# Patient Record
Sex: FEMALE | Race: BLACK OR AFRICAN AMERICAN | NOT HISPANIC OR LATINO | Employment: OTHER | URBAN - METROPOLITAN AREA
[De-identification: names, ages, dates, MRNs, and addresses within clinical notes are randomized per-mention and may not be internally consistent; named-entity substitution may affect disease eponyms.]

---

## 2020-01-13 ENCOUNTER — TRANSFERRED RECORDS (OUTPATIENT)
Dept: HEALTH INFORMATION MANAGEMENT | Facility: CLINIC | Age: 30
End: 2020-01-13

## 2020-01-13 LAB — PAP-ABSTRACT: NORMAL

## 2021-03-05 NOTE — PROGRESS NOTES
Assessment & Plan     Irregular menses  Patient with irregular menses who is trying to get pregnant. Has a history of intrauterine fetal demise at 37 weeks, induced with Cytotec and cook catheter, vaginal delivery last 7/2020. Has a history of being hyperthyroid.   - HCG Qual, Urine ([id number])  - OB/GYN REFERRAL  - TSH with free T4 reflex  - CBC with platelets differential                 Return in about 6 months (around 9/8/2021), or if symptoms worsen or fail to improve.    YOJANA Driscoll CNP  [facility] MANNIE Boo is a 30 year old who presents for the following health issues  accompanied by her spouse:    HPI       Chief Complaint   Patient presents with     Abnormal Bleeding Problem     on/off period for 3 months. Trying to get pregnant.     Menstrual Concern  Onset/Duration: 3 months  Description:   Duration of bleeding episodes: 2 days, last was Feburary 28th and March 1st (a week ago)  Frequency of periods: (1st day of one to 1st day of next):  Very irregular, went 46 days without menses. She thought she might be pregnant because of the long duration between cycles. She is trying to get pregnant. States that she had negative home pregnancy test a week ago. States that her bleeding last week was dark red and malodorous.   Describe bleeding/flow:   Clots:  yes  Number of pads/day: 2        Cramping: none  Accompanying Signs & Symptoms:  Lightheadedness: yes  Temperature intolerance: no  Nosebleeds/Easy bruising: no  Vaginal Discharge: no  History:  Patient's last menstrual period was 02/28/2021 (exact date).  Previous normal periods: YES  Contraceptive use: NO  Sexually active: YES  Any bleeding after intercourse: no  Abnormal PAP Smears: no  Has a history of intrauterine fetal demise at 37 weeks, induced with Cytotec and cook catheter, vaginal delivery last 7/2020. Has a history of being hyperthyroid.   Precipitating or alleviating factors: None  Therapies tried  and outcome: None      Review of Systems   Constitutional, HEENT, cardiovascular, pulmonary, gi and gu systems are negative, except as otherwise noted.      Objective    /58   Pulse 90   Temp 97.7  F (36.5  C) (Oral)   Wt 57.2 kg (126 lb)   SpO2 100%   There is no height or weight on file to calculate BMI.  Physical Exam   GENERAL: healthy, alert and no distress  EYES: Eyes grossly normal to inspection, PERRL and conjunctivae and sclerae normal  RESP: lungs clear to auscultation - no rales, rhonchi or wheezes  CV: regular rate and rhythm, normal S1 S2, no S3 or S4, no murmur, click or rub, no peripheral edema and peripheral pulses strong  ABDOMEN: soft, nontender, no hepatosplenomegaly, no masses and bowel sounds normal  PSYCH: mentation appears normal, affect normal/bright    Results for orders placed or performed in visit on 03/08/21 (from the past 24 hour(s))   HCG Qual, Urine ([id number])   Result Value Ref Range    HCG Qual Urine Negative NEG^Negative

## 2021-03-08 ENCOUNTER — OFFICE VISIT (OUTPATIENT)
Dept: FAMILY MEDICINE | Facility: CLINIC | Age: 31
End: 2021-03-08
Payer: COMMERCIAL

## 2021-03-08 ENCOUNTER — TELEPHONE (OUTPATIENT)
Dept: FAMILY MEDICINE | Facility: CLINIC | Age: 31
End: 2021-03-08

## 2021-03-08 VITALS
WEIGHT: 126 LBS | TEMPERATURE: 97.7 F | HEART RATE: 90 BPM | SYSTOLIC BLOOD PRESSURE: 104 MMHG | DIASTOLIC BLOOD PRESSURE: 58 MMHG | OXYGEN SATURATION: 100 %

## 2021-03-08 DIAGNOSIS — N92.6 IRREGULAR MENSES: Primary | ICD-10-CM

## 2021-03-08 DIAGNOSIS — E05.90 HYPERTHYROIDISM: Primary | ICD-10-CM

## 2021-03-08 LAB
BASOPHILS # BLD AUTO: 0 10E9/L (ref 0–0.2)
BASOPHILS NFR BLD AUTO: 0.2 %
DIFFERENTIAL METHOD BLD: NORMAL
EOSINOPHIL # BLD AUTO: 0.1 10E9/L (ref 0–0.7)
EOSINOPHIL NFR BLD AUTO: 2.2 %
ERYTHROCYTE [DISTWIDTH] IN BLOOD BY AUTOMATED COUNT: 12.6 % (ref 10–15)
HCG UR QL: NEGATIVE
HCT VFR BLD AUTO: 41.1 % (ref 35–47)
HGB BLD-MCNC: 13.1 G/DL (ref 11.7–15.7)
LYMPHOCYTES # BLD AUTO: 1.9 10E9/L (ref 0.8–5.3)
LYMPHOCYTES NFR BLD AUTO: 41.7 %
MCH RBC QN AUTO: 28.5 PG (ref 26.5–33)
MCHC RBC AUTO-ENTMCNC: 31.9 G/DL (ref 31.5–36.5)
MCV RBC AUTO: 90 FL (ref 78–100)
MONOCYTES # BLD AUTO: 0.5 10E9/L (ref 0–1.3)
MONOCYTES NFR BLD AUTO: 10 %
NEUTROPHILS # BLD AUTO: 2.1 10E9/L (ref 1.6–8.3)
NEUTROPHILS NFR BLD AUTO: 45.9 %
PLATELET # BLD AUTO: 206 10E9/L (ref 150–450)
RBC # BLD AUTO: 4.59 10E12/L (ref 3.8–5.2)
T4 FREE SERPL-MCNC: 1.25 NG/DL (ref 0.76–1.46)
TSH SERPL DL<=0.005 MIU/L-ACNC: <0.01 MU/L (ref 0.4–4)
WBC # BLD AUTO: 4.5 10E9/L (ref 4–11)

## 2021-03-08 PROCEDURE — 84439 ASSAY OF FREE THYROXINE: CPT | Performed by: NURSE PRACTITIONER

## 2021-03-08 PROCEDURE — 81025 URINE PREGNANCY TEST: CPT | Performed by: NURSE PRACTITIONER

## 2021-03-08 PROCEDURE — 99203 OFFICE O/P NEW LOW 30 MIN: CPT | Performed by: NURSE PRACTITIONER

## 2021-03-08 PROCEDURE — 36415 COLL VENOUS BLD VENIPUNCTURE: CPT | Performed by: NURSE PRACTITIONER

## 2021-03-08 PROCEDURE — 84443 ASSAY THYROID STIM HORMONE: CPT | Performed by: NURSE PRACTITIONER

## 2021-03-08 PROCEDURE — 85025 COMPLETE CBC W/AUTO DIFF WBC: CPT | Performed by: NURSE PRACTITIONER

## 2021-03-08 NOTE — LETTER
March 8, 2021      Ema Rojas  [address] [id number]        Dear ,    We are writing to inform you of your test results.    See TE 3/8/2021       Resulted Orders   HCG Qual, Urine ([id number])   Result Value Ref Range    HCG Qual Urine Negative NEG^Negative      Comment:      This test is for screening purposes.  Results should be interpreted along with   the clinical picture.  Confirmation testing is available if warranted by   ordering [id number], HCG Quantitative Pregnancy.     TSH with free T4 reflex   Result Value Ref Range    TSH <0.01 (L) 0.40 - 4.00 mU/L   CBC with platelets differential   Result Value Ref Range    WBC 4.5 4.0 - 11.0 10e9/L    RBC Count 4.59 3.8 - 5.2 10e12/L    Hemoglobin 13.1 11.7 - 15.7 g/dL    Hematocrit 41.1 35.0 - 47.0 %    MCV 90 78 - 100 fl    MCH 28.5 26.5 - 33.0 pg    MCHC 31.9 31.5 - 36.5 g/dL    RDW 12.6 10.0 - 15.0 %    Platelet Count 206 150 - 450 10e9/L    Diff Method Automated Method     % Neutrophils 45.9 %    % Lymphocytes 41.7 %    % Monocytes 10.0 %    % Eosinophils 2.2 %    % Basophils 0.2 %    Absolute Neutrophil 2.1 1.6 - 8.3 10e9/L    Absolute Lymphocytes 1.9 0.8 - 5.3 10e9/L    Absolute Monocytes 0.5 0.0 - 1.3 10e9/L    Absolute Eosinophils 0.1 0.0 - 0.7 10e9/L    Absolute Basophils 0.0 0.0 - 0.2 10e9/L   T4 free   Result Value Ref Range    T4 Free 1.25 0.76 - 1.46 ng/dL       If you have any questions or concerns, please call the clinic at the number listed above.       Sincerely,      YOJANA Cabrera CNP/bueno

## 2021-03-08 NOTE — TELEPHONE ENCOUNTER
Patient has history of 37 week IUFD last year and is currently trying to get pregnant. Referral had been done to GYN to discuss irregular periods. Work up today for irregular menses shows patient is hyperthyroid. Thyroid US ordered and referral to endocrinology done. Called patient and discussed results and recommendation. She verbalizes understanding and agreement with plan.    Margy Singleton, APRN, CNP

## 2021-03-08 NOTE — PATIENT INSTRUCTIONS
Patient Education     Dysfunctional Uterine Bleeding    Dysfunctional uterine bleeding is also called abnormal uterine bleeding. It's a condition in which bleeding is abnormal and occurs at unexpected times of the month. This happens because of changes in the hormones that help control a woman s menstrual cycle each month.   The bleeding may be heavier or lighter than normal. If you have heavy bleeding often, this can lead to a problem called anemia. With anemia, your red blood cell count is too low. Red blood cells help carry oxygen throughout your body. Severe anemia may cause you to look pale and feel very weak or tired. You might also become short of breath easily.   To treat dysfunctional uterine bleeding, you may take medicines. If these don t help, or if you have other symptoms or have reached menopause, you may need more testing and other treatments. Discuss all of your options with your provider.   Home care  Medicines  If you re prescribed medicines, take them as directed. Some of the more common medicines you may be prescribed include:     Hormone therapy. These include most methods of hormonal birth control such as pills, shots, or a hormone-releasing IUD.    Nonsteroidal anti-inflammatory drugs (NSAIDs), such as ibuprofen    Tranexamic acid. This helps your blood clot.    Iron supplements, if you have anemia     General care    Get plenty of rest if you tire easily. Don't do strenuous exercise.    To help ease pain or cramping that may occur with bleeding, try using a heating pad on the lower belly or back. A warm bath may also help.    Follow-up care  Follow up with your healthcare provider, or as directed.  When to seek medical advice  Call your healthcare provider right away if:    Bleeding becomes heavy (soaking 1 pad or tampon every hour for 3 hours)    Increased abdominal pain    Irregular bleeding gets worse or does not get better even with treatment    Fever of 100.4 F (38 C) or higher, or as  directed by your provider    Signs of anemia, such as pale skin, extreme fatigue or weakness, or shortness of breath    Dizziness or fainting   Teresa last reviewed this educational content on 7/1/2020 2000-2020 [employer]. All rights reserved. This information is not intended as a substitute for professional medical care. Always follow your healthcare professional's instructions.

## 2021-03-11 ENCOUNTER — ANCILLARY PROCEDURE (OUTPATIENT)
Dept: ULTRASOUND IMAGING | Facility: CLINIC | Age: 31
End: 2021-03-11
Attending: NURSE PRACTITIONER
Payer: COMMERCIAL

## 2021-03-11 DIAGNOSIS — E05.90 HYPERTHYROIDISM: ICD-10-CM

## 2021-03-11 NOTE — LETTER
March 12, 2021      Ema Rojas  [address] [facility] [address]        Dear ,    We are writing to inform you of your test results.    Your results are normal.       Resulted Orders   US Thyroid    Narrative    ULTRASOUND THYROID  3/11/2021 9:41 AM    CLINICAL HISTORY: Hyperthyroidism.    TECHNIQUE: Thyroid ultrasound.     COMPARISON: None available.    FINDINGS:  RIGHT lobe: Measures 4.7 x 1.3 x 1.9 cm. Homogeneous echotexture.  Isthmus: Measures 3 mm in thickness.  LEFT lobe: Measures 4.4 x 1.7 x 1.5 cm. Homogeneous echotexture.    NECK: No cervical lymphadenopathy.    NODULES: No discrete thyroid nodules.      Impression    IMPRESSION:  Normal thyroid ultrasound with no discrete thyroid  nodules.    Nodules are characterized per  ACR Thyroid Imaging, Reporting and Data System (TI-RADS): White Paper  of the ACR TI-RADS Committee  Willie Davidson et al. Journal of the American College of  Radiology 2017. Volume 14 (2017), Issue 5, 587-201.     MONICA LOPES MD       If you have any questions or concerns, please call the clinic at the number listed above.       Sincerely,      Margy Singleton, YOJANA CNP/bueno

## 2021-03-16 ENCOUNTER — TELEPHONE (OUTPATIENT)
Dept: FAMILY MEDICINE | Facility: CLINIC | Age: 31
End: 2021-03-16

## 2021-03-16 NOTE — TELEPHONE ENCOUNTER
Reason for Call:  Request for results:    Name of test or procedure: Labs    Date of test of procedure: 03-    Location of the test or procedure: [facility]    OK to leave the result message on voice mail or with a family member? YES    Phone number Patient can be reached at:  Cell number on file:    Telephone Information:   Mobile [phone]       Additional comments: na    Call taken on 3/16/2021 at 12:52 PM by Tiara Alvarez

## 2021-03-17 NOTE — TELEPHONE ENCOUNTER
Patient requesting results of Thyroid US. Spoke with patient and notified of results as written. She inquired about whether or not she still has hyperthyroidism because her ultrasound came back normal. She was advised that her lab test still did confirm that she has hyperthyroidism despite there being no abnormalities seen on her ultrasound.     She also inquired about what she should do about her hyperthyroidism. Writer asked her if she had scheduled with the endocrinologist yet-- she stated that she was not aware of the Endocrinology Referral that was placed 03/08/2021. Writer provided patient with telephone number to get scheduled with endocrinologist. She verbalized good understanding.     KP Lau RN  [facility] [place]

## 2021-03-19 ENCOUNTER — OFFICE VISIT (OUTPATIENT)
Dept: ENDOCRINOLOGY | Facility: CLINIC | Age: 31
End: 2021-03-19
Payer: COMMERCIAL

## 2021-03-19 VITALS
RESPIRATION RATE: 12 BRPM | HEART RATE: 75 BPM | WEIGHT: 126 LBS | SYSTOLIC BLOOD PRESSURE: 110 MMHG | DIASTOLIC BLOOD PRESSURE: 69 MMHG

## 2021-03-19 DIAGNOSIS — R94.6 ABNORMAL FINDING ON THYROID FUNCTION TEST: Primary | ICD-10-CM

## 2021-03-19 PROCEDURE — 99204 OFFICE O/P NEW MOD 45 MIN: CPT | Performed by: INTERNAL MEDICINE

## 2021-03-19 NOTE — NURSING NOTE
Chief Complaint   Patient presents with     Consult     Thyroid Problem       Initial /69 (BP Location: Right arm, Patient Position: Sitting, Cuff Size: Adult Regular)   Pulse 75   Resp 12   Wt 57.2 kg (126 lb)   LMP 02/28/2021 (Exact Date)  There is no height or weight on file to calculate BMI.  BP completed using cuff size: regular  Medications and allergies reviewed.      Belgica SHEPPARD MA

## 2021-03-19 NOTE — PROGRESS NOTES
CC: Abnormal thyroid function tests     HPI:   Patient presents for evaluation of abnormal thyroid function tests.     This was checked as part of a work up for irregular menses.   Irregular menses began in 12/2020.   No prior menstrual issues.     She was told that she had an abnormal thyroid labs in 7/2020 when she delivered a stillborn baby.   Remarks she was told that labs were normal after.     No palpitations or SOB.   She has gained 8 pounds since 7/2020. No change in appetite.     No recent illness.   No supplement use.     She has also developed heat intolerance and trouble falling asleep.     ROS: 10 point ROS neg other than the symptoms noted above in the HPI.    PMH:   There is no problem list on file for this patient.    Meds:  No current outpatient medications on file.     No current facility-administered medications for this visit.       FHX:   No thyroid disease.     SHX:  Works in medical assembly  Non-smoker.    Exam:   Vital signs:      BP: 110/69 Pulse: 75   Resp: 12         Weight: 57.2 kg (126 lb)  There is no height or weight on file to calculate BMI.  Gen: In NAD.   HEENT: no proptosis or lid lag, EOMI, thyroid enlarged w/o clear nodule.   Card: S1 S2, tachycardia, no m/r/g. no LE edema.   Pulm: CTA b/l.   GI: NT ND +BS.   MSK: no gross deformities.   Derm: no rashes or lesions.   Neuro: no tremor, +2 DTR's.     A/P:   Abnormal thyroid function tests - DDx includes thyroiditis, Graves', and toxic nodule. Possible treatments of I 131, methimazole/PTU, and surgery discussed. She is less than one year from delivering a baby which was sadly stillborn. Low TSH, 0.01 in 7/2020 but other thyroid labs including TRAB were normal. As she is looking to get pregnant again in the near future, I would use PTU. Explained she would need to wait 6 months post I 131 until trying to conceive again.   -Schedule thyroid uptake and scan.   -We will plan on using PTU if needed. For more information go to  [email or web address]  Call [place] radiology scheduling for your procedure:    For scheduling at [facility] ([facility], [facility]), call [phone] or [phone]            Ammon Rosado M.D

## 2021-03-19 NOTE — LETTER
3/19/2021         RE: Ema Rojas  [address] [address]        Dear Colleague,    [facility] you [facility] referring your patient, [facility]ma Rojas[facility] Please see a copy of my visit note below.    CC: Abnormal thyroid function tests     HPI:   Patient presents for evaluation of abnormal thyroid function tests.     This was checked as part of a work up for irregular menses.   Irregular menses began in 12/2020.   No prior menstrual issues.     She was told that she had an abnormal thyroid labs in 7/2020 when she delivered a stillborn baby.   Remarks she was told that labs were normal after.     No palpitations or SOB.   She has gained 8 pounds since 7/2020. No change in appetite.     No recent illness.   No supplement use.     She has also developed heat intolerance and trouble falling asleep.     ROS: 10 point ROS neg other than the symptoms noted above in the HPI.    PMH:   There is no problem list on file for this patient.    Meds:  No current outpatient medications on file.     No current facility-administered medications for this visit.       FHX:   No thyroid disease.     SHX:  Works in medical assembly  Non-smoker.    Exam:   Vital signs:      BP: 110/69 Pulse: 75   Resp: 12         Weight: 57.2 kg (126 lb)  There is no height or weight on file to calculate BMI.  Gen: In NAD.   HEENT: no proptosis or lid lag, EOMI, thyroid enlarged w/o clear nodule.   Card: S1 S2, tachycardia, no m/r/g. no LE edema.   Pulm: CTA b/l.   GI: NT ND +BS.   MSK: no gross deformities.   Derm: no rashes or lesions.   Neuro: no tremor, +2 DTR's.     A/P:   Abnormal thyroid function tests - DDx includes thyroiditis, Graves', and toxic nodule. Possible treatments of I 131, methimazole/PTU, and surgery discussed. She is less than one year from delivering a baby which was sadly stillborn. Low TSH, 0.01 in 7/2020 but other thyroid labs including TRAB were normal. As she is looking to get  pregnant again in the near future, I would use PTU. Explained she would need to wait 6 months post I 131 until trying to conceive again.   -Schedule thyroid uptake and scan.   -We will plan on using PTU if needed. For more information go to [email or web address]  Call [place] radiology scheduling for your procedure:    For scheduling at [facility] ([facility], [facility]), call [phone] or [phone]            Ammon Rosado M.D          Again, thank you for allowing me to participate in the care of your patient.        Sincerely,        Ammon Rosado MD

## 2021-03-19 NOTE — PATIENT INSTRUCTIONS
-Schedule thyroid uptake and scan.   -We will plan on using PTU if needed. For more information go to [email or web address]    Call [place] radiology scheduling for your procedure:    For scheduling at [facility] ([facility], [facility] and [facility]), call [phone] or [phone]      For scheduling in the [place] ([place], [facility], and [place]) call [phone] or [phone]

## 2021-04-08 ENCOUNTER — PRENATAL OFFICE VISIT (OUTPATIENT)
Dept: OBGYN | Facility: CLINIC | Age: 31
End: 2021-04-08
Payer: COMMERCIAL

## 2021-04-08 VITALS — BODY MASS INDEX: 26.63 KG/M2 | WEIGHT: 123.46 LBS | HEIGHT: 57 IN

## 2021-04-08 DIAGNOSIS — Z34.80 PRENATAL CARE, SUBSEQUENT PREGNANCY, UNSPECIFIED TRIMESTER: Primary | ICD-10-CM

## 2021-04-08 PROCEDURE — 99207 PR NO CHARGE NURSE ONLY: CPT

## 2021-04-08 RX ORDER — PNV NO.95/FERROUS FUM/FOLIC AC 28MG-0.8MG
TABLET ORAL
COMMUNITY
End: 2021-07-09

## 2021-04-08 SDOH — HEALTH STABILITY: MENTAL HEALTH: HOW OFTEN DO YOU HAVE A DRINK CONTAINING ALCOHOL?: NEVER

## 2021-04-08 ASSESSMENT — MIFFLIN-ST. JEOR: SCORE: 1163.88

## 2021-05-01 ENCOUNTER — NURSE TRIAGE (OUTPATIENT)
Dept: NURSING | Facility: CLINIC | Age: 31
End: 2021-05-01

## 2021-05-01 ENCOUNTER — HEALTH MAINTENANCE LETTER (OUTPATIENT)
Age: 31
End: 2021-05-01

## 2021-05-01 NOTE — TELEPHONE ENCOUNTER
Spouse and pt on the line, pt 8w6d pregnant, reports vomiting 5 times in the past 24 hours.  Pt able to keep liquids down but vomits when she eats solids.  She is urinating.  Pt taking her prenatal at [facility].      OB appt 05/12/21    Disposition:  Home care with education.  Advised pushing fluids and starting Vitamin B 6 per protocol.  Call back with any new/worsening symptom.    He verbalized understanding and had no further questions.     COVID 19 Nurse Triage Plan/Patient Instructions    Please be aware that novel coronavirus (COVID-19) may be circulating in the community. If you develop symptoms such as fever, cough, or SOB or if you have concerns about the presence of another infection including coronavirus (COVID-19), please contact your health care provider or visit [email or web address].     Disposition/Instructions    Home care recommended. Follow home care protocol based instructions.    Thank you for taking steps to prevent the spread of this virus.  o Limit your contact with others.  o Wear a simple mask to cover your cough.  o Wash your hands well and often.    Resources    [facility]: About COVID-19: [email or web address]    CDC: What to Do If You're Sick: www.cdc.gov/coronavirus/2019-ncov/about/steps-when-sick.html    CDC: Ending Home Isolation: www.cdc.gov/coronavirus/2019-ncov/hcp/disposition-in-home-patients.html     CDC: Caring for Someone: www.cdc.gov/coronavirus/2019-ncov/if-you-are-sick/care-for-someone.html     [facility]: Interim Guidance for Hospital Discharge to Home: www.health.[facility].[place].us/diseases/coronavirus/hcp/hospdischarge.pdf    [facility] clinical trials (COVID-19 research studies): clinicalaffairs.[email or web address].edu/um-clinical-trials     Below are the COVID-19 hotlines at the [place] Department [facility] ([facility]). Interpreters are available.   o For health questions: Call 493-056-9795 or 1-946.839.8167 (7 a.m. to 7 p.m.)  o For questions about schools and  "childcare: Call 648-813-3140 or 1-242.587.9320 (7 a.m. to 7 p.m.)                       Additional Information    Negative: Sounds like a life-threatening emergency to the triager    Negative: [1] Vomiting AND [2] contains red blood or black (\"coffee ground\") material  (Exception: few red streaks in vomit that only happened once)    Negative: [1] Insulin-dependent diabetes (Type I) AND [2] glucose > 400 mg/dl (22 mmol/l)    Negative: Recent head injury (within last 3 days)    Negative: Recent abdominal injury (within last 3 days)    Negative: Severe pain in one eye    Negative: [1] SEVERE vomiting (e.g., 8 or more times / day) AND [2] present > 8 hours    Negative: [1] Drinking very little AND [2] dehydration suspected (e.g., no urine > 12 hours, very dry mouth, very lightheaded)    Negative: Patient sounds very sick or weak to the triager    Negative: [1] Unable to keep ANY liquids down (without vomiting) AND [2] present > 24 hours    Negative: Weight loss > 5 pounds (2.5 kg) in last 2 weeks    Negative: Vomiting an essential/prescribed medication  (Exception: prenatal vitamins)    Negative: Recently started on a new medication    Negative: [1] MODERATE vomiting (e.g., 2-7 times / day) AND [2] present > 3 days    Negative: Pain or burning with passing urine (urination)    Negative: Alcohol or drug abuse, known or suspected    Negative: High-risk adult (e.g., diabetes, AIDS/HIV)    Negative: [1] MILD vomiting (e.g., 1-2 times / day) AND [2] present > 1 week AND [3] no improvement after using Morning Sickness Care Advice    MILD-MODERATE vomiting  (e.g., 1-7 times / day)or nausea    Protocols used: PREGNANCY - MORNING SICKNESS (NAUSEA AND VOMITING OF PREGNANCY)-A-AH      "

## 2021-05-12 ENCOUNTER — PRENATAL OFFICE VISIT (OUTPATIENT)
Dept: OBGYN | Facility: CLINIC | Age: 31
End: 2021-05-12
Payer: MEDICAID

## 2021-05-12 ENCOUNTER — MEDICAL CORRESPONDENCE (OUTPATIENT)
Dept: HEALTH INFORMATION MANAGEMENT | Facility: CLINIC | Age: 31
End: 2021-05-12

## 2021-05-12 VITALS
DIASTOLIC BLOOD PRESSURE: 86 MMHG | SYSTOLIC BLOOD PRESSURE: 135 MMHG | OXYGEN SATURATION: 98 % | WEIGHT: 118.4 LBS | HEART RATE: 112 BPM | BODY MASS INDEX: 25.62 KG/M2

## 2021-05-12 DIAGNOSIS — Z34.80 PRENATAL CARE, SUBSEQUENT PREGNANCY, UNSPECIFIED TRIMESTER: ICD-10-CM

## 2021-05-12 DIAGNOSIS — O09.291 PRIOR PREGNANCY WITH FETAL DEMISE, ANTEPARTUM, FIRST TRIMESTER: Primary | ICD-10-CM

## 2021-05-12 DIAGNOSIS — O21.9 NAUSEA AND VOMITING DURING PREGNANCY: ICD-10-CM

## 2021-05-12 LAB
ABO + RH BLD: NORMAL
ABO + RH BLD: NORMAL
BASOPHILS # BLD AUTO: 0 10E9/L (ref 0–0.2)
BASOPHILS NFR BLD AUTO: 0.2 %
BLD GP AB SCN SERPL QL: NORMAL
BLOOD BANK CMNT PATIENT-IMP: NORMAL
DIFFERENTIAL METHOD BLD: NORMAL
EOSINOPHIL # BLD AUTO: 0 10E9/L (ref 0–0.7)
EOSINOPHIL NFR BLD AUTO: 0.5 %
ERYTHROCYTE [DISTWIDTH] IN BLOOD BY AUTOMATED COUNT: 12.7 % (ref 10–15)
HCT VFR BLD AUTO: 39.1 % (ref 35–47)
HGB BLD-MCNC: 13.3 G/DL (ref 11.7–15.7)
LYMPHOCYTES # BLD AUTO: 1.4 10E9/L (ref 0.8–5.3)
LYMPHOCYTES NFR BLD AUTO: 24.6 %
MCH RBC QN AUTO: 28.8 PG (ref 26.5–33)
MCHC RBC AUTO-ENTMCNC: 34 G/DL (ref 31.5–36.5)
MCV RBC AUTO: 85 FL (ref 78–100)
MONOCYTES # BLD AUTO: 0.5 10E9/L (ref 0–1.3)
MONOCYTES NFR BLD AUTO: 9.1 %
NEUTROPHILS # BLD AUTO: 3.8 10E9/L (ref 1.6–8.3)
NEUTROPHILS NFR BLD AUTO: 65.6 %
PLATELET # BLD AUTO: 225 10E9/L (ref 150–450)
RBC # BLD AUTO: 4.62 10E12/L (ref 3.8–5.2)
SPECIMEN EXP DATE BLD: NORMAL
T PALLIDUM AB SER QL: NONREACTIVE
WBC # BLD AUTO: 5.7 10E9/L (ref 4–11)

## 2021-05-12 PROCEDURE — 99203 OFFICE O/P NEW LOW 30 MIN: CPT | Performed by: OBSTETRICS & GYNECOLOGY

## 2021-05-12 PROCEDURE — 86900 BLOOD TYPING SEROLOGIC ABO: CPT | Performed by: OBSTETRICS & GYNECOLOGY

## 2021-05-12 PROCEDURE — 87086 URINE CULTURE/COLONY COUNT: CPT | Performed by: OBSTETRICS & GYNECOLOGY

## 2021-05-12 PROCEDURE — 85025 COMPLETE CBC W/AUTO DIFF WBC: CPT | Performed by: OBSTETRICS & GYNECOLOGY

## 2021-05-12 PROCEDURE — 87389 HIV-1 AG W/HIV-1&-2 AB AG IA: CPT | Performed by: OBSTETRICS & GYNECOLOGY

## 2021-05-12 PROCEDURE — 36415 COLL VENOUS BLD VENIPUNCTURE: CPT | Performed by: OBSTETRICS & GYNECOLOGY

## 2021-05-12 PROCEDURE — 86762 RUBELLA ANTIBODY: CPT | Performed by: OBSTETRICS & GYNECOLOGY

## 2021-05-12 PROCEDURE — 86901 BLOOD TYPING SEROLOGIC RH(D): CPT | Performed by: OBSTETRICS & GYNECOLOGY

## 2021-05-12 PROCEDURE — 87340 HEPATITIS B SURFACE AG IA: CPT | Performed by: OBSTETRICS & GYNECOLOGY

## 2021-05-12 PROCEDURE — 99000 SPECIMEN HANDLING OFFICE-LAB: CPT | Performed by: OBSTETRICS & GYNECOLOGY

## 2021-05-12 PROCEDURE — 86780 TREPONEMA PALLIDUM: CPT | Performed by: OBSTETRICS & GYNECOLOGY

## 2021-05-12 PROCEDURE — 86850 RBC ANTIBODY SCREEN: CPT | Performed by: OBSTETRICS & GYNECOLOGY

## 2021-05-12 RX ORDER — ONDANSETRON 8 MG/1
8 TABLET, FILM COATED ORAL EVERY 8 HOURS PRN
Qty: 30 TABLET | Refills: 3 | Status: SHIPPED | OUTPATIENT
Start: 2021-05-12 | End: 2022-01-25

## 2021-05-12 NOTE — LETTER
May 12, 2021      Ema Rojas  [address] [address]        To Whom it may concern    Ema has been seen by me.  She is pregnant with her LMP of 02/28/2021 and her estimated due date is 12/05/2021.       Sincerely,          Adi Ku MD

## 2021-05-12 NOTE — PROGRESS NOTES
INITIAL OB ASSESSMENT............................................Date: 2021                            ---------------------    Name: Ema Rojas.......................................................................Plan Number: [id number]    Age: 30 year old   : 1990  Phone: [phone] (home)   Address: [address] [place] [id number]    Marital Status:    Race/Ethnicity: , Georgian   Occupation:     Partner's Name:  Juana Chester, Partner's Occupation:  [employer]     OB Physician: Adi Ku MD       LMP:  Patient's LMP from OB Dating Form:  Patient's last menstrual period was 2021 (exact date).  Regular menses? yes  Menses every month.  Length of menses: 3 days    Obstetrical History  ===================  OB History    Para Term  AB Living   2 1 1 0 0 0   SAB TAB Ectopic Multiple Live Births   0 0 0 0 0      # Outcome Date GA Lbr Polo/2nd Weight Sex Delivery Anes PTL Lv   2 Current            1 Term 20 38w0d   F    FD       Past Medical History:  Past Medical History:   Diagnosis Date     Hyperthyroidism     she has seen endocrinology 2021     Prior pregnancy with fetal demise 2020       Past Surgical History:  Past Surgical History:   Procedure Laterality Date     NO HISTORY OF SURGERY         Current Outpatient Medications   Medication Sig Dispense Refill     ondansetron (ZOFRAN) 8 MG tablet Take 1 tablet (8 mg) by mouth every 8 hours as needed for nausea 30 tablet 3     Prenatal Vit-Fe Fumarate-FA (PRENATAL VITAMIN) 27-0.8 MG TABS          No Known Allergies    Social History     Socioeconomic History     Marital status:      Spouse name: Not on file     Number of children: Not on file     Years of education: Not on file     Highest education level: Not on file   Occupational History     Not on file   Social Needs     Financial resource strain: Not on file     Food insecurity      Worry: Not on file     Inability: Not on file     Transportation needs     Medical: Not on file     Non-medical: Not on file   Tobacco Use     Smoking status: Never Smoker     Smokeless tobacco: Never Used   Substance and Sexual Activity     Alcohol use: Never     Frequency: Never     Drug use: Never     Sexual activity: Yes     Partners: Male     Birth control/protection: None   Lifestyle     Physical activity     Days per week: Not on file     Minutes per session: Not on file     Stress: Not on file   Relationships     Social connections     Talks on phone: Not on file     Gets together: Not on file     Attends Rastafari service: Not on file     Active member of club or organization: Not on file     Attends meetings of clubs or organizations: Not on file     Relationship status: Not on file     Intimate partner violence     Fear of current or ex partner: Not on file     Emotionally abused: Not on file     Physically abused: Not on file     Forced sexual activity: Not on file   Other Topics Concern     Not on file   Social History Narrative     Not on file       No substance abuse, environmental exposures, mental health risks and No financial concerns,pets. Partner support.       Family History   Problem Relation Age of Onset     No Known Problems Mother      No Known Problems Father      No Known Problems Maternal Grandmother      No Known Problems Maternal Grandfather      No Known Problems Paternal Grandmother      No Known Problems Paternal Grandfather      No Known Problems Brother      No Known Problems Sister      No Known Problems Brother      No Known Problems Sister      No Known Problems Sister      No Known Problems Sister        Past Medical History of Father of Baby:   No significant medical history     No known genetic disease in patient's 1st or 2nd degree relatives  No known genetic diseases in the FOB's 1st or 2nd degree relatives      REVIEW OF SYMPTOMS:   History Since Last Menstrual  Period:    nausea, vomiting, fatigue and breast tenderness       PHYSICAL EXAM:  /86 (BP Location: Right arm, Cuff Size: Adult Regular)   Pulse 112   Wt 53.7 kg (118 lb 6.4 oz)   LMP 2021 (Exact Date)   SpO2 98%   Breastfeeding No   BMI 25.62 kg/m    General:  WNWD female, NAD  Oriented:  X 3  Alert  PSYCH:  mentation appears normal., affect and mood normal  HEENT:  NC/AT, EOMI  NECK:  Neck supple. No adenopathy. Thyroid symmetric, normal size,, Carotids without bruits.  HEART:  RRR  LUNGS:  Clear to auscultation.  Good respiratory effort  BREASTS: Declined   ABDOMEN: Benign, Soft, flat, non-tender, No masses, organomegaly and Bowel sounds normoactive FHTs heard  VULVA: no masses or lesions seen  BUS:  Bartholin's, Urethra, [place]'s normal  VAGINA:  No masses or lesions seen.   CERVIX:  Parous, closed, mobile, no discharge  UTERUS:  Normal shape, position and consistency and Nontender; 10-12 week size  ADNEXA:  No masses palpated, non-tender  EXTREMITIES:No cyanosis, clubbing, warm and well perfused and No edema   GAIT: normal including tandem walk, heel and toe walk.        Assessment:   IUP at 10.3 weeks  Prior pregnancy with fetal demise at term  Hyperthyroidism, with normal T4 (she has seen Endocrine)  Nausea in pregnancy       Plan:  Options for  testing for chromosomal and birth defects were discussed with the patient.  Diagnostic tests include CVS and Amniocentesis.  We discussed that these tests are definitive but invasive and do carry a risk of fetal loss.    Screening tests include nuchal translucency/blood marker testing in the first trimester and quad screening in the second trimester.  We discussed that these are screening tests and not diagnostic tests and that false positives and negatives are a distinct possibility.  The patient will discuss with her  and decide.  M consultation regarding her prior fetal demise at term is recommended.   We discussed physician  coverage, tertiary support, diet, exercise, weight gain, schedule of visits, routine and indicated ultrasounds, and childbirth education.   Zofran prescription is sent to pharmacy for the nausea.    Labs done today  RTC 4 weeks

## 2021-05-13 LAB
BACTERIA SPEC CULT: NO GROWTH
HBV SURFACE AG SERPL QL IA: NONREACTIVE
HIV 1+2 AB+HIV1 P24 AG SERPL QL IA: NONREACTIVE
Lab: NORMAL
RUBV IGG SERPL IA-ACNC: 20 IU/ML
SPECIMEN SOURCE: NORMAL

## 2021-05-17 ENCOUNTER — TELEPHONE (OUTPATIENT)
Dept: OBGYN | Facility: CLINIC | Age: 31
End: 2021-05-17

## 2021-05-17 ENCOUNTER — MEDICAL CORRESPONDENCE (OUTPATIENT)
Dept: HEALTH INFORMATION MANAGEMENT | Facility: CLINIC | Age: 31
End: 2021-05-17

## 2021-05-17 NOTE — TELEPHONE ENCOUNTER
NANCI called wondering if you would like to do genetic testing at 12 weeks and if you are wanting a consult for Fetal demise added.    Please advise and we can call M back with Recommendations as the note from 05/12/2021 doesn't look complete.

## 2021-05-19 NOTE — TELEPHONE ENCOUNTER
She was referred to [facility] for the prior fetal demise and for genetic counseling (and testing if patient desires).   Adi Ku MD

## 2021-05-19 NOTE — TELEPHONE ENCOUNTER
I called and left message with [facility] to return call regarding Dr. Ku's note in [place].  SAKSHI Perez 5/19/2021

## 2021-05-20 ENCOUNTER — TRANSCRIBE ORDERS (OUTPATIENT)
Dept: MATERNAL FETAL MEDICINE | Facility: CLINIC | Age: 31
End: 2021-05-20

## 2021-05-20 ENCOUNTER — TRANSFERRED RECORDS (OUTPATIENT)
Dept: HEALTH INFORMATION MANAGEMENT | Facility: CLINIC | Age: 31
End: 2021-05-20

## 2021-05-20 ENCOUNTER — MEDICAL CORRESPONDENCE (OUTPATIENT)
Dept: HEALTH INFORMATION MANAGEMENT | Facility: CLINIC | Age: 31
End: 2021-05-20

## 2021-05-20 DIAGNOSIS — O26.90 PREGNANCY RELATED CONDITION, ANTEPARTUM: Primary | ICD-10-CM

## 2021-05-20 NOTE — TELEPHONE ENCOUNTER
Called [facility] and informed of Dr. Ku's Recommendations.    Palak Moya, [facility]  May 20, 2021

## 2021-05-21 ENCOUNTER — TELEPHONE (OUTPATIENT)
Dept: MATERNAL FETAL MEDICINE | Facility: CLINIC | Age: 31
End: 2021-05-21

## 2021-05-21 DIAGNOSIS — O09.291 PRIOR PREGNANCY WITH FETAL DEMISE AND CURRENT PREGNANCY IN FIRST TRIMESTER: Primary | ICD-10-CM

## 2021-05-21 NOTE — TELEPHONE ENCOUNTER
MFM received referral from Dr. Ku. Called patient to schedule FTS and MFM consultation. Upon reaching patient to schedule, patient stated that she does not wish to schedule these appointments with MFM. Referring clinic notified.       Tammy Alvarez

## 2021-06-08 ENCOUNTER — PRENATAL OFFICE VISIT (OUTPATIENT)
Dept: OBGYN | Facility: CLINIC | Age: 31
End: 2021-06-08
Payer: MEDICAID

## 2021-06-08 VITALS
OXYGEN SATURATION: 100 % | SYSTOLIC BLOOD PRESSURE: 132 MMHG | BODY MASS INDEX: 25.32 KG/M2 | DIASTOLIC BLOOD PRESSURE: 77 MMHG | WEIGHT: 117 LBS | HEART RATE: 110 BPM

## 2021-06-08 DIAGNOSIS — O09.292 PRIOR PREGNANCY WITH FETAL DEMISE AND CURRENT PREGNANCY IN SECOND TRIMESTER: Primary | ICD-10-CM

## 2021-06-08 PROCEDURE — 99212 OFFICE O/P EST SF 10 MIN: CPT | Performed by: OBSTETRICS & GYNECOLOGY

## 2021-06-08 NOTE — PROGRESS NOTES
14w2d   Eating well.  Nausea has improved.   She has not made appointment with [facility] as she does not currently have insurance but she will make the appointment once she is approved for insurance.    She has seen Endocrine regarding the thyroid.  Her Free T4 is in normal range, with the low TSH level.  She should follow up with endocrine, but she will likely hold on that for the same reason as above.   RTC 4 weeks  Adi Ku MD

## 2021-07-09 ENCOUNTER — TRANSCRIBE ORDERS (OUTPATIENT)
Dept: MATERNAL FETAL MEDICINE | Facility: CLINIC | Age: 31
End: 2021-07-09

## 2021-07-09 ENCOUNTER — PRENATAL OFFICE VISIT (OUTPATIENT)
Dept: OBGYN | Facility: CLINIC | Age: 31
End: 2021-07-09
Payer: COMMERCIAL

## 2021-07-09 VITALS
BODY MASS INDEX: 26.05 KG/M2 | WEIGHT: 120.4 LBS | OXYGEN SATURATION: 100 % | SYSTOLIC BLOOD PRESSURE: 120 MMHG | DIASTOLIC BLOOD PRESSURE: 76 MMHG | HEART RATE: 88 BPM

## 2021-07-09 DIAGNOSIS — O09.292 PRIOR PREGNANCY WITH FETAL DEMISE AND CURRENT PREGNANCY IN SECOND TRIMESTER: Primary | ICD-10-CM

## 2021-07-09 DIAGNOSIS — O26.90 PREGNANCY RELATED CONDITION, ANTEPARTUM: Primary | ICD-10-CM

## 2021-07-09 PROCEDURE — 99207 PR PRENATAL VISIT: CPT | Performed by: OBSTETRICS & GYNECOLOGY

## 2021-07-09 RX ORDER — PNV NO.95/FERROUS FUM/FOLIC AC 28MG-0.8MG
1 TABLET ORAL DAILY
Qty: 90 TABLET | Refills: 2 | Status: SHIPPED | OUTPATIENT
Start: 2021-07-09 | End: 2022-01-25

## 2021-07-09 NOTE — TELEPHONE ENCOUNTER
Requested Prescriptions   Pending Prescriptions Disp Refills     Prenatal Vit-Fe Fumarate-FA (PRENATAL VITAMIN) 27-0.8 MG TABS         There is no refill protocol information for this order        Pt last seen today, currently 18w5d    Last prescribed, nothing on file.    Routing refill request to provider for review/approval because:  Drug not on the [facility] refill protocol     Shantal Crockett RN on 7/9/2021 at 10:29 AM

## 2021-07-09 NOTE — PROGRESS NOTES
18w5d.   Doing well without issues/concerns.    She has a little FM.    She is interested in the level 2 ultrasound with MFM.  She has her insurance coverage so she is now willing to go to see them.   RTC 2 weeks  Adi Ku MD

## 2021-07-09 NOTE — TELEPHONE ENCOUNTER
[facility] Call Center    Phone Message    May a detailed message be left on voicemail: yes     Reason for Call: Medication Refill Request    Has the patient contacted the pharmacy for the refill? Yes   Name of medication being requested: Prenatal Vit-Fe Fumarate-FA (PRENATAL VITAMIN) 27-0.8 MG TABS  Provider who prescribed the medication: [facility]   [facility]acy: [facility] [place]  Date medication is needed: ASAP    Action Taken: Message routed to:  [facility] [id number]    Travel Screening: Not Applicable

## 2021-07-22 ENCOUNTER — PRE VISIT (OUTPATIENT)
Dept: MATERNAL FETAL MEDICINE | Facility: CLINIC | Age: 31
End: 2021-07-22

## 2021-07-22 PROBLEM — D50.8 IRON DEFICIENCY ANEMIA SECONDARY TO INADEQUATE DIETARY IRON INTAKE: Status: ACTIVE | Noted: 2020-06-15

## 2021-07-22 PROBLEM — O36.4XX0 IUFD AT 20 WEEKS OR MORE OF GESTATION: Status: ACTIVE | Noted: 2020-07-17

## 2021-07-22 PROBLEM — O09.899 SUSCEPTIBLE TO VARICELLA (NON-IMMUNE), CURRENTLY PREGNANT: Status: ACTIVE | Noted: 2020-01-13

## 2021-07-22 PROBLEM — Z28.39 SUSCEPTIBLE TO VARICELLA (NON-IMMUNE), CURRENTLY PREGNANT: Status: ACTIVE | Noted: 2020-01-13

## 2021-07-23 ENCOUNTER — OFFICE VISIT (OUTPATIENT)
Dept: MATERNAL FETAL MEDICINE | Facility: CLINIC | Age: 31
End: 2021-07-23
Attending: OBSTETRICS & GYNECOLOGY
Payer: COMMERCIAL

## 2021-07-23 ENCOUNTER — HOSPITAL ENCOUNTER (OUTPATIENT)
Dept: ULTRASOUND IMAGING | Facility: CLINIC | Age: 31
End: 2021-07-23
Attending: OBSTETRICS & GYNECOLOGY
Payer: COMMERCIAL

## 2021-07-23 DIAGNOSIS — O26.90 PREGNANCY RELATED CONDITION, ANTEPARTUM: ICD-10-CM

## 2021-07-23 DIAGNOSIS — O09.292 HISTORY OF STILLBIRTH IN CURRENTLY PREGNANT PATIENT, SECOND TRIMESTER: Primary | ICD-10-CM

## 2021-07-23 PROCEDURE — 76811 OB US DETAILED SNGL FETUS: CPT

## 2021-07-23 PROCEDURE — G0463 HOSPITAL OUTPT CLINIC VISIT: HCPCS

## 2021-07-23 PROCEDURE — 99205 OFFICE O/P NEW HI 60 MIN: CPT | Mod: 25 | Performed by: OBSTETRICS & GYNECOLOGY

## 2021-07-23 PROCEDURE — 76811 OB US DETAILED SNGL FETUS: CPT | Mod: 26 | Performed by: OBSTETRICS & GYNECOLOGY

## 2021-07-23 NOTE — NURSING NOTE
Ema seen in clinic today for L2/MFM Consult at 20w5d gestation for pregnancy complicated by hx fetal demise at 38 wks, hyperthyroidism (see report/notes).  Dr. Moya met with pt and discussed POC (see note). Plan for recommendation to be sent to primary provider. No future visits scheduled at this time. Pt discharged stable and ambulatory.

## 2021-07-23 NOTE — PROGRESS NOTES
Adi Ku M.D.     Thank you for requesting a consultation on your patient, Ms. Ema Rojas, for a history of a 36-week 6 day IUFD diagnosed on 2020.  Labs performed at time of diagnosis included negative parvovirus and syphilis screening. Normal thyroid function and serum glucose was 102 mg/dl with HbA1c was 5.0%. Placental pathology reported presence of an umbilical cord hematoma which was attributed to be the cause of the fetal death. Potential associations include mechanical trauma or rupture of a varix with reported mortality of 40-50%. 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 Her UDs was negative. She is also being treated with levothyroxine for hypothyroidism. Her free T4 was within normal range (1.25) but her TSH is suppressed to < 0.01 in 2021. She is followed by endocrinology but has not seen them recently.    Ms. Rojas is a 31-year-old  who has immigrated from [place]. She was initially seen at [facility] and then transferred prenatal care to [facility]. Her prenatal care had been uncomplicated with regular visits with her providers. She had no reported complications with normal BP and 14-pound weight gain. Depression screening during the pregnancy was negative. Her VELIA based on first trimester ultrasound was 2020. She was evaluated by her provider for decreased FM on 2020 and it was determined by ultrasound that an IUFD had occurred unknown cause.  The EFW prior to birth was 3445 gram (88th percentile), which was at the 88th percentile for gestational age, and polyhydramnios was reported by MVP 8.4 cm. She had a VD after IOL with misoprostol on 2020, 3 days after being diagnosed with IUFD. The delivery was uncomplicated and there were no lacerations or postpartum hemorrhage. Thyroid function at the time of delivery was normal.  She declined infant autopsy and genetic microarray studies. She is currently at 20 weeks and 5 days and is here for recommendations for surveillance and delivery.      Prenatal labs report normal results with blood type A Rh positive. Screening for syphilis, hepatitis B, HIV and rubella are all negative. CBC normal with platelets of 225 k/MM3. She takes PNV and Vitamin D supplements. On her initial prenatal visit at 7 weeks, her BP was documented at 110/60 mmHg. With urine negative glucose, nitrites and ketones, but with trace protein.      IMPRESSION:     History of IUFD at 38weeks associated with large fetus (EFW 88th%) with AC consistent with 40 weeks and 3 days at time of diagnosis and polyhydramnios. This is despite having had a HbA1c within the normal range at time of delivery it is not a reliable indicator of glucose intolerance in the third trimester of pregnancy, especially in the context of the findings reported at ultrasound. The diagnosis of umbilical cord hematoma as a cause for the demise was also raised but the literature does not support the diagnosis as consistently being associated with stillbirth even when present.   We reviewed general causes for stillbirth which include maternal, obstetrical and fetal disease. Maternal conditions most commonly reported to be associated with stillbirth include hypertensive disorders, diabetes in pregnancy, and although there was a rise in BP around the time of delivery, there is no evidence of persistent disease and both have been excluded for this patient. We discussed possible fetal conditions such as structural or chromosomal abnormalities and obstetrical conditions such as placental insufficiency, abruption, vasa previa etc. The only finding that has been clearly associated with stillbirth for Ms. Rojas is concern for glucose intolerance associated with large AC and suspected polyhydramnios consistent with some degree of glucose intolerance.     We reviewed  findings of umbilical cord hematoma and the limited information available does not consistently associate the diagnosis with stillbirth.  Furthermore, it is not clear if the is a pre or postmortem event. This finding does not increase baseline risk for recurrent stillbirth in a future pregnancy.     I have discussed association of FGR with risk of stillbirth in a future pregnancy, and the importance of surveillance for FGR with plan for delivery based on fetal surveillance for FGR, BPP and Doppler assessments if FGR recurs.     Ms. Rojas is obviously very concerned about the risk for recurrent stillbirth. I have spent over 60 minutes reviewing findings. I have also emphasized that current standard of care is delivery by 39 weeks with early delivery dictated by findings on  surveillance. I have emphasized the importance of surveillance to detect conditions that could lead to stillbirth rather than focusing on a set timing of delivery. This is important since important developments could be missed if the focus is on timing of delivery and not on surveillance and detection of conditions that could lead to recurrence at an earlier gestational age.     RECOMMENDATIONS:     We reviewed all the recommendations previously discussed:   1) We discussed prenatal genetic screening and testing.  We would recommend discussing option for cell free DNA testing in maternal serum.  2) Comprehensive ultrasound at 18-20 weeks was already done today and was normal with normal growth.    3) Serial ultrasounds for fetal growth every 4 weeks beginning at the time of the comprehensive ultrasound until delivery. Please assess for EFW > 90th percentile or AC accelerated growth (>90th percentile) and or for presence of polyhydramnios.   4)  surveillance with weekly BPP beginning at 32 weeks' gestation until delivery.  5) Deliver at 39 weeks or as indicated by  surveillance large AC, polyhydramnios, non-reassuring  prenatal testing, hypertensive disease or preeclampsia.   6) Data do not consistently support use of LDA for prevention of recurrent stillbirth unless associated with FGR.  7) Recommend early and usual GDM screening secondary to findings from previous pregnancy loss.  8) Recommend checking freeT4 and TSH every 3 months. The initial TSH was low most likely secondary to concurrent effect of early pregnancy HCG levels. This should be resolved by this time.     Thank you for referring this kind couple for consultation with [facility] today.  If you have any questions regarding her consultation, please feel free to contact us here at the [facility] clinic.     Parker Moya MD   Maternal Fetal Medicine Specialist     The patient had all her questions answered. She voiced understanding of the plan of care and her satisfaction with our care today. Thank you for the opportunity to participate in the care of Ms. Rojas. Please do not hesitate to contact us if you may have any questions or concerns.    I spent 15 minutes prior to the visit preparing to see the patient (reviewing medical records and tests).   I spent 30 minutes face-face-to-face with the patient during the visit with the majority (>50%) spent on counseling and coordination of care with the patient and/or family members.   I spent 15 minutes after the visit with the patient documenting the visit in the electronic health record and/or communicating with other health care professionals and/or care coordination.   Total time spent on today s date of service: 60 minutes.

## 2021-09-01 ENCOUNTER — PRENATAL OFFICE VISIT (OUTPATIENT)
Dept: OBGYN | Facility: CLINIC | Age: 31
End: 2021-09-01
Payer: COMMERCIAL

## 2021-09-01 VITALS
BODY MASS INDEX: 28.56 KG/M2 | DIASTOLIC BLOOD PRESSURE: 73 MMHG | OXYGEN SATURATION: 100 % | SYSTOLIC BLOOD PRESSURE: 117 MMHG | HEART RATE: 116 BPM | WEIGHT: 132 LBS

## 2021-09-01 DIAGNOSIS — O36.4XX0 IUFD AT 20 WEEKS OR MORE OF GESTATION: ICD-10-CM

## 2021-09-01 DIAGNOSIS — O09.292 PRIOR PREGNANCY WITH FETAL DEMISE AND CURRENT PREGNANCY IN SECOND TRIMESTER: Primary | ICD-10-CM

## 2021-09-01 PROCEDURE — 99207 PR PRENATAL VISIT: CPT | Performed by: OBSTETRICS & GYNECOLOGY

## 2021-09-01 NOTE — ASSESSMENT & PLAN NOTE
[facility] RECOMMENDATIONS:   We reviewed all the recommendations previously discussed:   1) We discussed prenatal genetic screening and testing.  We would recommend discussing option for cell free DNA testing in maternal serum.  2) Comprehensive ultrasound at 18-20 weeks was already done today and was normal with normal growth.    3) Serial ultrasounds for fetal growth every 4 weeks beginning at the time of the comprehensive ultrasound until delivery. Please assess for EFW > 90th percentile or AC accelerated growth (>90th percentile) and or for presence of polyhydramnios.   4)  surveillance with weekly BPP beginning at 32 weeks' gestation until delivery.  5) Deliver at 39 weeks or as indicated by  surveillance large AC, polyhydramnios, non-reassuring prenatal testing, hypertensive disease or preeclampsia.   6) Data do not consistently support use of LDA for prevention of recurrent stillbirth unless associated with FGR.  7) Recommend early and usual GDM screening secondary to findings from previous pregnancy loss.  8) Recommend checking freeT4 and TSH every 3 months. The initial TSH was low most likely secondary to concurrent effect of early pregnancy HCG levels.

## 2021-09-01 NOTE — PROGRESS NOTES
26w3d    She has not been seen for almost Doing well without issues/concerns.  Routine anticipatory guidance.    She has not been seen for almost 8 weeks.    Labs ordered  Ultrasound ordered.   She has seen MFM  MFM RECOMMENDATIONS:      We reviewed all the recommendations previously discussed:   1) We discussed prenatal genetic screening and testing.  We would recommend discussing option for cell free DNA testing in maternal serum.  2) Comprehensive ultrasound at 18-20 weeks was already done today and was normal with normal growth.    3) Serial ultrasounds for fetal growth every 4 weeks beginning at the time of the comprehensive ultrasound until delivery. Please assess for EFW > 90th percentile or AC accelerated growth (>90th percentile) and or for presence of polyhydramnios.   4)  surveillance with weekly BPP beginning at 32 weeks' gestation until delivery.  5) Deliver at 39 weeks or as indicated by  surveillance large AC, polyhydramnios, non-reassuring prenatal testing, hypertensive disease or preeclampsia.   6) Data do not consistently support use of LDA for prevention of recurrent stillbirth unless associated with FGR.  7) Recommend early and usual GDM screening secondary to findings from previous pregnancy loss.  8) Recommend checking freeT4 and TSH every 3 months. The initial TSH was low most likely secondary to concurrent effect of early pregnancy HCG levels.

## 2021-09-14 ENCOUNTER — ANCILLARY PROCEDURE (OUTPATIENT)
Dept: ULTRASOUND IMAGING | Facility: CLINIC | Age: 31
End: 2021-09-14
Attending: OBSTETRICS & GYNECOLOGY
Payer: COMMERCIAL

## 2021-09-14 ENCOUNTER — PRENATAL OFFICE VISIT (OUTPATIENT)
Dept: OBGYN | Facility: CLINIC | Age: 31
End: 2021-09-14
Payer: COMMERCIAL

## 2021-09-14 VITALS
BODY MASS INDEX: 29.08 KG/M2 | OXYGEN SATURATION: 100 % | SYSTOLIC BLOOD PRESSURE: 113 MMHG | HEART RATE: 104 BPM | DIASTOLIC BLOOD PRESSURE: 74 MMHG | WEIGHT: 134.4 LBS

## 2021-09-14 DIAGNOSIS — O09.292 PRIOR PREGNANCY WITH FETAL DEMISE AND CURRENT PREGNANCY IN SECOND TRIMESTER: ICD-10-CM

## 2021-09-14 DIAGNOSIS — R73.09 ABNORMAL GLUCOSE TOLERANCE TEST: ICD-10-CM

## 2021-09-14 DIAGNOSIS — O09.292 PRIOR PREGNANCY WITH FETAL DEMISE AND CURRENT PREGNANCY IN SECOND TRIMESTER: Primary | ICD-10-CM

## 2021-09-14 DIAGNOSIS — O09.291 PRIOR PREGNANCY WITH FETAL DEMISE AND CURRENT PREGNANCY IN FIRST TRIMESTER: Primary | ICD-10-CM

## 2021-09-14 PROCEDURE — 99207 PR PRENATAL VISIT: CPT | Performed by: OBSTETRICS & GYNECOLOGY

## 2021-09-14 PROCEDURE — 76816 OB US FOLLOW-UP PER FETUS: CPT | Performed by: RADIOLOGY

## 2021-09-14 NOTE — PROGRESS NOTES
28w2d   Doing well.  No problems.  Ultrasound today shows good growth and borderline polyhydramnios.  Refer back to [facility]  Labs next Monday  Adi Ku MD

## 2021-09-15 ENCOUNTER — HOSPITAL ENCOUNTER (OUTPATIENT)
Dept: ULTRASOUND IMAGING | Facility: CLINIC | Age: 31
End: 2021-09-15
Attending: OBSTETRICS & GYNECOLOGY
Payer: COMMERCIAL

## 2021-09-15 ENCOUNTER — OFFICE VISIT (OUTPATIENT)
Dept: MATERNAL FETAL MEDICINE | Facility: CLINIC | Age: 31
End: 2021-09-15
Attending: OBSTETRICS & GYNECOLOGY
Payer: COMMERCIAL

## 2021-09-15 DIAGNOSIS — O09.291 PRIOR PREGNANCY WITH FETAL DEMISE AND CURRENT PREGNANCY IN FIRST TRIMESTER: ICD-10-CM

## 2021-09-15 DIAGNOSIS — O26.90 PREGNANCY RELATED CONDITION, ANTEPARTUM: Primary | ICD-10-CM

## 2021-09-15 PROCEDURE — 76816 OB US FOLLOW-UP PER FETUS: CPT

## 2021-09-15 PROCEDURE — 76816 OB US FOLLOW-UP PER FETUS: CPT | Mod: 26 | Performed by: OBSTETRICS & GYNECOLOGY

## 2021-09-15 NOTE — PROGRESS NOTES
The patient was seen for an ultrasound in the M[facility]etal Medicine Center at the [facility] today.  For a detailed report of the ultrasound examination, please see the ultrasound report which can be found under the imaging tab.    Antonieta Randall MD  , OB/GYN  Maternal-Fetal Medicine  [phone] (Pager)

## 2021-09-20 ENCOUNTER — LAB (OUTPATIENT)
Dept: LAB | Facility: CLINIC | Age: 31
End: 2021-09-20
Payer: COMMERCIAL

## 2021-09-20 DIAGNOSIS — O09.292 PRIOR PREGNANCY WITH FETAL DEMISE AND CURRENT PREGNANCY IN SECOND TRIMESTER: ICD-10-CM

## 2021-09-20 LAB
GLUCOSE 1H P 50 G GLC PO SERPL-MCNC: 177 MG/DL (ref 70–129)
HGB BLD-MCNC: 10.5 G/DL (ref 11.7–15.7)
T4 FREE SERPL-MCNC: 0.98 NG/DL (ref 0.76–1.46)
TSH SERPL DL<=0.005 MIU/L-ACNC: 0.21 MU/L (ref 0.4–4)

## 2021-09-20 PROCEDURE — 82952 GTT-ADDED SAMPLES: CPT

## 2021-09-20 PROCEDURE — 86780 TREPONEMA PALLIDUM: CPT

## 2021-09-20 PROCEDURE — 36415 COLL VENOUS BLD VENIPUNCTURE: CPT

## 2021-09-20 PROCEDURE — 86803 HEPATITIS C AB TEST: CPT

## 2021-09-20 PROCEDURE — 84443 ASSAY THYROID STIM HORMONE: CPT

## 2021-09-20 PROCEDURE — 85018 HEMOGLOBIN: CPT

## 2021-09-20 PROCEDURE — 84439 ASSAY OF FREE THYROXINE: CPT

## 2021-09-21 LAB
HCV AB SERPL QL IA: NONREACTIVE
T PALLIDUM AB SER QL: NONREACTIVE

## 2021-09-23 ENCOUNTER — DOCUMENTATION ONLY (OUTPATIENT)
Dept: LAB | Facility: CLINIC | Age: 31
End: 2021-09-23

## 2021-09-23 ENCOUNTER — LAB (OUTPATIENT)
Dept: LAB | Facility: CLINIC | Age: 31
End: 2021-09-23
Payer: COMMERCIAL

## 2021-09-23 DIAGNOSIS — R73.09 ABNORMAL GLUCOSE TOLERANCE TEST: ICD-10-CM

## 2021-09-23 DIAGNOSIS — O09.292 PRIOR PREGNANCY WITH FETAL DEMISE AND CURRENT PREGNANCY IN SECOND TRIMESTER: ICD-10-CM

## 2021-09-23 NOTE — PROGRESS NOTES
Patient came into the [facility] lab for her 3 hour glucose tolerance test on 9.23.2021. Patient vomited within minutes after drinking the glucose drink. Patient and  are concerned that this will keep happening and are requesting if there is another method for her. Please contact patient and advise.      I have cancelled the test and futured it.     Catina Erickson on 9/23/2021 at 8:17 AM

## 2021-09-24 NOTE — PROGRESS NOTES
Unable to reach patient via phone. Left message to call clinic back at [phone].    Chani Santos RN

## 2021-09-24 NOTE — PROGRESS NOTES
We will do a fasting lab and then a one hour postprandial.   Have her come to clinic an hour before her appointment and have the fasting lab.  She may then go back home to eat and one hour later have her lab appointment and the ob visit.   Thanks  Adi Ku MD

## 2021-09-28 ENCOUNTER — PRENATAL OFFICE VISIT (OUTPATIENT)
Dept: OBGYN | Facility: CLINIC | Age: 31
End: 2021-09-28
Payer: COMMERCIAL

## 2021-09-28 VITALS
BODY MASS INDEX: 29.39 KG/M2 | WEIGHT: 135.8 LBS | HEART RATE: 110 BPM | OXYGEN SATURATION: 99 % | DIASTOLIC BLOOD PRESSURE: 78 MMHG | SYSTOLIC BLOOD PRESSURE: 121 MMHG

## 2021-09-28 DIAGNOSIS — R73.09 ABNORMAL GLUCOSE TOLERANCE TEST: ICD-10-CM

## 2021-09-28 DIAGNOSIS — O99.013 ANEMIA DURING PREGNANCY IN THIRD TRIMESTER: ICD-10-CM

## 2021-09-28 DIAGNOSIS — O09.292 PRIOR PREGNANCY WITH FETAL DEMISE AND CURRENT PREGNANCY IN SECOND TRIMESTER: Primary | ICD-10-CM

## 2021-09-28 LAB
FASTING STATUS PATIENT QL REPORTED: YES
GLUCOSE 1H P 50 G GLC PO SERPL-MCNC: 107 MG/DL (ref 70–129)
GLUCOSE BLD-MCNC: 94 MG/DL (ref 70–99)

## 2021-09-28 PROCEDURE — 82947 ASSAY GLUCOSE BLOOD QUANT: CPT | Mod: 59 | Performed by: OBSTETRICS & GYNECOLOGY

## 2021-09-28 PROCEDURE — 99207 PR PRENATAL VISIT: CPT | Performed by: OBSTETRICS & GYNECOLOGY

## 2021-09-28 PROCEDURE — 36415 COLL VENOUS BLD VENIPUNCTURE: CPT | Performed by: OBSTETRICS & GYNECOLOGY

## 2021-09-28 PROCEDURE — 82952 GTT-ADDED SAMPLES: CPT | Performed by: OBSTETRICS & GYNECOLOGY

## 2021-09-28 RX ORDER — FERROUS GLUCONATE 324(38)MG
324 TABLET ORAL
Qty: 90 TABLET | Refills: 2 | Status: SHIPPED | OUTPATIENT
Start: 2021-09-28 | End: 2022-01-25

## 2021-09-28 NOTE — PROGRESS NOTES
Patient came in for OB visit today and lab test was ordered and being done today.  Closing message  SAKSHI Perez 9/28/2021       .

## 2021-09-28 NOTE — PROGRESS NOTES
Pt has not read Boomr message.  Unable to reach patient via phone. Unable to leave message, phone is not accepting voicemails.    HENRI ShultzN RN

## 2021-10-06 ENCOUNTER — HOSPITAL ENCOUNTER (OUTPATIENT)
Dept: ULTRASOUND IMAGING | Facility: CLINIC | Age: 31
End: 2021-10-06
Attending: OBSTETRICS & GYNECOLOGY
Payer: COMMERCIAL

## 2021-10-06 ENCOUNTER — OFFICE VISIT (OUTPATIENT)
Dept: MATERNAL FETAL MEDICINE | Facility: CLINIC | Age: 31
End: 2021-10-06
Attending: OBSTETRICS & GYNECOLOGY
Payer: COMMERCIAL

## 2021-10-06 DIAGNOSIS — O09.293 PRIOR PREGNANCY WITH FETAL DEMISE AND CURRENT PREGNANCY IN THIRD TRIMESTER: Primary | ICD-10-CM

## 2021-10-06 DIAGNOSIS — O26.90 PREGNANCY RELATED CONDITION, ANTEPARTUM: ICD-10-CM

## 2021-10-06 PROCEDURE — 76816 OB US FOLLOW-UP PER FETUS: CPT

## 2021-10-06 PROCEDURE — 76819 FETAL BIOPHYS PROFIL W/O NST: CPT | Mod: 26 | Performed by: OBSTETRICS & GYNECOLOGY

## 2021-10-06 PROCEDURE — 76819 FETAL BIOPHYS PROFIL W/O NST: CPT

## 2021-10-06 PROCEDURE — 76816 OB US FOLLOW-UP PER FETUS: CPT | Mod: 26 | Performed by: OBSTETRICS & GYNECOLOGY

## 2021-10-06 NOTE — PROGRESS NOTES
Ema Rojas was seen for an ultrasound today at the Maternal-Fetal Medicine center.      For the details of the ultrasound please see the report which can be found under the imaging tab.      Alyse Thompson MD  , OB/GYN  Maternal-Fetal Medicine  [email or web address]  [phone] (Main M Office)  [phone] or [phone] (for 24 hour MFM questions)  [phone] (Pager)

## 2021-10-11 ENCOUNTER — HEALTH MAINTENANCE LETTER (OUTPATIENT)
Age: 31
End: 2021-10-11

## 2021-10-11 NOTE — PROGRESS NOTES
32w1d   Tired.  No HA, visual changes, N/V   She is doing glucose checks at home and she reports them to be normal ranges.  She has the fasting and the 1 hour postprandial today with us.  She will bring her monitor with her to compare the glucose readings today with the home monitor.   She is following with MFM.   RTC 2 weeks.

## 2021-10-12 ENCOUNTER — HOSPITAL ENCOUNTER (OUTPATIENT)
Dept: ULTRASOUND IMAGING | Facility: CLINIC | Age: 31
End: 2021-10-12
Attending: OBSTETRICS & GYNECOLOGY
Payer: COMMERCIAL

## 2021-10-12 ENCOUNTER — OFFICE VISIT (OUTPATIENT)
Dept: MATERNAL FETAL MEDICINE | Facility: CLINIC | Age: 31
End: 2021-10-12
Attending: OBSTETRICS & GYNECOLOGY
Payer: COMMERCIAL

## 2021-10-12 DIAGNOSIS — O09.293 PRIOR PREGNANCY WITH FETAL DEMISE AND CURRENT PREGNANCY IN THIRD TRIMESTER: ICD-10-CM

## 2021-10-12 DIAGNOSIS — O40.3XX0 POLYHYDRAMNIOS IN THIRD TRIMESTER COMPLICATION, SINGLE OR UNSPECIFIED FETUS: Primary | ICD-10-CM

## 2021-10-12 PROCEDURE — 76819 FETAL BIOPHYS PROFIL W/O NST: CPT | Mod: 26 | Performed by: OBSTETRICS & GYNECOLOGY

## 2021-10-12 PROCEDURE — 76819 FETAL BIOPHYS PROFIL W/O NST: CPT

## 2021-10-12 NOTE — PROGRESS NOTES
Please see full imaging report from [employer] program under imaging tab.    Moderate poly in context of prior IUFD  Checking glucose values and will review with Dr. Ku tomorrow.     Leigha Bronson MD  Maternal Fetal Medicine

## 2021-10-13 ENCOUNTER — PRENATAL OFFICE VISIT (OUTPATIENT)
Dept: OBGYN | Facility: CLINIC | Age: 31
End: 2021-10-13
Payer: COMMERCIAL

## 2021-10-13 VITALS
WEIGHT: 138.4 LBS | SYSTOLIC BLOOD PRESSURE: 106 MMHG | HEART RATE: 109 BPM | DIASTOLIC BLOOD PRESSURE: 62 MMHG | BODY MASS INDEX: 29.95 KG/M2 | OXYGEN SATURATION: 100 %

## 2021-10-13 DIAGNOSIS — O36.4XX0 IUFD AT 20 WEEKS OR MORE OF GESTATION: ICD-10-CM

## 2021-10-13 DIAGNOSIS — O09.291 PRIOR PREGNANCY WITH FETAL DEMISE, ANTEPARTUM, FIRST TRIMESTER: ICD-10-CM

## 2021-10-13 DIAGNOSIS — O99.013 ANEMIA DURING PREGNANCY IN THIRD TRIMESTER: Primary | ICD-10-CM

## 2021-10-13 PROCEDURE — 99207 PR PRENATAL VISIT: CPT | Performed by: OBSTETRICS & GYNECOLOGY

## 2021-10-13 NOTE — PROGRESS NOTES
32w3d.    Tired.  No HA, visual changes, N/V   Glucoses reviewed.  Diet reviewed.  We discussed diet changes and she should have FBS and 1 hour PP checks, (4 times a day total).   RTC 1 week for glucose check and review.   Adi Ku MD

## 2021-10-13 NOTE — PATIENT INSTRUCTIONS
Please check your glucoses 4 times a day.   You will need to check a fasting level and then check 1 hour after each meal.   Please record your type of food, and how much.

## 2021-10-15 ENCOUNTER — OFFICE VISIT (OUTPATIENT)
Dept: MATERNAL FETAL MEDICINE | Facility: CLINIC | Age: 31
End: 2021-10-15
Attending: OBSTETRICS & GYNECOLOGY
Payer: COMMERCIAL

## 2021-10-15 ENCOUNTER — HOSPITAL ENCOUNTER (OUTPATIENT)
Dept: ULTRASOUND IMAGING | Facility: CLINIC | Age: 31
End: 2021-10-15
Attending: OBSTETRICS & GYNECOLOGY
Payer: COMMERCIAL

## 2021-10-15 DIAGNOSIS — O09.293 PRIOR PREGNANCY WITH FETAL DEMISE AND CURRENT PREGNANCY IN THIRD TRIMESTER: ICD-10-CM

## 2021-10-15 DIAGNOSIS — O40.3XX0 POLYHYDRAMNIOS IN THIRD TRIMESTER COMPLICATION, SINGLE OR UNSPECIFIED FETUS: Primary | ICD-10-CM

## 2021-10-15 PROCEDURE — 76819 FETAL BIOPHYS PROFIL W/O NST: CPT

## 2021-10-15 PROCEDURE — 76819 FETAL BIOPHYS PROFIL W/O NST: CPT | Mod: 26 | Performed by: OBSTETRICS & GYNECOLOGY

## 2021-10-15 NOTE — PROGRESS NOTES
"Please see \"Imaging\" tab under \"Chart Review\" for details of today's visit.    Stacy Calero    "

## 2021-10-19 ENCOUNTER — PRENATAL OFFICE VISIT (OUTPATIENT)
Dept: OBGYN | Facility: CLINIC | Age: 31
End: 2021-10-19
Payer: COMMERCIAL

## 2021-10-19 VITALS
SYSTOLIC BLOOD PRESSURE: 97 MMHG | DIASTOLIC BLOOD PRESSURE: 65 MMHG | WEIGHT: 138.4 LBS | HEART RATE: 104 BPM | OXYGEN SATURATION: 100 % | BODY MASS INDEX: 29.95 KG/M2

## 2021-10-19 DIAGNOSIS — O99.013 ANEMIA DURING PREGNANCY IN THIRD TRIMESTER: Primary | ICD-10-CM

## 2021-10-19 DIAGNOSIS — O09.293 PRIOR PREGNANCY WITH FETAL DEMISE AND CURRENT PREGNANCY IN THIRD TRIMESTER: ICD-10-CM

## 2021-10-19 PROCEDURE — 99207 PR PRENATAL VISIT: CPT | Performed by: OBSTETRICS & GYNECOLOGY

## 2021-10-19 NOTE — PROGRESS NOTES
33w2d.    Tired.  No HA, visual changes, N/V   Good FM, No ROM, No vaginal bleeding  She has been checking her FBS and 1 hour PP.  The fasting are less than 95 and the 1 hour values are less than 130.  We discussed the changes she has made to have her glucoses in better control.  She will continue with the changes.  She is also aware that with advancing gestation, her glucoses might increase and she will likely to make additional dietary changes to keep them controlled.  Questions seem to be answered.   RTC 2 weeks  Adi Ku MD

## 2021-10-26 ENCOUNTER — HOSPITAL ENCOUNTER (OUTPATIENT)
Dept: ULTRASOUND IMAGING | Facility: CLINIC | Age: 31
End: 2021-10-26
Attending: OBSTETRICS & GYNECOLOGY
Payer: COMMERCIAL

## 2021-10-26 ENCOUNTER — OFFICE VISIT (OUTPATIENT)
Dept: MATERNAL FETAL MEDICINE | Facility: CLINIC | Age: 31
End: 2021-10-26
Attending: OBSTETRICS & GYNECOLOGY
Payer: COMMERCIAL

## 2021-10-26 DIAGNOSIS — Z87.59: Primary | ICD-10-CM

## 2021-10-26 DIAGNOSIS — O09.293 PRIOR PREGNANCY WITH FETAL DEMISE AND CURRENT PREGNANCY IN THIRD TRIMESTER: ICD-10-CM

## 2021-10-26 PROCEDURE — 76819 FETAL BIOPHYS PROFIL W/O NST: CPT

## 2021-10-26 PROCEDURE — 76819 FETAL BIOPHYS PROFIL W/O NST: CPT | Mod: 26 | Performed by: OBSTETRICS & GYNECOLOGY

## 2021-10-26 NOTE — PROGRESS NOTES
Please see the imaging tab for details of the ultrasound performed today.    Margy Quinn MD  Specialist in Maternal-Fetal Medicine

## 2021-10-29 ENCOUNTER — HOSPITAL ENCOUNTER (OUTPATIENT)
Dept: ULTRASOUND IMAGING | Facility: CLINIC | Age: 31
End: 2021-10-29
Attending: OBSTETRICS & GYNECOLOGY
Payer: COMMERCIAL

## 2021-10-29 ENCOUNTER — OFFICE VISIT (OUTPATIENT)
Dept: MATERNAL FETAL MEDICINE | Facility: CLINIC | Age: 31
End: 2021-10-29
Attending: OBSTETRICS & GYNECOLOGY
Payer: COMMERCIAL

## 2021-10-29 DIAGNOSIS — O09.293 PRIOR PREGNANCY WITH FETAL DEMISE AND CURRENT PREGNANCY IN THIRD TRIMESTER: ICD-10-CM

## 2021-10-29 DIAGNOSIS — O09.293 PRIOR PREGNANCY WITH FETAL DEMISE AND CURRENT PREGNANCY IN THIRD TRIMESTER: Primary | ICD-10-CM

## 2021-10-29 PROCEDURE — 76816 OB US FOLLOW-UP PER FETUS: CPT | Mod: 26 | Performed by: OBSTETRICS & GYNECOLOGY

## 2021-10-29 PROCEDURE — 76816 OB US FOLLOW-UP PER FETUS: CPT

## 2021-10-29 PROCEDURE — 76819 FETAL BIOPHYS PROFIL W/O NST: CPT

## 2021-10-29 PROCEDURE — 76819 FETAL BIOPHYS PROFIL W/O NST: CPT | Mod: 26 | Performed by: OBSTETRICS & GYNECOLOGY

## 2021-10-29 NOTE — PROGRESS NOTES
"Please see \"Imaging\" tab under \"Chart Review\" for details of today's US.    Nazanin Wiggins, DO    "

## 2021-11-02 ENCOUNTER — OFFICE VISIT (OUTPATIENT)
Dept: MATERNAL FETAL MEDICINE | Facility: CLINIC | Age: 31
End: 2021-11-02
Attending: OBSTETRICS & GYNECOLOGY
Payer: COMMERCIAL

## 2021-11-02 ENCOUNTER — HOSPITAL ENCOUNTER (OUTPATIENT)
Dept: ULTRASOUND IMAGING | Facility: CLINIC | Age: 31
End: 2021-11-02
Attending: OBSTETRICS & GYNECOLOGY
Payer: COMMERCIAL

## 2021-11-02 DIAGNOSIS — O09.293 PRIOR PREGNANCY WITH FETAL DEMISE AND CURRENT PREGNANCY IN THIRD TRIMESTER: Primary | ICD-10-CM

## 2021-11-02 DIAGNOSIS — O40.3XX0 POLYHYDRAMNIOS IN THIRD TRIMESTER COMPLICATION, SINGLE OR UNSPECIFIED FETUS: ICD-10-CM

## 2021-11-02 DIAGNOSIS — O09.293 PRIOR PREGNANCY WITH FETAL DEMISE AND CURRENT PREGNANCY IN THIRD TRIMESTER: ICD-10-CM

## 2021-11-02 PROCEDURE — 76819 FETAL BIOPHYS PROFIL W/O NST: CPT

## 2021-11-02 PROCEDURE — 76819 FETAL BIOPHYS PROFIL W/O NST: CPT | Mod: 26 | Performed by: OBSTETRICS & GYNECOLOGY

## 2021-11-02 NOTE — PROGRESS NOTES
"Please see \"Imaging\" tab under \"Chart Review\" for details of today's US at the [facility].    Albert Johnson MD  Maternal-Fetal Medicine      "

## 2021-11-05 ENCOUNTER — PRENATAL OFFICE VISIT (OUTPATIENT)
Dept: OBGYN | Facility: CLINIC | Age: 31
End: 2021-11-05
Payer: COMMERCIAL

## 2021-11-05 VITALS
HEART RATE: 105 BPM | WEIGHT: 140.8 LBS | BODY MASS INDEX: 30.47 KG/M2 | SYSTOLIC BLOOD PRESSURE: 115 MMHG | DIASTOLIC BLOOD PRESSURE: 76 MMHG | OXYGEN SATURATION: 100 %

## 2021-11-05 DIAGNOSIS — O09.293 PRIOR PREGNANCY WITH FETAL DEMISE AND CURRENT PREGNANCY IN THIRD TRIMESTER: Primary | ICD-10-CM

## 2021-11-05 DIAGNOSIS — O99.013 ANEMIA DURING PREGNANCY IN THIRD TRIMESTER: ICD-10-CM

## 2021-11-05 DIAGNOSIS — O40.3XX0 POLYHYDRAMNIOS IN THIRD TRIMESTER COMPLICATION, SINGLE OR UNSPECIFIED FETUS: ICD-10-CM

## 2021-11-05 DIAGNOSIS — R73.09 ABNORMAL GLUCOSE TOLERANCE TEST: ICD-10-CM

## 2021-11-05 PROCEDURE — 99207 PR PRENATAL VISIT: CPT | Performed by: OBSTETRICS & GYNECOLOGY

## 2021-11-08 ENCOUNTER — OFFICE VISIT (OUTPATIENT)
Dept: MATERNAL FETAL MEDICINE | Facility: CLINIC | Age: 31
End: 2021-11-08
Attending: OBSTETRICS & GYNECOLOGY
Payer: COMMERCIAL

## 2021-11-08 ENCOUNTER — HOSPITAL ENCOUNTER (OUTPATIENT)
Dept: ULTRASOUND IMAGING | Facility: CLINIC | Age: 31
End: 2021-11-08
Attending: OBSTETRICS & GYNECOLOGY
Payer: COMMERCIAL

## 2021-11-08 DIAGNOSIS — O09.293 PRIOR PREGNANCY WITH FETAL DEMISE AND CURRENT PREGNANCY IN THIRD TRIMESTER: Primary | ICD-10-CM

## 2021-11-08 DIAGNOSIS — O24.419 GESTATIONAL DIABETES MELLITUS (GDM) IN THIRD TRIMESTER, GESTATIONAL DIABETES METHOD OF CONTROL UNSPECIFIED: ICD-10-CM

## 2021-11-08 DIAGNOSIS — O09.293 PRIOR PREGNANCY WITH FETAL DEMISE AND CURRENT PREGNANCY IN THIRD TRIMESTER: ICD-10-CM

## 2021-11-08 PROCEDURE — 76819 FETAL BIOPHYS PROFIL W/O NST: CPT

## 2021-11-08 PROCEDURE — 76819 FETAL BIOPHYS PROFIL W/O NST: CPT | Mod: 26 | Performed by: OBSTETRICS & GYNECOLOGY

## 2021-11-08 NOTE — PROGRESS NOTES
"35w5d    No HA, visual changes, N/V etc.    Labor plan and warning s/s discussed.   We discussed the recommendations from MFM regarding delivery at 37 weeks.  She is adamant that she desires to wait until 38 or 39 weeks, perhaps a little later.  We discussed her risk factors (prior IUFD, polyhydramnios, possible gestational diabetes) and how these risks might affect this pregnancy.  She repeated states that it is in \"God's Hands\" and what ever He desires will be the out come.  We discussed the induction and the risk factors for about 25 minutes.  She will discuss with MFM next Tuesday.     RTC 1 week  Total time preparing to see patient with reviewing prior encounter and labs, face to face time,  and coordinating care on the same calendar date:  30 minutes    Adi Ku MD    "

## 2021-11-10 ENCOUNTER — PRENATAL OFFICE VISIT (OUTPATIENT)
Dept: OBGYN | Facility: CLINIC | Age: 31
End: 2021-11-10
Payer: COMMERCIAL

## 2021-11-10 VITALS
HEART RATE: 133 BPM | OXYGEN SATURATION: 100 % | WEIGHT: 139.2 LBS | BODY MASS INDEX: 30.12 KG/M2 | SYSTOLIC BLOOD PRESSURE: 120 MMHG | DIASTOLIC BLOOD PRESSURE: 72 MMHG

## 2021-11-10 DIAGNOSIS — O99.013 ANEMIA DURING PREGNANCY IN THIRD TRIMESTER: ICD-10-CM

## 2021-11-10 DIAGNOSIS — O09.293 PRIOR PREGNANCY WITH FETAL DEMISE AND CURRENT PREGNANCY IN THIRD TRIMESTER: Primary | ICD-10-CM

## 2021-11-10 DIAGNOSIS — O40.3XX0 POLYHYDRAMNIOS IN THIRD TRIMESTER COMPLICATION, SINGLE OR UNSPECIFIED FETUS: ICD-10-CM

## 2021-11-10 DIAGNOSIS — Z36.85 SCREENING, ANTENATAL, FOR STREPTOCOCCUS B: ICD-10-CM

## 2021-11-10 PROCEDURE — 99207 PR PRENATAL VISIT: CPT | Performed by: OBSTETRICS & GYNECOLOGY

## 2021-11-10 PROCEDURE — 87653 STREP B DNA AMP PROBE: CPT | Performed by: OBSTETRICS & GYNECOLOGY

## 2021-11-10 NOTE — PROGRESS NOTES
36w3d    No HA, visual changes, N/V etc.    Labor plan and warning s/s discussed.   She had [facility] visit yesterday and they agreed to have the induction for 11/19/2021 at 37.5 weeks gestation.    Beta strep testing today  RTC 1 week  Adi Ku MD.

## 2021-11-11 LAB
GP B STREP DNA SPEC QL NAA+PROBE: NEGATIVE
PATIENT PENICILLIN, AMOXICILLIN, CEPHALOSPORINS ALLERGY: NO

## 2021-11-14 ENCOUNTER — CARE COORDINATION (OUTPATIENT)
Dept: OBGYN | Facility: CLINIC | Age: 31
End: 2021-11-14
Payer: COMMERCIAL

## 2021-11-14 NOTE — PROGRESS NOTES
I called labor and delivery and she is scheduled to come to L&D on Thursday, November 18, 2021, for cervical ripening, at 37.4 weeks gestation.  She has seen MFM for pregnancy with prior IUFD, polyhydramnios, and presumed gestational diabetes, diet controlled.  MFM and patient decided on the medical induction at 37.5 weeks gestation.

## 2021-11-16 ENCOUNTER — OFFICE VISIT (OUTPATIENT)
Dept: MATERNAL FETAL MEDICINE | Facility: CLINIC | Age: 31
End: 2021-11-16
Attending: OBSTETRICS & GYNECOLOGY
Payer: COMMERCIAL

## 2021-11-16 ENCOUNTER — HOSPITAL ENCOUNTER (OUTPATIENT)
Dept: ULTRASOUND IMAGING | Facility: CLINIC | Age: 31
End: 2021-11-16
Attending: OBSTETRICS & GYNECOLOGY
Payer: COMMERCIAL

## 2021-11-16 DIAGNOSIS — O09.293 PRIOR PREGNANCY WITH FETAL DEMISE AND CURRENT PREGNANCY IN THIRD TRIMESTER: ICD-10-CM

## 2021-11-16 DIAGNOSIS — O24.419 GESTATIONAL DIABETES MELLITUS (GDM) IN THIRD TRIMESTER, GESTATIONAL DIABETES METHOD OF CONTROL UNSPECIFIED: Primary | ICD-10-CM

## 2021-11-16 PROCEDURE — 76819 FETAL BIOPHYS PROFIL W/O NST: CPT | Mod: 26 | Performed by: OBSTETRICS & GYNECOLOGY

## 2021-11-16 PROCEDURE — 76819 FETAL BIOPHYS PROFIL W/O NST: CPT

## 2021-11-17 ENCOUNTER — PRENATAL OFFICE VISIT (OUTPATIENT)
Dept: OBGYN | Facility: CLINIC | Age: 31
End: 2021-11-17
Payer: COMMERCIAL

## 2021-11-17 VITALS
OXYGEN SATURATION: 99 % | WEIGHT: 138.2 LBS | HEART RATE: 112 BPM | BODY MASS INDEX: 29.91 KG/M2 | SYSTOLIC BLOOD PRESSURE: 121 MMHG | DIASTOLIC BLOOD PRESSURE: 75 MMHG

## 2021-11-17 DIAGNOSIS — O40.3XX0 POLYHYDRAMNIOS IN THIRD TRIMESTER COMPLICATION, SINGLE OR UNSPECIFIED FETUS: ICD-10-CM

## 2021-11-17 DIAGNOSIS — O09.293 PRIOR PREGNANCY WITH FETAL DEMISE AND CURRENT PREGNANCY IN THIRD TRIMESTER: Primary | ICD-10-CM

## 2021-11-17 DIAGNOSIS — O99.013 ANEMIA DURING PREGNANCY IN THIRD TRIMESTER: ICD-10-CM

## 2021-11-17 PROCEDURE — 59426 ANTEPARTUM CARE ONLY: CPT | Performed by: OBSTETRICS & GYNECOLOGY

## 2021-11-17 PROCEDURE — 99207 PR PRENATAL VISIT: CPT | Performed by: OBSTETRICS & GYNECOLOGY

## 2021-11-17 NOTE — PROGRESS NOTES
37w3d    No HA, visual changes, N/V  Labor plan and warning s/s discussed.   Induction and cervical ripening to start tomorrow pm.  Instructions and phone number given.   Adi Ku MD

## 2021-11-28 ENCOUNTER — MEDICAL CORRESPONDENCE (OUTPATIENT)
Dept: HEALTH INFORMATION MANAGEMENT | Facility: CLINIC | Age: 31
End: 2021-11-28
Payer: COMMERCIAL

## 2022-01-25 ENCOUNTER — PRENATAL OFFICE VISIT (OUTPATIENT)
Dept: OBGYN | Facility: CLINIC | Age: 32
End: 2022-01-25
Payer: COMMERCIAL

## 2022-01-25 VITALS
HEART RATE: 71 BPM | SYSTOLIC BLOOD PRESSURE: 131 MMHG | WEIGHT: 130.2 LBS | DIASTOLIC BLOOD PRESSURE: 81 MMHG | OXYGEN SATURATION: 100 % | BODY MASS INDEX: 28.18 KG/M2

## 2022-01-25 PROCEDURE — 99207 PR POST PARTUM EXAM: CPT | Performed by: OBSTETRICS & GYNECOLOGY

## 2022-01-25 ASSESSMENT — PATIENT HEALTH QUESTIONNAIRE - PHQ9: SUM OF ALL RESPONSES TO PHQ QUESTIONS 1-9: 0

## 2022-01-25 NOTE — PROGRESS NOTES
POSTPARTUM VISIT:    Delivery Information:    Date:  11/20/2021  Route:  Vaginal delivery   Sex:  Male     Weight:  3515 g      Apgars:  8/9  Reviewed pregnancy and birth.  Doing well.  No significant symptoms.  Infant doing fine.  Breast feeding:  no  Bottle:  yes  Formula:  yes    Exam:  /81 (BP Location: Right arm, Cuff Size: Adult Regular)   Pulse 71   Wt 59.1 kg (130 lb 3.2 oz)   LMP 01/09/2021 (Exact Date)   SpO2 100%   Breastfeeding No   BMI 28.18 kg/m    PHQ-9 = 0  General:  WNWD female, NAD  Alert  Oriented x 3  Gait:  Normal  Skin:  Normal skin turgor  HEENT:  NC/AT, EOMI  Abdomen:  Non-tender, non-distended.  Pelvic exam:  Not performed today, she is bleeding     Reviewed contraception plans.  We reviewed the options available, the side effects, risks, benefits and instructions on proper use.  They are going to use Natural Family Planning   Pap smear performed  No, due 1 year.   Continue general medical care.

## 2022-01-29 ENCOUNTER — TELEPHONE (OUTPATIENT)
Dept: BEHAVIORAL HEALTH | Facility: CLINIC | Age: 32
End: 2022-01-29

## 2022-01-29 ENCOUNTER — HOSPITAL ENCOUNTER (INPATIENT)
Facility: CLINIC | Age: 32
LOS: 4 days | Discharge: HOME | End: 2022-02-03
Attending: FAMILY MEDICINE | Admitting: PSYCHIATRY & NEUROLOGY
Payer: COMMERCIAL

## 2022-01-29 DIAGNOSIS — R41.82 ALTERED MENTAL STATUS, UNSPECIFIED ALTERED MENTAL STATUS TYPE: ICD-10-CM

## 2022-01-29 DIAGNOSIS — Z11.52 ENCOUNTER FOR SCREENING LABORATORY TESTING FOR SEVERE ACUTE RESPIRATORY SYNDROME CORONAVIRUS 2 (SARS-COV-2): ICD-10-CM

## 2022-01-29 DIAGNOSIS — K59.00 CONSTIPATION, UNSPECIFIED CONSTIPATION TYPE: ICD-10-CM

## 2022-01-29 DIAGNOSIS — F29 PSYCHOSIS, UNSPECIFIED PSYCHOSIS TYPE (H): ICD-10-CM

## 2022-01-29 DIAGNOSIS — F51.04 PSYCHOPHYSIOLOGICAL INSOMNIA: ICD-10-CM

## 2022-01-29 DIAGNOSIS — G25.9 EXTRAPYRAMIDAL AND MOVEMENT DISORDER: Primary | ICD-10-CM

## 2022-01-29 LAB
ALBUMIN SERPL-MCNC: 3.6 G/DL (ref 3.4–5)
ALP SERPL-CCNC: 68 U/L (ref 40–150)
ALT SERPL W P-5'-P-CCNC: 30 U/L (ref 0–50)
AMPHETAMINES UR QL SCN: ABNORMAL
ANION GAP SERPL CALCULATED.3IONS-SCNC: 4 MMOL/L (ref 3–14)
AST SERPL W P-5'-P-CCNC: 17 U/L (ref 0–45)
BARBITURATES UR QL: ABNORMAL
BASOPHILS # BLD AUTO: 0.1 10E3/UL (ref 0–0.2)
BASOPHILS NFR BLD AUTO: 1 %
BENZODIAZ UR QL: ABNORMAL
BILIRUB SERPL-MCNC: 0.5 MG/DL (ref 0.2–1.3)
BUN SERPL-MCNC: 9 MG/DL (ref 7–30)
CALCIUM SERPL-MCNC: 8.7 MG/DL (ref 8.5–10.1)
CANNABINOIDS UR QL SCN: ABNORMAL
CHLORIDE BLD-SCNC: 108 MMOL/L (ref 94–109)
CO2 SERPL-SCNC: 27 MMOL/L (ref 20–32)
COCAINE UR QL: ABNORMAL
CREAT SERPL-MCNC: 0.47 MG/DL (ref 0.52–1.04)
EOSINOPHIL # BLD AUTO: 0.1 10E3/UL (ref 0–0.7)
EOSINOPHIL NFR BLD AUTO: 1 %
ERYTHROCYTE [DISTWIDTH] IN BLOOD BY AUTOMATED COUNT: 21.5 % (ref 10–15)
GFR SERPL CREATININE-BSD FRML MDRD: >90 ML/MIN/1.73M2
GLUCOSE BLD-MCNC: 95 MG/DL (ref 70–99)
HCG SERPL QL: NEGATIVE
HCT VFR BLD AUTO: 36.8 % (ref 35–47)
HGB BLD-MCNC: 11.4 G/DL (ref 11.7–15.7)
IMM GRANULOCYTES # BLD: 0 10E3/UL
IMM GRANULOCYTES NFR BLD: 0 %
LYMPHOCYTES # BLD AUTO: 1.7 10E3/UL (ref 0.8–5.3)
LYMPHOCYTES NFR BLD AUTO: 32 %
MCH RBC QN AUTO: 25.2 PG (ref 26.5–33)
MCHC RBC AUTO-ENTMCNC: 31 G/DL (ref 31.5–36.5)
MCV RBC AUTO: 81 FL (ref 78–100)
MONOCYTES # BLD AUTO: 0.5 10E3/UL (ref 0–1.3)
MONOCYTES NFR BLD AUTO: 10 %
NEUTROPHILS # BLD AUTO: 3 10E3/UL (ref 1.6–8.3)
NEUTROPHILS NFR BLD AUTO: 56 %
NRBC # BLD AUTO: 0 10E3/UL
NRBC BLD AUTO-RTO: 0 /100
OPIATES UR QL SCN: ABNORMAL
PLATELET # BLD AUTO: 195 10E3/UL (ref 150–450)
POTASSIUM BLD-SCNC: 3.3 MMOL/L (ref 3.4–5.3)
PROT SERPL-MCNC: 7.2 G/DL (ref 6.8–8.8)
RBC # BLD AUTO: 4.52 10E6/UL (ref 3.8–5.2)
SARS-COV-2 RNA RESP QL NAA+PROBE: NEGATIVE
SODIUM SERPL-SCNC: 139 MMOL/L (ref 133–144)
TSH SERPL DL<=0.005 MIU/L-ACNC: 1.39 MU/L (ref 0.4–4)
WBC # BLD AUTO: 5.4 10E3/UL (ref 4–11)

## 2022-01-29 PROCEDURE — 99291 CRITICAL CARE FIRST HOUR: CPT | Performed by: FAMILY MEDICINE

## 2022-01-29 PROCEDURE — 96372 THER/PROPH/DIAG INJ SC/IM: CPT | Performed by: FAMILY MEDICINE

## 2022-01-29 PROCEDURE — 85025 COMPLETE CBC W/AUTO DIFF WBC: CPT | Performed by: FAMILY MEDICINE

## 2022-01-29 PROCEDURE — 99285 EMERGENCY DEPT VISIT HI MDM: CPT | Performed by: FAMILY MEDICINE

## 2022-01-29 PROCEDURE — 84443 ASSAY THYROID STIM HORMONE: CPT | Performed by: FAMILY MEDICINE

## 2022-01-29 PROCEDURE — 87635 SARS-COV-2 COVID-19 AMP PRB: CPT | Performed by: FAMILY MEDICINE

## 2022-01-29 PROCEDURE — C9803 HOPD COVID-19 SPEC COLLECT: HCPCS | Performed by: FAMILY MEDICINE

## 2022-01-29 PROCEDURE — 80053 COMPREHEN METABOLIC PANEL: CPT | Performed by: FAMILY MEDICINE

## 2022-01-29 PROCEDURE — 84703 CHORIONIC GONADOTROPIN ASSAY: CPT | Performed by: FAMILY MEDICINE

## 2022-01-29 PROCEDURE — 80307 DRUG TEST PRSMV CHEM ANLYZR: CPT | Performed by: FAMILY MEDICINE

## 2022-01-29 PROCEDURE — 250N000011 HC RX IP 250 OP 636: Performed by: FAMILY MEDICINE

## 2022-01-29 PROCEDURE — 36415 COLL VENOUS BLD VENIPUNCTURE: CPT | Performed by: FAMILY MEDICINE

## 2022-01-29 RX ORDER — OLANZAPINE 10 MG/2ML
10 INJECTION, POWDER, FOR SOLUTION INTRAMUSCULAR ONCE
Status: COMPLETED | OUTPATIENT
Start: 2022-01-29 | End: 2022-01-29

## 2022-01-29 RX ADMIN — OLANZAPINE 10 MG: 10 INJECTION, POWDER, FOR SOLUTION INTRAMUSCULAR at 20:23
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
## 2022-01-29 NOTE — ED NOTES
Bed: ED14  Expected date: 1/29/22  Expected time: 1:17 PM  Means of arrival: Ambulance  Comments:  [facility]: 30yo F, Manic, aggressive, spitting, in full restraints.
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
## 2022-01-29 NOTE — ED NOTES
Within an hour after restraint an in person face to face assessment was completed at 1432, including an evaluation of the patient's immediate reaction to the intervention, behavioral assessment and review/assessment of history, drugs and medications, recent labs, etc., and behavioral condition.  The patient experienced: No adverse physical outcome from seclusion/restraint initiation.  The intervention of restraint or seclusion needs to continue.     Efe Alanis MD  01/29/22 2661

## 2022-01-29 NOTE — ED NOTES
"Pt was making homicidal comments as verbalized by the . Pt said, \"I will shoot you.\" Other statements were, \"coronavirus is not real.\" After making these comments, pt went to bed.   "

## 2022-01-30 ENCOUNTER — TELEPHONE (OUTPATIENT)
Dept: BEHAVIORAL HEALTH | Facility: CLINIC | Age: 32
End: 2022-01-30
Payer: COMMERCIAL

## 2022-01-30 PROBLEM — F29 PSYCHOSIS (H): Status: ACTIVE | Noted: 2020-11-24

## 2022-01-30 PROCEDURE — 124N000002 HC R&B MH UMMC

## 2022-01-30 PROCEDURE — 250N000013 HC RX MED GY IP 250 OP 250 PS 637: Performed by: PSYCHIATRY & NEUROLOGY

## 2022-01-30 RX ORDER — HYDROXYZINE HYDROCHLORIDE 25 MG/1
25 TABLET, FILM COATED ORAL EVERY 4 HOURS PRN
Status: DISCONTINUED | OUTPATIENT
Start: 2022-01-30 | End: 2022-02-03 | Stop reason: HOSPADM

## 2022-01-30 RX ORDER — MAGNESIUM HYDROXIDE/ALUMINUM HYDROXICE/SIMETHICONE 120; 1200; 1200 MG/30ML; MG/30ML; MG/30ML
30 SUSPENSION ORAL EVERY 4 HOURS PRN
Status: DISCONTINUED | OUTPATIENT
Start: 2022-01-30 | End: 2022-02-03 | Stop reason: HOSPADM

## 2022-01-30 RX ORDER — ACETAMINOPHEN 325 MG/1
650 TABLET ORAL EVERY 4 HOURS PRN
Status: DISCONTINUED | OUTPATIENT
Start: 2022-01-30 | End: 2022-02-03 | Stop reason: HOSPADM

## 2022-01-30 RX ORDER — TRAZODONE HYDROCHLORIDE 50 MG/1
50 TABLET, FILM COATED ORAL
Status: DISCONTINUED | OUTPATIENT
Start: 2022-01-30 | End: 2022-02-03 | Stop reason: HOSPADM

## 2022-01-30 RX ORDER — OLANZAPINE 10 MG/2ML
10 INJECTION, POWDER, FOR SOLUTION INTRAMUSCULAR 3 TIMES DAILY PRN
Status: DISCONTINUED | OUTPATIENT
Start: 2022-01-30 | End: 2022-02-03 | Stop reason: HOSPADM

## 2022-01-30 RX ORDER — OLANZAPINE 10 MG/1
10 TABLET ORAL 3 TIMES DAILY PRN
Status: DISCONTINUED | OUTPATIENT
Start: 2022-01-30 | End: 2022-02-03 | Stop reason: HOSPADM

## 2022-01-30 RX ADMIN — TRAZODONE HYDROCHLORIDE 50 MG: 50 TABLET ORAL at 23:15

## 2022-01-30 RX ADMIN — OLANZAPINE 10 MG: 10 TABLET, FILM COATED ORAL at 23:15

## 2022-01-30 ASSESSMENT — ACTIVITIES OF DAILY LIVING (ADL)
HYGIENE/GROOMING: INDEPENDENT
LAUNDRY: UNABLE TO COMPLETE
ORAL_HYGIENE: INDEPENDENT
DRESS: INDEPENDENT

## 2022-01-30 NOTE — ED PROVIDER NOTES
[facility] ED Mental Health Handoff Note:       Brief HPI:  This is a 31 year old female signed out to me by Dr. Alanis.  See initial ED Provider note for full details of the presentation. Interval history is pertinent for recurrent agitation.    Home meds reviewed and ordered/administered: Yes    Medically stable for inpatient mental health admission: Yes.    Evaluated by mental health: Yes. The recommendation is for inpatient mental health treatment. Bed search in process    Safety concerns: At the time I received sign out, the patient required medications for agitation and is now more calm.    Hold Status:  Active Orders   Legal    Emergency Hospitalization Hold (72 Hr Hold)     Frequency: Effective Now     Start Date/Time: 01/29/22 1606      Number of Occurrences: Until Specified           Exam:   Patient Vitals for the past 24 hrs:   BP Temp Pulse Resp SpO2   01/29/22 1340 98/64 (!) 96.6  F (35.9  C) 81 14 100 %           ED Course:    Medications   OLANZapine (zyPREXA) injection 10 mg (10 mg Intramuscular Given 1/29/22 2023)            There were significant events during my shift.  Patient required IM Zyprexa but was then cooperative.    Patient was signed out to the oncoming provider, Dr. Enamorado      Impression:    ICD-10-CM    1. Altered mental status, unspecified altered mental status type  R41.82    2. Psychosis, unspecified psychosis type (H)  F29        Plan:    1. Awaiting inpatient mental health admission/transfer.      RESULTS:   Results for orders placed or performed during the hospital encounter of 01/29/22 (from the past 24 hour(s))   CBC with platelets differential     Status: Abnormal    Collection Time: 01/29/22  1:57 PM    Narrative    The following orders were created for panel order CBC with platelets differential.  Procedure                               Abnormality         Status                     ---------                               -----------         ------                      CBC with platelets and d...[[id number]]  Abnormal            Final result                 Please view results for these tests on the individual orders.   Comprehensive metabolic panel     Status: Abnormal    Collection Time: 01/29/22  1:57 PM   Result Value Ref Range    Sodium 139 133 - 144 mmol/L    Potassium 3.3 (L) 3.4 - 5.3 mmol/L    Chloride 108 94 - 109 mmol/L    Carbon Dioxide (CO2) 27 20 - 32 mmol/L    Anion Gap 4 3 - 14 mmol/L    Urea Nitrogen 9 7 - 30 mg/dL    Creatinine 0.47 (L) 0.52 - 1.04 mg/dL    Calcium 8.7 8.5 - 10.1 mg/dL    Glucose 95 70 - 99 mg/dL    Alkaline Phosphatase 68 40 - 150 U/L    AST 17 0 - 45 U/L    ALT 30 0 - 50 U/L    Protein Total 7.2 6.8 - 8.8 g/dL    Albumin 3.6 3.4 - 5.0 g/dL    Bilirubin Total 0.5 0.2 - 1.3 mg/dL    GFR Estimate >90 >60 mL/min/1.73m2   TSH with free T4 reflex     Status: Normal    Collection Time: 01/29/22  1:57 PM   Result Value Ref Range    TSH 1.39 0.40 - 4.00 mU/L   HCG qualitative Blood     Status: Normal    Collection Time: 01/29/22  1:57 PM   Result Value Ref Range    hCG Serum Qualitative Negative Negative   CBC with platelets and differential     Status: Abnormal    Collection Time: 01/29/22  1:57 PM   Result Value Ref Range    WBC Count 5.4 4.0 - 11.0 10e3/uL    RBC Count 4.52 3.80 - 5.20 10e6/uL    Hemoglobin 11.4 (L) 11.7 - 15.7 g/dL    Hematocrit 36.8 35.0 - 47.0 %    MCV 81 78 - 100 fL    MCH 25.2 (L) 26.5 - 33.0 pg    MCHC 31.0 (L) 31.5 - 36.5 g/dL    RDW 21.5 (H) 10.0 - 15.0 %    Platelet Count 195 150 - 450 10e3/uL    % Neutrophils 56 %    % Lymphocytes 32 %    % Monocytes 10 %    % Eosinophils 1 %    % Basophils 1 %    % Immature Granulocytes 0 %    NRBCs per 100 WBC 0 <1 /100    Absolute Neutrophils 3.0 1.6 - 8.3 10e3/uL    Absolute Lymphocytes 1.7 0.8 - 5.3 10e3/uL    Absolute Monocytes 0.5 0.0 - 1.3 10e3/uL    Absolute Eosinophils 0.1 0.0 - 0.7 10e3/uL    Absolute Basophils 0.1 0.0 - 0.2 10e3/uL    Absolute Immature Granulocytes 0.0 <=0.4  10e3/uL    Absolute NRBCs 0.0 10e3/uL   Asymptomatic COVID-19 Virus (Coronavirus) by PCR Nose     Status: Normal    Collection Time: 01/29/22  2:10 PM    Specimen: Nose; Swab   Result Value Ref Range    SARS CoV2 PCR Negative Negative    Narrative    Testing was performed using the steve  SARS-CoV-2 & Influenza A/B Assay on the steve  [employer]  System.  This test should be ordered for the detection of SARS-COV-2 in individuals who meet SARS-CoV-2 clinical and/or epidemiological criteria. Test performance is unknown in asymptomatic patients.  This test is for in vitro diagnostic use under the FDA EUA for laboratories certified under CLIA to perform moderate and/or high complexity testing. This test has not been FDA cleared or approved.  A negative test does not rule out the presence of PCR inhibitors in the specimen or target RNA in concentration below the limit of detection for the assay. The possibility of a false negative should be considered if the patient's recent exposure or clinical presentation suggests COVID-19.  [facility] are certified under the Clinical Laboratory Improvement Amendments of 1988 (CLIA-88) as qualified to perform moderate and/or high complexity laboratory testing.   Urine Drugs of Abuse Screen     Status: Abnormal    Collection Time: 01/29/22  7:55 PM    Narrative    The following orders were created for panel order Urine Drugs of Abuse Screen.  Procedure                               Abnormality         Status                     ---------                               -----------         ------                     Drug abuse screen 1 urin...[[id number]]  Abnormal            Final result                 Please view results for these tests on the individual orders.   Drug abuse screen 1 urine (ED)     Status: Abnormal    Collection Time: 01/29/22  7:55 PM   Result Value Ref Range    Amphetamines Urine Screen Negative Screen Negative    Barbiturates Urine Screen Negative  Screen Negative    Benzodiazepines Urine Screen Positive (A) Screen Negative    Cannabinoids Urine Screen Negative Screen Negative    Cocaine Urine Screen Negative Screen Negative    Opiates Urine Screen Negative Screen Negative             MD Marleny Spring Eric Girard, MD  01/30/22 0109

## 2022-01-30 NOTE — ED NOTES
Pt's  visited pt. He expressed concerns about how pt is not vaccinated for covid19, and they have a 2 month old baby at home. Please encourage pt to wear mask when out of room, make sure there is a blue mattress sheet on the mattress, pt has socks on and so forth.

## 2022-01-30 NOTE — ED PROVIDER NOTES
[facility] ED Mental Health Handoff Note:       Brief HPI:  This is a 31 year old female signed out to me by Dr. evans.  See initial ED Provider note for full details of the presentation. Interval history is pertinent for no acute changes.   was asking us to continue asking her to wear a mask to prevent covid since she is not vaccinated. We will continue to encourage wearing a mask. He was also upset because her mattress didn't have a sheet on it last night.  There is a sheet on it today.  We will continue to help support here while in the ED and help with comfort and safety.     Home meds reviewed and ordered/administered: Yes    Medically stable for inpatient mental health admission: Yes.    Evaluated by mental health: Yes. The recommendation is for inpatient mental health treatment. Bed search in process    Safety concerns: At the time I received sign out, there were no safety concerns.    Hold Status:  Active Orders   Legal    Emergency Hospitalization Hold (72 Hr Hold)     Frequency: Effective Now     Start Date/Time: 01/29/22 1606      Number of Occurrences: Until Specified           Exam:   Patient Vitals for the past 24 hrs:   BP Temp Pulse Resp SpO2   01/30/22 0258 93/61 99.1  F (37.3  C) 84 16 100 %   01/29/22 1340 98/64 (!) 96.6  F (35.9  C) 81 14 100 %           ED Course:    Medications   OLANZapine (zyPREXA) injection 10 mg (10 mg Intramuscular Given 1/29/22 2023)            There were no significant events during my shift.          Impression:    ICD-10-CM    1. Altered mental status, unspecified altered mental status type  R41.82    2. Psychosis, unspecified psychosis type (H)  F29        Plan:    1. Inpatient bed placement found.       RESULTS:   Results for orders placed or performed during the hospital encounter of 01/29/22 (from the past 24 hour(s))   CBC with platelets differential     Status: Abnormal    Collection Time: 01/29/22  1:57 PM    Narrative    The following orders  were created for panel order CBC with platelets differential.  Procedure                               Abnormality         Status                     ---------                               -----------         ------                     CBC with platelets and d...[[id number]]  Abnormal            Final result                 Please view results for these tests on the individual orders.   Comprehensive metabolic panel     Status: Abnormal    Collection Time: 01/29/22  1:57 PM   Result Value Ref [facility]    Sodium 139 133 - 144 mmol/L    Potassium 3.3 (L) 3.4 - 5.3 mmol/L    Chloride 108 94 - 109 mmol/L    Carbon Dioxide (CO2) 27 20 - 32 mmol/L    Anion Gap 4 3 - 14 mmol/L    Urea Nitrogen 9 7 - 30 mg/dL    Creatinine 0.47 (L) 0.52 - 1.04 mg/dL    Calcium 8.7 8.5 - 10.1 mg/dL    Glucose 95 70 - 99 mg/dL    Alkaline Phosphatase 68 40 - 150 U/L    AST 17 0 - 45 U/L    ALT 30 0 - 50 U/L    Protein Total 7.2 6.8 - 8.8 g/dL    Albumin 3.6 3.4 - 5.0 g/dL    Bilirubin Total 0.5 0.2 - 1.3 mg/dL    GFR Estimate >90 >60 mL/min/1.73m2   TSH with free T4 reflex     Status: Normal    Collection Time: 01/29/22  1:57 PM   Result Value Ref Range    TSH 1.39 0.40 - 4.00 mU/L   HCG qualitative Blood     Status: Normal    Collection Time: 01/29/22  1:57 PM   Result Value Ref Range    hCG Serum Qualitative Negative Negative   CBC with platelets and differential     Status: Abnormal    Collection Time: 01/29/22  1:57 PM   Result Value Ref Range    WBC Count 5.4 4.0 - 11.0 10e3/uL    RBC Count 4.52 3.80 - 5.20 10e6/uL    Hemoglobin 11.4 (L) 11.7 - 15.7 g/dL    Hematocrit 36.8 35.0 - 47.0 %    MCV 81 78 - 100 fL    MCH 25.2 (L) 26.5 - 33.0 pg    MCHC 31.0 (L) 31.5 - 36.5 g/dL    RDW 21.5 (H) 10.0 - 15.0 %    Platelet Count 195 150 - 450 10e3/uL    % Neutrophils 56 %    % Lymphocytes 32 %    % Monocytes 10 %    % Eosinophils 1 %    % Basophils 1 %    % Immature Granulocytes 0 %    NRBCs per 100 WBC 0 <1 /100    Absolute Neutrophils 3.0 1.6 - 8.3  10e3/uL    Absolute Lymphocytes 1.7 0.8 - 5.3 10e3/uL    Absolute Monocytes 0.5 0.0 - 1.3 10e3/uL    Absolute Eosinophils 0.1 0.0 - 0.7 10e3/uL    Absolute Basophils 0.1 0.0 - 0.2 10e3/uL    Absolute Immature Granulocytes 0.0 <=0.4 10e3/uL    Absolute NRBCs 0.0 10e3/uL   Asymptomatic COVID-19 Virus (Coronavirus) by PCR Nose     Status: Normal    Collection Time: 01/29/22  2:10 PM    Specimen: Nose; Swab   Result Value Ref [place]    SARS CoV2 PCR Negative Negative    Narrative    Testing was performed using the steve  SARS-CoV-2 & Influenza A/B Assay on the steve  Haritha  System.  This test should be ordered for the detection of SARS-COV-2 in individuals who meet SARS-CoV-2 clinical and/or epidemiological criteria. Test performance is unknown in asymptomatic patients.  This test is for in vitro diagnostic use under the FDA EUA for laboratories certified under CLIA to perform moderate and/or high complexity testing. This test has not been FDA cleared or approved.  A negative test does not rule out the presence of PCR inhibitors in the specimen or target RNA in concentration below the limit of detection for the assay. The possibility of a false negative should be considered if the patient's recent exposure or clinical presentation suggests COVID-19.  [facility] are certified under the Clinical Laboratory Improvement Amendments of 1988 (CLIA-88) as qualified to perform moderate and/or high complexity laboratory testing.   Urine Drugs of Abuse Screen     Status: Abnormal    Collection Time: 01/29/22  7:55 PM    Narrative    The following orders were created for panel order Urine Drugs of Abuse Screen.  Procedure                               Abnormality         Status                     ---------                               -----------         ------                     Drug abuse screen 1 urin...[[id number]]  Abnormal            Final result                 Please view results for these tests on the  individual orders.   Drug abuse screen 1 urine (ED)     Status: Abnormal    Collection Time: 01/29/22  7:55 PM   Result Value Ref Range    Amphetamines Urine Screen Negative Screen Negative    Barbiturates Urine Screen Negative Screen Negative    Benzodiazepines Urine Screen Positive (A) Screen Negative    Cannabinoids Urine Screen Negative Screen Negative    Cocaine Urine Screen Negative Screen Negative    Opiates Urine Screen Negative Screen Negative             MD Rayo Mack Cara, MD  01/30/22 6240

## 2022-01-30 NOTE — ED NOTES
[facility] ED Mental Health Handoff Note:       Brief HPI:  This is a 31 year old female signed out to me by Dr. Farmer.  See initial ED Provider note for full details of the presentation. Interval history is pertinent for no acute isseus overnight.    Home meds reviewed and ordered/administered: Yes    Medically stable for inpatient mental health admission: Yes.    Evaluated by mental health: Yes. The recommendation is for inpatient mental health treatment. Bed search in process    Safety concerns: At the time I received sign out, there were no safety concerns.    Hold Status:  Active Orders   Legal    Emergency Hospitalization Hold (72 Hr Hold)     Frequency: Effective Now     Start Date/Time: 01/29/22 1606      Number of Occurrences: Until Specified            Exam:   Patient Vitals for the past 24 hrs:   BP Temp Pulse Resp SpO2   01/30/22 0258 93/61 99.1  F (37.3  C) 84 16 100 %   01/29/22 1340 98/64 (!) 96.6  F (35.9  C) 81 14 100 %           ED Course:    Medications   OLANZapine (zyPREXA) injection 10 mg (10 mg Intramuscular Given 1/29/22 2023)            There were no significant events during my shift.    Patient was signed out to the oncoming provider, Dr. Cade.      Impression:    ICD-10-CM    1. Altered mental status, unspecified altered mental status type  R41.82    2. Psychosis, unspecified psychosis type (H)  F29        Plan:    1. Awaiting inpatient mental health admission/transfer.      RESULTS:   Results for orders placed or performed during the hospital encounter of 01/29/22 (from the past 24 hour(s))   CBC with platelets differential     Status: Abnormal    Collection Time: 01/29/22  1:57 PM    Narrative    The following orders were created for panel order CBC with platelets differential.  Procedure                               Abnormality         Status                     ---------                               -----------         ------                     CBC with platelets and  monste.[[id number]]  Abnormal            Final result                 Please view results for these tests on the individual orders.   Comprehensive metabolic panel     Status: Abnormal    Collection Time: 01/29/22  1:57 PM   Result Value Ref Range    Sodium 139 133 - 144 mmol/L    Potassium 3.3 (L) 3.4 - 5.3 mmol/L    Chloride 108 94 - 109 mmol/L    Carbon Dioxide (CO2) 27 20 - 32 mmol/L    Anion Gap 4 3 - 14 mmol/L    Urea Nitrogen 9 7 - 30 mg/dL    Creatinine 0.47 (L) 0.52 - 1.04 mg/dL    Calcium 8.7 8.5 - 10.1 mg/dL    Glucose 95 70 - 99 mg/dL    Alkaline Phosphatase 68 40 - 150 U/L    AST 17 0 - 45 U/L    ALT 30 0 - 50 U/L    Protein Total 7.2 6.8 - 8.8 g/dL    Albumin 3.6 3.4 - 5.0 g/dL    Bilirubin Total 0.5 0.2 - 1.3 mg/dL    GFR Estimate >90 >60 mL/min/1.73m2   TSH with free T4 reflex     Status: Normal    Collection Time: 01/29/22  1:57 PM   Result Value Ref Range    TSH 1.39 0.40 - 4.00 mU/L   HCG qualitative Blood     Status: Normal    Collection Time: 01/29/22  1:57 PM   Result Value Ref Range    hCG Serum Qualitative Negative Negative   CBC with platelets and differential     Status: Abnormal    Collection Time: 01/29/22  1:57 PM   Result Value Ref Range    WBC Count 5.4 4.0 - 11.0 10e3/uL    RBC Count 4.52 3.80 - 5.20 10e6/uL    Hemoglobin 11.4 (L) 11.7 - 15.7 g/dL    Hematocrit 36.8 35.0 - 47.0 %    MCV 81 78 - 100 fL    MCH 25.2 (L) 26.5 - 33.0 pg    MCHC 31.0 (L) 31.5 - 36.5 g/dL    RDW 21.5 (H) 10.0 - 15.0 %    Platelet Count 195 150 - 450 10e3/uL    % Neutrophils 56 %    % Lymphocytes 32 %    % Monocytes 10 %    % Eosinophils 1 %    % Basophils 1 %    % Immature Granulocytes 0 %    NRBCs per 100 WBC 0 <1 /100    Absolute Neutrophils 3.0 1.6 - 8.3 10e3/uL    Absolute Lymphocytes 1.7 0.8 - 5.3 10e3/uL    Absolute Monocytes 0.5 0.0 - 1.3 10e3/uL    Absolute Eosinophils 0.1 0.0 - 0.7 10e3/uL    Absolute Basophils 0.1 0.0 - 0.2 10e3/uL    Absolute Immature Granulocytes 0.0 <=0.4 10e3/uL    Absolute NRBCs  0.0 10e3/uL   Asymptomatic COVID-19 Virus (Coronavirus) by PCR Nose     Status: Normal    Collection Time: 01/29/22  2:10 PM    Specimen: Nose; Swab   Result Value Ref Range    SARS CoV2 PCR Negative Negative    Narrative    Testing was performed using the steve  SARS-CoV-2 & Influenza A/B Assay on the steve  Harith[employer]  System.  This test should be ordered for the detection of SARS-COV-2 in individuals who meet SARS-CoV-2 clinical and/or epidemiological criteria. Test performance is unknown in asymptomatic patients.  This test is for in vitro diagnostic use under the FDA EUA for laboratories certified under CLIA to perform moderate and/or high complexity testing. This test has not been FDA cleared or approved.  A negative test does not rule out the presence of PCR inhibitors in the specimen or target RNA in concentration below the limit of detection for the assay. The possibility of a false negative should be considered if the patient's recent exposure or clinical presentation suggests COVID-19.  [facility] are certified under the Clinical Laboratory Improvement Amendments of 1988 (CLIA-88) as qualified to perform moderate and/or high complexity laboratory testing.   Urine Drugs of Abuse Screen     Status: Abnormal    Collection Time: 01/29/22  7:55 PM    Narrative    The following orders were created for panel order Urine Drugs of Abuse Screen.  Procedure                               Abnormality         Status                     ---------                               -----------         ------                     Drug abuse screen 1 urin...[[id number]]  Abnormal            Final result                 Please view results for these tests on the individual orders.   Drug abuse screen 1 urine (ED)     Status: Abnormal    Collection Time: 01/29/22  7:55 PM   Result Value Ref Range    Amphetamines Urine Screen Negative Screen Negative    Barbiturates Urine Screen Negative Screen Negative     Benzodiazepines Urine Screen Positive (A) Screen Negative    Cannabinoids Urine Screen Negative Screen Negative    Cocaine Urine Screen Negative Screen Negative    Opiates Urine Screen Negative Screen Negative             MD Kelsea Lemons, Dara Joseph MD  01/30/22 0660

## 2022-01-30 NOTE — PROGRESS NOTES
Patient admitted to the unit with the following belongings    1 pair of blue jogger pants   2 pair of blue nelys pants   1 floral sleeveless shirt   1 white camisole shirt   1 burgundy long sleeved shirt   1 black and white stripped long sleeved shirt   1 black and gray sweatshirt   1 pair of underwear   1 pair of brown boots   1 black cell phone with a white    2 rings  1 necklace    No medications were found in pt's belongings.     Pt's belongings came in a one plastic bag, and one red gift bag.     All pt's belongings were stored in a locker on the unit. No items were sent to security.     A               Admission:  I am responsible for any personal items that are not sent to the safe or pharmacy.  [facility] is not responsible for loss, theft or damage of any property in my possession.    Signature:  _________________________________ Date: _______  Time: _____                                              Staff Signature:  ____________________________ Date: ________  Time: _____      2nd Staff person, if patient is unable/unwilling to sign:    Signature: ________________________________ Date: ________  Time: _____     Discharge:  [facility] has returned all of my personal belongings:    Signature: _________________________________ Date: ________  Time: _____                                          Staff Signature:  ____________________________ Date: ________  Time: _____

## 2022-01-30 NOTE — TELEPHONE ENCOUNTER
R: per bed search at 11:30 am:  [facility]: @ cap per website  [facility]: @ cap per website  [facility]: @ cap per website  [facility]: @ cap per website  [facility]: @ cap per website  [facility]:@ cap per website  [facility]: @ cap per website  [facility]: pt meets their exclusionary criteria.  [facility] [facility]: @ cap per website   [facility]: @ cap per website  [facility] [place] [facility]: @ cap per website  [facility]: @ cap per website  [facility]: pt meets their exclusionary criteria  [facility]: @ cap per website  [facility]: @ cap per website  [facility] [place]: pt meets their exclusionary criteria  [facility]: pt meets their exclusionary critiera  [facility]: @ cap per website  [facility] per [facility]  [facility]: @ cap per website  [facility]: per Dayna blair at 0304 said they are full with no discharge's until Monday am  [facility]: closed for the next week

## 2022-01-30 NOTE — ED NOTES
Patient slept through my shift, was offered care supplies prior to taking over.  Pt is currently boarding in the ED.  Pt was offered hygiene supplies: Yes.   Pt was offered to ambulate on unit with staff: Yes.  Meal tray ordered for pt: Yes.

## 2022-01-30 NOTE — PROGRESS NOTES
Patient admitted to the unit with the following belongings    1 pair of blue jogger pants   2 pair of blue nelsy pants   1 floral sleeveless shirt   1 white camisole shirt   1 burgundy long sleeved shirt   1 black and white stripped long sleeved shirt   1 black and gray sweatshirt   1 pair of underwear   1 pair of brown boots   1 black cell phone with a white    2 rings  1 necklace    No medications were found in pt's belongings.     Pt's belongings came in a one plastic bag, and one red gift bag.     A               Admission:  I am responsible for any personal items that are not sent to the safe or pharmacy.  [facility] is not responsible for loss, theft or damage of any property in my possession.    Signature:  _________________________________ Date: _______  Time: _____                                              Staff Signature:  ____________________________ Date: ________  Time: _____      2nd Staff person, if patient is unable/unwilling to sign:    Signature: ________________________________ Date: ________  Time: _____     Discharge:  [facility] has returned all of my personal belongings:    Signature: _________________________________ Date: ________  Time: _____                                          Staff Signature:  ____________________________ Date: ________  Time: _____

## 2022-01-30 NOTE — TELEPHONE ENCOUNTER
R:  Bed search update @ 0337:    [facility]: No appropriate beds available  [facility] @ cap  [facility] @ cap  [facility]: @ cap per website  [facility]:@ cap per website  [facility]: @ cap per website  [facility]: @ cap per website  [facility]: @ cap per website  [facility]: @ cap per website  [facility]: @ cap per website  [facility]: Posting 1 bed. Mixed unit/Low acuity. Meets exclusionary criteria  [facility]: @ cap per website  [facility]: @ cap per website  [facility]: @ cap per website  [facility]: @ cap per website  [facility]: @ cap per website  [facility]: @ cap per website   [facility]: Posting 1 bed. Per Butch @ 0257, they only have 1 very low acuity bed available. Pt not appropriate for current bed available.   [facility]: Posting 1 bed. Voluntary patients only; no aggression. Meets exclusionary criteria  [facility]: Posting 2 beds. Must have the cognitive ability to do programming. No aggressive or violent behavior or recent HX in the last 2 yrs. MH must be primary. Meets exclusionary criteria   [facility]: @ cap per website  [facility] @ [facility] per [facility]. Low acuity, Neg Covid.  [facility]: @ cap per website  [facility]: Posting 4 beds. Per Dayna @ 0304, they are full and have no discharges until Monday morning.   [facility]: @ cap per website    Pt remains on work list until appropriate placement is available

## 2022-01-30 NOTE — TELEPHONE ENCOUNTER
1242pm - unit charge on 12 reviewing chart to determine if pt would be appropriate for current mileu. Intake awaiting call back   113pm - charge called and reports pt would be a good fit in the unit mileu   120pm - Jacqueline, on call provider, paged   213pm - Jacqueline paged   218pm - Jacqueline accepts     R: 12/Manan     Pt placed in queue   229pm - unit notified, 4pm for report   231pm - ED notified

## 2022-01-30 NOTE — PHARMACY-ADMISSION MEDICATION HISTORY
Admission Medication History Completed by Pharmacy    See [facility] Admission Navigator for allergy information, preferred outpatient pharmacy, prior to admission medications and immunization status.     Medication history sources:      Surescripts (fill history), CareEverywhere, and patient's  (via telephone)      Pertinent changes made to PTA medication list:  Added: none  Deleted: none  Changed: none    Additional medication history information:     Patient's  denies that the patient is currently taking/being prescribed prescription medications and over-the-counter (OTC) products such as vitamins, sleep aids, etc.       reported she was taking a prenatal vitamin up until ~1 month ago.     reported giving his wife a melatonin today to help her sleep, but that she does not usually take this medication at home.      Prior to Admission medications    Not on File        Date completed: 01/29/22    Medication history completed by:     Nicollette McMann, Karsten  [facility]  Emergency Department: Ascom *[phone]

## 2022-01-30 NOTE — TELEPHONE ENCOUNTER
R:  Inpatient Bed Call Log 1/29/2022  done at 9:30 PM  -No [facility] appropriate beds available    Adults:    [facility] is posting 0 beds.     [facility] is posting 0 beds.    [facility] is posting 0 beds.    [facility] is posting 0 beds.    [facility] is posting 0 beds.    [facility] is posting 0 beds.    [facility] is posting 0 beds.    [facility] is posting 0 beds    [facility] is posting 0 beds.    [facility] is posting 0 beds. Mixed unit 12+. Low acuity only.     [facility] is positing 0 beds. No aggression.     [facility] is posting 0 beds.     [facility] is posting 0 beds. Low acuity only.    [facility] is posting 0 beds.    [facility] is posting 1 bed. Low acuity.     [facility] is posting 2 beds. 72 hr hold required.     [facility] is posting 0  beds.     [facility] is posting 0 beds. Vol only, No Hx of aggression, violence or assault. No sexual offenders. No 72 hr holds.    [facility] is posting 2 beds. (Must have the cognitive ability to do programming. No aggressive or violent behavior or recent HX in the last 2 yrs. MH must be primary.)    [facility] is posting 0 beds. Low acuity only. Violence and aggression capped.     [facility] is posting 1 bed. Low acuity, Neg Covid.     [facility] is posting 1 bed. Covid neg. Vol only. Combined adolescent and adult unit. No aggressive or violent behavior. No registered sex offenders.     [facility] is posting 4 beds. No Covid test required/Call for details.

## 2022-01-31 LAB
FERRITIN SERPL-MCNC: 33 NG/ML (ref 12–150)
HOLD SPECIMEN: NORMAL
IRON SATN MFR SERPL: 14 % (ref 15–46)
IRON SERPL-MCNC: 64 UG/DL (ref 35–180)
POTASSIUM BLD-SCNC: 3.8 MMOL/L (ref 3.4–5.3)
TIBC SERPL-MCNC: 442 UG/DL (ref 240–430)

## 2022-01-31 PROCEDURE — 124N000002 HC R&B MH UMMC

## 2022-01-31 PROCEDURE — 99223 1ST HOSP IP/OBS HIGH 75: CPT | Mod: AI | Performed by: PSYCHIATRY & NEUROLOGY

## 2022-01-31 PROCEDURE — 82728 ASSAY OF FERRITIN: CPT | Performed by: STUDENT IN AN ORGANIZED HEALTH CARE EDUCATION/TRAINING PROGRAM

## 2022-01-31 PROCEDURE — 250N000013 HC RX MED GY IP 250 OP 250 PS 637: Performed by: STUDENT IN AN ORGANIZED HEALTH CARE EDUCATION/TRAINING PROGRAM

## 2022-01-31 PROCEDURE — 84132 ASSAY OF SERUM POTASSIUM: CPT | Performed by: STUDENT IN AN ORGANIZED HEALTH CARE EDUCATION/TRAINING PROGRAM

## 2022-01-31 PROCEDURE — 83550 IRON BINDING TEST: CPT | Performed by: STUDENT IN AN ORGANIZED HEALTH CARE EDUCATION/TRAINING PROGRAM

## 2022-01-31 PROCEDURE — 36415 COLL VENOUS BLD VENIPUNCTURE: CPT | Performed by: STUDENT IN AN ORGANIZED HEALTH CARE EDUCATION/TRAINING PROGRAM

## 2022-01-31 PROCEDURE — G0177 OPPS/PHP; TRAIN & EDUC SERV: HCPCS

## 2022-01-31 RX ORDER — OLANZAPINE 10 MG/1
10 TABLET ORAL AT BEDTIME
Status: DISCONTINUED | OUTPATIENT
Start: 2022-01-31 | End: 2022-02-02

## 2022-01-31 RX ADMIN — OLANZAPINE 10 MG: 10 TABLET, FILM COATED ORAL at 21:34

## 2022-01-31 ASSESSMENT — ACTIVITIES OF DAILY LIVING (ADL)
DRESS: INDEPENDENT
HYGIENE/GROOMING: INDEPENDENT
HYGIENE/GROOMING: INDEPENDENT
DRESS: INDEPENDENT
DRESS: INDEPENDENT;SCRUBS (BEHAVIORAL HEALTH)
HYGIENE/GROOMING: INDEPENDENT
LAUNDRY: UNABLE TO COMPLETE
LAUNDRY: UNABLE TO COMPLETE
ORAL_HYGIENE: INDEPENDENT

## 2022-01-31 NOTE — PLAN OF CARE
BEHAVIORAL TEAM DISCUSSION    Participants: Verito Hinkle MD; Roosevelt Shane MD;  Dyana MOBLEY; Elvia Fernandez RN.   Progress: improving  Anticipated length of stay: 1-2 weeks  Continued Stay Criteria/Rationale: Patient is newly admitted with psychosis.  Evaluation in process  Medical/Physical: no acute medical issues.  Precautions:   Behavioral Orders   Procedures     Assault precautions     Code 1 - Restrict to Unit     Elopement precautions     Routine Programming     As clinically indicated     Status 15     Every 15 minutes.     Plan: Psychiatric evaluation; medication evaluation; referrals for aftercare as appropriate.  Rationale for change in precautions or plan: initial plan.

## 2022-01-31 NOTE — PLAN OF CARE
Problem: Behavioral Health Plan of Care  Goal: Adheres to Safety Considerations for Self and Others  Outcome: No Change  Goal: Optimized Coping Skills in Response to Life Stressors  Outcome: No Change     Pt presented as alert and oriented to place and self throughout shift.  She was visible in the milieu intermittently during the day, social with select peers and participating in some daily OT groups.  Pt was dressed appropriately and did not have any schedule medications to take during this shift.  Pt did not request or require any PRN medications this shift.  She ate meals.  Pt did not practice any ADLs this shift.  She did not appear to be responding to internal stimuli upon nursing assessment.  Pt did not endorse acute physical health concerns or side effects to medications this shift.

## 2022-01-31 NOTE — PLAN OF CARE
Problem: Sleep Disturbance  Goal: Adequate Sleep/Rest  Outcome: No Change    Patient appeared to sleep 5.5 hours this night shift.  Trazodone and Zyprexa given at 2320. No snacks given or requested. Up in the lounge quietly coloring at times during the night.

## 2022-01-31 NOTE — PLAN OF CARE
Initial Psychosocial Assessment    I have reviewed the chart, met with the patient, and developed Care Plan.  Information for assessment was obtained from chart notes.     Presenting Problem:  Patient was admitted on a 72 hour hold with symptoms of psychosis, delusions, Scientologist preoccupation, lack of sleep for about 4 days.  She delivered her baby about 2 months ago.  Prior episode of psychosis in 2020.  Not currently on medications.     History of Mental Health and Chemical Dependency:  Patient was admitted to [facility] with psychosis in November of 2020.  In July same year she had suffered a miscarriage/still birth.  She was prescribed Abilify but per spouse she did not take the medication after discharge.    Family Description (Constellation, Family Psychiatric History):  Patient is  with a 2 month old child    Significant Life Events (Illness, Abuse, Trauma, Death):  Miscarriage 2020    Living Situation:  With spouse and 2 month old child    Educational Background:  Not assessed.    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  Current job is at [employer].  Spouse sending FMLA forms for the treatment team to complete.    Financial Status:  Stable with incomes.    Legal Issues:  None noted    Ethnic/Cultural Issues:  Patient is Salvadorean.    Spiritual Orientation:  Not assessed     Service History:  None     Social Functioning (organization, interests):  Not assessed    Current Treatment Providers are:  None     Social Service Assessment/Plan:  Patient will be seen by the psychiatric provider.  Medications will be offered. She will meet with the treatment team daily to continue to coordinate plan of care. CTC available to assist as needed to ensure appropriate aftercare is in place prior to discharge.
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
## 2022-02-01 PROCEDURE — 250N000013 HC RX MED GY IP 250 OP 250 PS 637: Performed by: PSYCHIATRY & NEUROLOGY

## 2022-02-01 PROCEDURE — G0177 OPPS/PHP; TRAIN & EDUC SERV: HCPCS

## 2022-02-01 PROCEDURE — 124N000002 HC R&B MH UMMC

## 2022-02-01 PROCEDURE — H2032 ACTIVITY THERAPY, PER 15 MIN: HCPCS

## 2022-02-01 PROCEDURE — 250N000013 HC RX MED GY IP 250 OP 250 PS 637: Performed by: STUDENT IN AN ORGANIZED HEALTH CARE EDUCATION/TRAINING PROGRAM

## 2022-02-01 PROCEDURE — 99232 SBSQ HOSP IP/OBS MODERATE 35: CPT | Performed by: PSYCHIATRY & NEUROLOGY

## 2022-02-01 RX ORDER — BENZTROPINE MESYLATE 1 MG/1
1 TABLET ORAL 2 TIMES DAILY PRN
Status: DISCONTINUED | OUTPATIENT
Start: 2022-02-01 | End: 2022-02-03 | Stop reason: HOSPADM

## 2022-02-01 RX ORDER — BENZTROPINE MESYLATE 1 MG/ML
2 INJECTION, SOLUTION INTRAMUSCULAR; INTRAVENOUS 2 TIMES DAILY PRN
Status: DISCONTINUED | OUTPATIENT
Start: 2022-02-01 | End: 2022-02-03 | Stop reason: HOSPADM

## 2022-02-01 RX ADMIN — ACETAMINOPHEN 650 MG: 325 TABLET, FILM COATED ORAL at 16:03

## 2022-02-01 RX ADMIN — OLANZAPINE 10 MG: 10 TABLET, FILM COATED ORAL at 22:04

## 2022-02-01 ASSESSMENT — ACTIVITIES OF DAILY LIVING (ADL)
LAUNDRY: UNABLE TO COMPLETE
ORAL_HYGIENE: INDEPENDENT
ORAL_HYGIENE: INDEPENDENT
HYGIENE/GROOMING: INDEPENDENT
LAUNDRY: UNABLE TO COMPLETE
HYGIENE/GROOMING: INDEPENDENT
DRESS: SCRUBS (BEHAVIORAL HEALTH);INDEPENDENT
DRESS: SCRUBS (BEHAVIORAL HEALTH);INDEPENDENT

## 2022-02-01 NOTE — PLAN OF CARE
"Problem: Behavioral Health Plan of Care  Goal: Adheres to Safety Considerations for Self and Others  Outcome: Improving    Problem: Psychotic Symptoms  Goal: Psychotic Symptoms  Description: Signs and symptoms of listed problems will be absent or manageable.  Outcome: Improving     Problem: Psychotic Symptoms  Goal: Social and Therapeutic (Psychotic Symptoms)  Description: Signs and symptoms of listed problems will be absent or manageable.  Outcome: Improving      Pt is calm and cooperative with cares throughout the shift. Denies SI, SIB, or any mental symptoms. Denies pain, anxiety, or any discomfort. Last BM yesterday per pt. Pt likes watching TV in her room (pt has TV remote with her). Pt stated she like to spend time with people and gets scared to be alone sometimes. Pt video called with her  from her phone for 15 mins around 1900. Pt stated \"I am feeling good, excited, and strong.\" Pt  told me from phone that he emailed FMLA document to fill and sign by a treatment team to [employer] () today. Pt and her  likes to get notified after faxing that FMLA document to her workplace ([employer]) HR after completion. Pt stated that she wanted to get out from here as her 2 months old baby is with her  only at home. No PRN med given. Pt participated in evening group. Pt showered this shift. Pt is med compliant.  "

## 2022-02-01 NOTE — PLAN OF CARE
"Group attendance today:   1    of    4   groups    Group type: topic  Number of participants: 3  Participation level: engaged  Additional notes: Topic group on gratitude and the benefits of how writing down things you are grateful for can improve your mood.  Pt was given a worksheet to identify big things, little things, unexpected things, people, and life lessons she's grateful for.  Pt was willing to do the activity, and seemed to want to do a good job, but was confused by the topics and asked for examples.  Several of her answers were just the examples that writer gave her.   Per pts own answers, she expressed \"having Vincent in my life, gathering together with friends, love, knowledge, wisdom, having God in my life\".    Pt smiled when asked about her son, and shared how old he is  Overall, very polite and pleasant.  Pt did not bring up any particular stressors or concerns in group.    "

## 2022-02-01 NOTE — PROGRESS NOTES
"    ----------------------------------------------------------------------------------------------------------  [facility], [place]   Psychiatric Progress Note  Hospital Day #2     Interim History:   The patient's care was discussed with the treatment team and chart notes were reviewed.    Sleep: 7h documented   Scheduled Medications: Took all   PRN's: None  Staff Report: Uneventful shift. Patient denies all mental health symptoms, states she is feeling better, and does not appear to be making delusional statements or responding to internal stimuli. No evidence of aggression or agitation. Patient is very polite and cooperative with staff. Accepting at bedtime Zyprexa. She is participating in some groups. She did not report any acute physical concerns. Affect is bright. She reported feeling \"so so much better\" and expressed excitement about seeing her . She reported feeling ready to be discharged from the hospital. She denies any SI/HI. She slept well overnight, 7 hours total during night shift.     Patient Interview: Patient was interviewed in her room. She was bright and pleasant on approach. She was praying initially when we entered. She also had a written prayer taped to her door. She explained that she is a Hinduism person and has a strong geri. She said that she slept well, mood is \"better, excited.\" She shared that she misses her baby and wants to be discharged soon, though is amenable with plan to remain in the hospital until Thursday to ensure ongoing improvement in MH symptoms. She said that she feels \"totally different now\" c/t the time of admission. She said that she will \"absolutely\" take Zyprexa upon discharge, adding that she \"loves it.\" She did again state that she would like to become pregnant again in the future, \"but not soon. Maybe in the next 3 years.\" When asked about side effects, she did mention having some difficulty swallowing temporarily following " "administration of Zyprexa. She said that it is now fully resolved. She denied any neck pain or stiffness. She agreed to notify staff immediately if this develops again. She denies any other acute medical concerns or side effects. She denies all symptoms of psychosis. She denies SI/HI.       Review of systems:     ROS was negative unless noted above.          Allergies:   No Known Allergies         Psychiatric Examination:   /76 (BP Location: Left arm)   Pulse 100   Temp 98.4  F (36.9  C) (Oral)   Resp 16   SpO2 100%   Weight is 0 lbs 0 oz  There is no height or weight on file to calculate BMI.    Appearance:  awake, alert, adequately groomed, dressed in hospital scrubs and appeared as age stated  Attitude:  cooperative and very pleasant  Eye Contact:  good  Mood:  \"better, excited!\"  Affect:  mood congruent, intensity is heightened and slightly elated  Speech:  clear, coherent, rapid at times though nonpressured  Psychomotor Behavior:  no evidence of tardive dyskinesia, dystonia, or tics  Thought Process:  logical, linear and goal oriented  Associations:  no loose associations  Thought Content:  no evidence of suicidal ideation or homicidal ideation and no evidence of psychotic thought  Insight:  fair  Judgment:  fair  Oriented to:  time, person, and place  Attention Span and Concentration:  fair  Recent and Remote Memory:  intact  Language: Fluent and conversant in English.  Fund of Knowledge: Appears intact.   Muscle Strength and Tone: normal  Gait and Station: Normal         Labs:   No results found for this or any previous visit (from the past 24 hour(s)).       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      The patient had a similar episode in the immediate postpartum period in November 2020 after her first baby " . She improved rapidly on 5mg Abilify and she discharged within 5 days on Abilify 5mg + trazodone 50mg.   reports she stopped taking her medications as soon as she got home. Interestingly, in spite of non-adherence,  and patient both deny and known psychotic symptoms or mood episodes in between hospitalizations, prior to their onset again 5 days ago. No particular psychosocial stressor was identified apart from giving birth 2 months ago. It is possible that she did develop mood symptoms within 4 weeks of giving birth (and that this current presentation thus represents another peripartum mood episode) and that her symptoms were undetected due to cultural differences in understanding/discussions of mental illness. The other possibilities are a primary psychotic and/or mood disorder: it would be unusual for a primary thought disorder to present as two isolated episodes without residual symptoms in between episodes in spite of no treatment, and it would be unusual for bipolar disorder to only present as manic episodes that responded unusually rapidly to treatment, and without a previous history of depressive episodes. Again, cultural differences in understanding mental health may be confounding the history reported by patient and . For now, it seems the most likely diagnosis is bipolar I disorder, manic, with psychotic features.      The current presentation is consistent with psychosis/ani. Hospitalization is indicated due to safety concerns in the context of a mental illness, of which hospitalization is reasonably expected to result in improvement.    Hospital course: Ema Rojas was admitted to station 12 on a 72 hour hold due to agitated behaviors in the emergency room. She received olanzapine and trazodone the night she was admitted. The following day on 22, she was behaviorally improved, bright affect, pleasant demeanor, and denying all mental health symptoms. She signed in as a  voluntary patient. On 2/1/22, she continued to demonstrate improvement in symptoms and behaviors.     Discontinued Medications (& Rationale):    Assessment 2/1/2022:  She rapidly improved after first dose of olanzapine. This is very encouraging. Although she appears safe to discharge from current clinical perspective, we are recommending ongoing hospitalization at this time to ensure ongoing full resolution of psychosis and to ensure she is able to tolerate Zyprexa without significant side effects. Monitoring for EPSE closely given her report about difficulty swallowing briefly following administration of oral Zyprexa. Patient currently in agreement with this plan.     Plan   Principal Diagnosis:   #Psychosis, unspecified (Bipolar Disorder, Type I vs postpartum psychosis vs primary psychotic illness)    Secondary psychiatric diagnoses of concern this admission:   N/A  Hx of Khat use    Psychotropic Medications:  Modify:  Add Cogentin 1 mg BID orally and IM prn for EPSE    Continue:  - olanzapine 10mg at bedtime     Patient will be treated in therapeutic milieu with appropriate individual and group therapies as described.    Medical diagnoses to be addressed this admission:  No acute medical concerns at this time    Consults: None    Legal Status: Voluntary    Safety Assessment:   Behavioral Orders   Procedures     Assault precautions     Code 1 - Restrict to Unit     Elopement precautions     Routine Programming     As clinically indicated     Status 15     Every 15 minutes.       Disposition: Anticipate discharge to home pending ongoing clinical stabilization. Likely later this week if ongoing resolution of psychotic symptoms.     Natali Hinkle MD  [facility] Psychiatry       Video-Visit Details (Resident physician, Dr. Shane was on site with patient and I was using polycom)    Type of service:  Video Visit    Video Start Time (time video started): 1055    Video End Time (time video stopped):  1105    Originating Location (pt. Location): Other station 12    Distant Location (provider location): Provider remote location    Mode of Communication:  Video Conference via Polycom    Physician has received verbal consent for a Video Visit from the patient? Yes      Verito Hinkle MD

## 2022-02-01 NOTE — PLAN OF CARE
Problem: Sleep Disturbance  Goal: Adequate Sleep/Rest  Outcome: Improving    Patient appeared to sleep 7 hours this night shift.  No prns or snacks given or requested.  No concerns were reported or noted.

## 2022-02-01 NOTE — PROGRESS NOTES
"Pt showered this evening without prompt, asking for a variety of toiletries and hair products.  Ebise was more visible in the milieu, bright affect while talking with multiple staff.  Pt told writer she was feeling \"so so much better\" and was excited to see her .  Pt states she does not have much interest in video calling as it makes her sad, and would much rather go home.  Pt states she is feeling ready to leave and significantly improved since admission.  "

## 2022-02-01 NOTE — PLAN OF CARE
"  Problem: Behavioral Health Plan of Care  Goal: Adheres to Safety Considerations for Self and Others  Outcome: No Change  Goal: Optimized Coping Skills in Response to Life Stressors  Outcome: No Change     Pt presented as alert and oriented to place and self throughout shift.   She was somewhat withdrawn, however intermittently visible on the unit walking the halls and participating in OT groups.  Pt was dressed appropriately and ate meals.  She did not have schedule medications on this shift, nor did she require or request PRN medications.  Pt started cogentin 1 mg BID.  She endorsed dry/sore throat pain this morning (3/10).  Pt stated that \"she misses her baby, feels much better, and just want to go home.\"  She denied SI/HI/SIB or any psychosis.  She spent the remainder of the afternoon in her room napping.  "

## 2022-02-01 NOTE — PLAN OF CARE
Problem: Coping with Symptoms  Goal: Identify Coping Skills  Description: Patient will identify healthy coping skills to manage stress and reduce symptoms    Outcome: Improving     Group attendance today:   1    of     3  groups  Sleeping during 2nd group, and attended just a portion of the 3rd group.  Group type: tpoic/activity group  Number of participants: 2  Participation level:  engaged  Additional notes: Pt continues to do well, focused, attentive, and on topic during conversation.  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  Pt reports medications have been helpful and she wants to continue to do well.  Friendly and encouraging of others on the unit.

## 2022-02-01 NOTE — PROGRESS NOTES
02/01/22 1600   General Information   Date Initially Attended OT 01/31/22   Clinical Impression   Affect Appropriate to situation   Orientation Oriented to person, place and time   Appearance and ADLs Neatly groomed   Attention to Internal Stimuli No observed signs   Interaction Skills Interacts appropriately with staff;Initiates appropriately with staff;Interacts appropriately with peers;Initiates appropriately with peers   Ability to Communicate Needs Independent   Verbal Content Clear   Ability to Maintain Boundaries Maintains appropriate physical boundaries;Maintains appropriate verbal boundaries   Participation Independently participates   Concentration Concentrates 30+ minutes   Ability to Concentrate With structure   Follows and Comprehends Directions Independently follows multi-step directions   Memory Delayed and immediate recall intact   Organization Independently organizes medium tasks   Decision Making Independent   Planning and Problem Solving Occasionally needs assist/feedback   Ability to Apply and Learn Concepts Comprehends concepts, but needs assist to apply   Frustrations / Stress Tolerance Independently identifies sources of frustration/stress   Level of Insight Insightful into needs, issues, goals   Self Esteem Can identify positives   Social Supports Identifies utilizing supports   General Observation/Plan   General Observations/Plan See Comments

## 2022-02-01 NOTE — PLAN OF CARE
Assessment/Intervention/Current Symptoms and Care Coordination  CTC facilitated completion of FMLA paperwork, provider completed and CTC forwarded completed document to patient's employer at her and spouse request.     Discharge Plan or Goal  Patient will return home with outpatient services in place.    Barriers to Discharge   Patient needs additional time for stabilization with monitoring of meds and adjustments as appropriate.    Referral Status  Psychiatry and therapy referrals needed.    Legal Status  72 hour hold, expires 2/3/2022 @ 0001.

## 2022-02-02 PROCEDURE — 124N000002 HC R&B MH UMMC

## 2022-02-02 PROCEDURE — 250N000013 HC RX MED GY IP 250 OP 250 PS 637: Performed by: STUDENT IN AN ORGANIZED HEALTH CARE EDUCATION/TRAINING PROGRAM

## 2022-02-02 PROCEDURE — H2032 ACTIVITY THERAPY, PER 15 MIN: HCPCS

## 2022-02-02 PROCEDURE — 99233 SBSQ HOSP IP/OBS HIGH 50: CPT | Mod: GC | Performed by: PSYCHIATRY & NEUROLOGY

## 2022-02-02 PROCEDURE — G0177 OPPS/PHP; TRAIN & EDUC SERV: HCPCS

## 2022-02-02 RX ORDER — AMOXICILLIN 250 MG
1 CAPSULE ORAL DAILY
Status: DISCONTINUED | OUTPATIENT
Start: 2022-02-02 | End: 2022-02-03 | Stop reason: HOSPADM

## 2022-02-02 RX ORDER — POLYETHYLENE GLYCOL 3350 17 G/17G
17 POWDER, FOR SOLUTION ORAL DAILY PRN
Status: DISCONTINUED | OUTPATIENT
Start: 2022-02-02 | End: 2022-02-03 | Stop reason: HOSPADM

## 2022-02-02 RX ORDER — OLANZAPINE 15 MG/1
15 TABLET ORAL AT BEDTIME
Status: DISCONTINUED | OUTPATIENT
Start: 2022-02-02 | End: 2022-02-03 | Stop reason: HOSPADM

## 2022-02-02 RX ADMIN — OLANZAPINE 15 MG: 15 TABLET, FILM COATED ORAL at 20:14

## 2022-02-02 RX ADMIN — DOCUSATE SODIUM AND SENNOSIDES 1 TABLET: 8.6; 5 TABLET ORAL at 13:34

## 2022-02-02 ASSESSMENT — ACTIVITIES OF DAILY LIVING (ADL)
DRESS: SCRUBS (BEHAVIORAL HEALTH);INDEPENDENT
LAUNDRY: UNABLE TO COMPLETE
LAUNDRY: UNABLE TO COMPLETE
HYGIENE/GROOMING: INDEPENDENT
ORAL_HYGIENE: INDEPENDENT
DRESS: SCRUBS (BEHAVIORAL HEALTH)
ORAL_HYGIENE: INDEPENDENT
HYGIENE/GROOMING: INDEPENDENT

## 2022-02-02 NOTE — PLAN OF CARE
Problem: Behavioral Health Plan of Care  Goal: Optimized Coping Skills in Response to Life Stressors  Outcome: Improving     Pt presented as alert and oriented to place and self throughout shift.  She was intermittently visible in the milieu during the day, eating meals in the lounge, participating in some groups, and pacing the reynoso.  She was bright on approach and her speech was clear and coherent.  Pt was able to express her needs appropriately.  Pt was well groomed and neatly dressed.  Upon RN nursing assessment, pt did not appear to be responding to internal stimuli.  She denied SI/HI/SIB.  Pt denied any psychosis.  She did not endorse pain or discomfort, however she expressed that she has not had a BM in two days.  RN writer asked about fluid intake and pt stated that it was minimal.  Pt was given crystal light packets to encourage fluid intake.  Pt started daily Senna this shift as well as increased her dose of Zyprexa in the evenings from 10 mg to 15 mg. She did not require or request PRN medications.  She did not appear to have any side effects to scheduled medication.

## 2022-02-02 NOTE — PLAN OF CARE
Assessment/Intervention/Current Symptoms and Care Coordination  Reviewed chart and attended team meeting. [place] pt [place] [facility]; see below.      Discharge Plan or Goal  Patient will return home with outpatient services in place.     Barriers to Discharge   Patient needs additional time for stabilization with monitoring of meds and adjustments as appropriate.     Referral Status  Referred pt to [place] [facility]  Spoke to  who stated that they have about a two week wait for the program but will call pt by the end of this week to schedule an intake.      Scheduled pt with psychiatry and therapy through [facility].     Psychiatry Medication Management:   Provider: Parker Neri MD  Date/time: Monday, Feb 7th at 4:15pm and Tuesday March 8th at 3:50pm   Virtual appointment- you will be sent an email with virtual link and paperwork to be completed prior to appointment.     Therapy Follow up:  Provider: Tamika Jurado  Date/time: Tuesday, Feb 15th at 11am   Phone appointment. Therapist will call you at that time to complete phone session.     CTC to fax Discharge summary to [facility] upon discharge fax: [phone].     Legal Status  72 hour hold, expires 2/3/2022 @ 0001.

## 2022-02-02 NOTE — PROGRESS NOTES
"    ----------------------------------------------------------------------------------------------------------  [facility], [place]   Psychiatric Progress Note  Hospital Day #3     Interim History:   The patient's care was discussed with the treatment team and chart notes were reviewed.    Sleep: 7h documented   Scheduled Medications: Took all   PRN's: Tylenol at 4pm  Staff Report: Interacted appropriately in group. Calm, pleasant affect. Visible and social in milieu.     Patient Interview: Patient was interviewed in her room. She appeared notably euphoric and hyperverbal. She laughed while discussing her current constipation. Team explained that this could be a side effect of Zyprexa and explained that we would schedule a laxative that she could also use after she goes home. She was in agreement with this. She said she had \"lots of energy\" and appeared very euphoric while discussing this. When asked if it was because she had slept well, she agreed it was due to this. She also said she had \"been given this strength by believing in Vincent\" and that her Bahai beliefs give her strength and energy. When asked to elaborate, she did not make any overtly delusional statements but there was some grandiosity, stating that the strength she gets from her Confucianism is much higher than the average person. She also stated that her baby is growing very quickly, and would appear to be 5 or 6 months old rather than actual age of 2 months.     She stated she was not breastfeeding due to low milk supply. They have been using formula. She denied ever having had thoughts of harming her baby or that her baby was evil or otherwise \"different\".     She denied ever having used khat in her lifetime.     She stated the side effect of the feeling in her throat did not return. She did endorse dry mouth and dry lips. Discussed with her this could also represent a side effect of Zyprexa.     Discussed with her that " "we do recommend she stays another night so we can observe for ongoing improvement and tolerability. Discussed with her we would like to raise the dose to 15mg as this is typically the target dose for ani/hypomania. She indicated understanding and agreement. She agreed to sign in voluntarily. We emphasized the importance of ongoing full adherence to Zyprexa in the outpatient setting to prevent herself from getting sick again and discussed with her that when she is happy/healthy, her baby will be as well.     Review of systems:     ROS was negative unless noted above.          Allergies:   No Known Allergies         Psychiatric Examination:   /75   Pulse 69   Temp 98.1  F (36.7  C)   Resp 16   SpO2 98%   Weight is 0 lbs 0 oz  There is no height or weight on file to calculate BMI.    Appearance:  awake, alert, adequately groomed, dressed in hospital scrubs and appeared as age stated  Attitude:  cooperative and very pleasant  Eye Contact:  good  Mood:  \"Good!\"  Affect:  mood congruent, intensity is heightened and appears euphoric out of proportion to topics discussed  Speech:  clear, coherent, rapid at times though nonpressured  Psychomotor Behavior:  no evidence of tardive dyskinesia, dystonia, or tics  Thought Process:  logical, linear and goal oriented  Associations:  no loose associations  Thought Content:  no evidence of suicidal ideation or homicidal ideation and no evidence of psychotic thought; however, grandiose statements made as per interim history although these did not reach delusional intensity  Insight:  fair  Judgment:  fair  Oriented to:  time, person, and place  Attention Span and Concentration:  fair  Recent and Remote Memory:  intact  Language: Fluent and conversant in English.  Fund of Knowledge: Appears intact.   Muscle Strength and Tone: normal  Gait and Station: Normal         Labs:   No results found for this or any previous visit (from the past 24 hour(s)).     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      The patient had a similar episode in the immediate postpartum period in 2020 after her first baby . She improved rapidly on 5mg Abilify and she discharged within 5 days on Abilify 5mg + trazodone 50mg.   reports she stopped taking her medications as soon as she got home. Interestingly, in spite of non-adherence,  and patient both deny and known psychotic symptoms or mood episodes in between hospitalizations, prior to their onset again 5 days ago. No particular psychosocial stressor was identified apart from giving birth 2 months ago. It is possible that she did develop mood symptoms within 4 weeks of giving birth (and that this current presentation thus represents another peripartum mood episode) and that her symptoms were undetected due to cultural differences in understanding/discussions of mental illness. The other possibilities are a primary psychotic and/or mood disorder: it would be unusual for a primary thought disorder to present as two isolated episodes without residual symptoms in between episodes in spite of no treatment, and it would be unusual for bipolar disorder to only present as manic episodes that responded unusually rapidly to treatment, and without a previous history of depressive episodes. Again, cultural differences in understanding mental health may be confounding the history reported by patient and . For now, it seems the most likely diagnosis is bipolar I disorder, manic, with psychotic features.      The current presentation is consistent with psychosis/ani. Hospitalization is indicated due to safety concerns in the context of a mental illness, of which hospitalization is reasonably expected to result in improvement.    Hospital  course: Ema Rojas was admitted to station 12 on a 72 hour hold due to agitated behaviors in the emergency room. She received olanzapine and trazodone the night she was admitted. The following day on 1/31/22, she was behaviorally improved, bright affect, pleasant demeanor, and denying all mental health symptoms. She signed in as a voluntary patient. On 2/1/22, she continued to demonstrate improvement in symptoms and behaviors.     Discontinued Medications (& Rationale):      Assessment 2/2/2022:    Continues to appear hyperreligious and euphoric; however, is sleeping well, with organized thought processes, and no evidence of delusional statements or lety psychosis. Will increase olanzapine to 15mg tonight, observe for tolerability/efficacy, and if still no safety concerns tomorrow will likely discharge home.     Plan   Principal Diagnosis:   #Psychosis, unspecified (Bipolar Disorder, Type I vs postpartum psychosis vs primary psychotic illness)    Secondary psychiatric diagnoses of concern this admission:   N/A    Psychotropic Medications:  Modify:  - Increase olanzapine to 15mg at bedtime   - add Senna-doc 1 tab daily   - add Miralax PRN     Continue:  - Cogentin 1 mg BID orally and IM prn for EPSE    Patient will be treated in therapeutic milieu with appropriate individual and group therapies as described.    Medical diagnoses to be addressed this admission:  No acute medical concerns at this time    Consults: None    Legal Status: Voluntary    Safety Assessment:   Behavioral Orders   Procedures     Assault precautions     Code 1 - Restrict to Unit     Elopement precautions     Routine Programming     As clinically indicated     Status 15     Every 15 minutes.       Disposition: Anticipate discharge to home tomorrow pending ongoing clinical stabilization. Referrals have been made to [facility] mother-baby program.     Roosevelt Shane MD  PGY-4 Psychiatry Resident    Video-Visit Details (Resident physician,   Shane was on site with patient and I was using polycom)    Type of service:  Video Visit    Video Start Time (time video started): 1055    Video End Time (time video stopped): 1105    Originating Location (pt. Location): Other station 12    Distant Location (provider location): Provider remote location    Mode of Communication:  Video Conference via Polycom    Physician has received verbal consent for a Video Visit from the patient? Yes      Verito Hinkle MD

## 2022-02-02 NOTE — PLAN OF CARE
"  Problem: Coping with Symptoms  Goal: Identify Coping Skills  Description: Patient will identify healthy coping skills to manage stress and reduce symptoms    Outcome: Improving     Group attendance today:   2    of   2    groups    Group type: topic, OT clinic  Number of participants: 3, 2  Participation level: engaged  Additional notes: Pt was eager for group to begin, telling writer multiple times she was glad to see me, letting me know I am like a mother, and how much she appreciates group time.  Pt made many Restoration comments during the group check-in, statements from feeling blessed and thankful to hyper Restoration comments to peers about Vincent, the body of Jesús, Jesús saving people, and many more comments necessitating writer to very gently redirect her that Taoism/spirituality is a very personal topic, and something not discussed in the context of group topics.  Pt acknowledged she understood, though moments later provided Restoration based feedback to a peer talking about going to CD, and again needed feedback.    Regarding group topic of 3 words to describe what if feels like to be me, pt wrote:  \"Restoration, blessed\"  \"respect\"  \"knowledge\"  Pt spoke on topic sharing that she is Restoration, respects everyone's differences, and likes to learn from everyone's knowledge.    At one point again, began to give tangential feedback about the birth of Vincent though accepted redirection.    Pt was excited for OT clinic, made a beaded necklace for herself - wanting to make it exactly like the sample.    "

## 2022-02-02 NOTE — PLAN OF CARE
"Pt was seen out in milieu interacting with staff, peers and coloring or reading a book. Upon approach, she is calm and cooperative. She attended the group today. he is medication compliant, she did receive PRN Tylenol this evening at 1530 for a headache. She later reported relief from that. She video called her  and baby for 15 minutes on her cell phone without any issues. She denies SI/SIB/HI and all other psychotic symptoms stating \"I am perfectly fine, I sleep good, I feel good I am ready to go home and see my  and baby\". She appears adequate for discharge however it is hard to tell if she is hiding symptoms of psychosis by being hyper-focused on going home. No medical concerns at this time. Continue to monitor to assess.   Problem: Adult Inpatient Plan of Care  Goal: Plan of Care Review  Outcome: No Change  Flowsheets  Taken 2/1/2022 1808  Plan of Care Reviewed With: patient  Taken 2/1/2022 1614  Plan of Care Reviewed With: patient     "

## 2022-02-02 NOTE — PLAN OF CARE
"  Problem: General Rehab Plan of Care  Goal: Therapeutic Recreation/Music Therapy Goal  Description: The patient and/or their representative will achieve their patient-specific goals related to the plan of care.  The patient-specific goals include:  Outcome: No Change     Ema attended art therapy group for 45 minutes. She reported feeling \"good\" today and presented with a calm, pleasant affect. She participated in the mindfulness activities to \"draw your breath\" and \"draw to music.\" She engaged in group conversation about how focusing on your senses can change how you feel. She reported feeling relaxed. She shared her drawing with the group and talked about how the song reminded her of \"unity\" between different groups. She interacted appropriately with peers and was a positive participant.   "

## 2022-02-02 NOTE — PLAN OF CARE
Pt asleep  at start of shift. Breathing quiet and unlabored.     Pt requested and was given a snack.     Pt had no c/o pain or discomfort during the HS.     Appears to have slept 6.25 hours.     Pt on  ASSAULT and ELOPEMENT precautions in addition to single room order. Any related events noted above.     Will continue to monitor and assess.   Problem: Adult Inpatient Plan of Care  Goal: Absence of Hospital-Acquired Illness or Injury  Intervention: Identify and Manage Fall Risk  Recent Flowsheet Documentation  Taken 2/2/2022 0000 by Deann Luciano, RN  Safety Promotion/Fall Prevention: nonskid shoes/slippers when out of bed     Problem: Sleep Disturbance  Goal: Adequate Sleep/Rest  Outcome: No Change

## 2022-02-03 VITALS
DIASTOLIC BLOOD PRESSURE: 84 MMHG | OXYGEN SATURATION: 100 % | RESPIRATION RATE: 16 BRPM | TEMPERATURE: 98.1 F | SYSTOLIC BLOOD PRESSURE: 126 MMHG | HEART RATE: 70 BPM

## 2022-02-03 PROCEDURE — 250N000013 HC RX MED GY IP 250 OP 250 PS 637: Performed by: STUDENT IN AN ORGANIZED HEALTH CARE EDUCATION/TRAINING PROGRAM

## 2022-02-03 PROCEDURE — 99239 HOSP IP/OBS DSCHRG MGMT >30: CPT | Mod: GC | Performed by: PSYCHIATRY & NEUROLOGY

## 2022-02-03 RX ORDER — TRAZODONE HYDROCHLORIDE 50 MG/1
50 TABLET, FILM COATED ORAL
Qty: 30 TABLET | Refills: 0 | Status: SHIPPED | OUTPATIENT
Start: 2022-02-03 | End: 2022-09-19

## 2022-02-03 RX ORDER — AMOXICILLIN 250 MG
1 CAPSULE ORAL DAILY
Qty: 30 TABLET | Refills: 0 | Status: SHIPPED | OUTPATIENT
Start: 2022-02-03 | End: 2022-09-19

## 2022-02-03 RX ORDER — POLYETHYLENE GLYCOL 3350 17 G/17G
17 POWDER, FOR SOLUTION ORAL DAILY
Qty: 510 G | Refills: 0 | Status: SHIPPED | OUTPATIENT
Start: 2022-02-03 | End: 2022-09-19

## 2022-02-03 RX ORDER — OLANZAPINE 15 MG/1
15 TABLET ORAL AT BEDTIME
Qty: 30 TABLET | Refills: 0 | Status: SHIPPED | OUTPATIENT
Start: 2022-02-03 | End: 2022-09-19

## 2022-02-03 RX ADMIN — DOCUSATE SODIUM AND SENNOSIDES 1 TABLET: 8.6; 5 TABLET ORAL at 08:34

## 2022-02-03 ASSESSMENT — ACTIVITIES OF DAILY LIVING (ADL)
ORAL_HYGIENE: INDEPENDENT
DRESS: SCRUBS (BEHAVIORAL HEALTH);INDEPENDENT
HYGIENE/GROOMING: INDEPENDENT
LAUNDRY: UNABLE TO COMPLETE
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
## 2022-02-03 NOTE — PLAN OF CARE
Problem: Adult Inpatient Plan of Care  Goal: Readiness for Transition of Care  Outcome: Adequate for Discharge     Pt presented as alert and oriented to place and self throughout the morning.  She was visible in the milieu during breakfast and was social with some staff and peers.  She was dressed appropriately and adequately groomed.  Pt's speech was clear and coherent and her gait was steady and balanced.  She was able to express her needs appropriately.  Pt was medication compliant and did not require or request PRNs.  Pt did not exhibit side effects to medications.  Upon nursing assessment, pt did not endorse pain or acute physical health concerns.  She states no thoughts of SI/HI/SB or psychosis.  Pt did not endorse AH/VH.  Pt is anxiously awaiting discharge today.

## 2022-02-03 NOTE — DISCHARGE INSTRUCTIONS
Behavioral Discharge Planning and Instructions    Summary: You were admitted on 1/29/2022  due to Psychotic Symptomology.  You were treated by Doctor Verito Hinkle and discharged on 2/3/22 from [facility] to Home.    Main Diagnosis:   Psychosis, unspecified (Bipolar Disorder, Type I vs postpartum psychosis vs primary psychotic illness)    Health Care Follow-up:   [place] [facility] will call you to schedule an intake by the end of this week.  There is about a two week wait list for their program. If you do not hear form someone by the end of the week, please call [phone].   Mother-Baby Program at the [facility] [place] [address]    [facility]  2nd Tuesday of the Month, 9:00AM - 10:00AM  For questions or registration, call [facility] [phone]    Psychiatry Medication Management:   Provider: Parker Neri MD at [facility]  Date/time: Monday, Feb 7th at 4:15pm and Tuesday March 8th at 3:50pm   Virtual appointment- you will be sent an email you at [email or web address] with virtual link and paperwork to be completed prior to appointment.   Contact [facility] at: [phone]    Therapy Follow up:  Provider: Tamika Jurado at [facility]  Date/time: Tuesday, Feb 15th at 11:00am   Phone appointment. Therapist will call you at [phone] at that time to complete phone session.   Contact [facility] at: [phone]    Attend all scheduled appointments with your outpatient providers. Call at least 24 hours in advance if you need to reschedule an appointment to ensure continued access to your outpatient providers.     Major Treatments, Procedures and Findings:  You were provided with: a psychiatric assessment, assessed for medical stability, medication evaluation and/or management and milieu management    Symptoms to  "Report: feeling more aggressive, increased confusion, losing more sleep, mood getting worse or thoughts of suicide    Early warning signs can include: increased depression or anxiety sleep disturbances increased thoughts or behaviors of suicide or self-harm  increased unusual thinking, such as paranoia or hearing voices    Safety and Wellness:  Take all medicines as directed.  Make no changes unless your doctor suggests them.      Follow treatment recommendations.  Refrain from alcohol and non-prescribed drugs.  If there is a concern for safety, call 911.    Resources:   Crisis Intervention: 462.205.3379 or 134-233-6886 (TTY: 447.461.8938).  Call anytime for help.  National [employer] on Mental Illness (www.[place].sonya.org): 678.916.8575 or 232-839-3800.  Suicide Awareness Voices of Education (SAVE) (www.save.org): 476-523-GRPE (4516)  National Suicide Prevention Line (www.mentalhealthmn.org): 372-267-LQVB (9234)  Mental Health Consumer/Survivor Network of [place] (www.mhcsn.net): 897.101.8844 or 876-560-9992  Mental Health Association of [place] (www.mentalhealth.org): 111.418.9966 or 278-138-6504  [facility] Crisis (COPE) Response - Adult 343 379-2877  Text 4 Life: txt \"LIFE\" to 67241 for immediate support and crisis intervention  Crisis text line: Text \"[place]\" to 889612. Free, confidential, 24/7.  Crisis Intervention: 511.462.9302 or 281-444-5609. Call anytime for help.     General Medication Instructions:   See your medication sheet(s) for instructions.   Take all medicines as directed.  Make no changes unless your doctor suggests them.   Go to all your doctor visits.  Be sure to have all your required lab tests. This way, your medicines can be refilled on time.  Do not use any drugs not prescribed by your doctor.  Avoid alcohol.    Advance Directives:   Scanned document on file with [facility]? No scanned doc  Is document scanned? Pt states no documents  Honoring Choices Your Rights Handout: Informed and given  Was more " information offered? Materials given    The Treatment team has appreciated the opportunity to work with you. If you have any questions or concerns about your recent admission, you can contact the unit which can receive your call 24 hours a day, 7 days a week. They will be able to get in touch with a Provider if needed. The unit number is [phone].

## 2022-02-03 NOTE — PLAN OF CARE
Assessment/Intervention/Current Symptoms and Care Coordination  -Reviewed chart and attended team meeting.   -Provided pt with a copy of the FMLA paperwork  -Provided pt with resource for support group for mothers via [facility], in addition to having an intake scheduled with the [facility] mother and baby program. Also scheduled follow up psychiatry and therapy with [facility]   -Pt's  picking her up and has been made aware of after care plan for patient     Discharge Plan or Goal  Patient returning home with outpatient services in place.     Barriers to Discharge   None; patient discharging today     Referral Status  Referred pt to mother and baby program at [facility].  Spoke to  who stated that they have about a two week wait for the program but will call pt by the end of this week to schedule an intake.      Scheduled pt with psychiatry and therapy through [facility].     Psychiatry Medication Management:   Provider: Parker Neri MD  Date/time: Monday, Feb 7th at 4:15pm and Tuesday March 8th at 3:50pm   Virtual appointment- you will be sent an email with virtual link and paperwork to be completed prior to appointment.     Therapy Follow up:  Provider: Tamika Jurado  Date/time: Tuesday, Feb 15th at 11am   Phone appointment. Therapist will call you at that time to complete phone session.     [facility] faxed Discharge summary to [facility] ([phone]).     Legal Status  Voluntary

## 2022-02-03 NOTE — PLAN OF CARE
Problem: General Rehab Plan of Care  Goal: Therapeutic Recreation/Music Therapy Goal (Art Therapy)  Description: The patient and/or their representative will achieve their patient-specific goals related to the plan of care.  The patient-specific goals include:  Outcome: Improving   Pt attended one hour of Art Therapy group this evening.  Art Therapy directive was to create two pieces of art  representing 1) what pt is reaching for/goals 2) what pt is working to change/let go of.  Goals of directive: to identify personal strengths and goals, emotional expression, emotional regulation, trauma containment, to assess pt motivation for change.  Pt was an active participant, engaged in drawing for the full duration of group.  Pt finished drawings and briefly shared with author and group.  Pt created imagery to symbolize various things she is currently working on.   Pt talked about her goals of better nutrition and to limit her daily caffeine intake. Pt also talked about focusing on positive relationships/family.  Pts mood was calm, pleasant participant.

## 2022-02-03 NOTE — PLAN OF CARE
Problem: Psychotic Symptoms  Goal: Psychotic Symptoms  Description: Signs and symptoms of listed problems will be absent or manageable.  Outcome: Improving    Pt was calm and pleasant this evening. She is fully alert and oriented. She spent most of this evening in the lounge area, socializing with peers and staff. Her affect was bright and friendly. She denied any pain or discomfort. She denied all mental health symptoms. She took scheduled medication without any issue and denied the SE's of medication. Pt is discharging tomorrow morning. No SI/SIB.

## 2022-02-03 NOTE — PLAN OF CARE
Problem: Sleep Disturbance  Goal: Adequate Sleep/Rest  Outcome: Adequate for Discharge    Patient appeared to sleep 7 hours this night shift.  No prns or snacks given or requested.  No concerns were reported or noted.  Probable discharge this morning.  Patient asking about plan for discharge and what time to expect discharge.  Told patient unsure of what time.  She remained calm, cooperative, patient, and understanding.

## 2022-02-03 NOTE — PROGRESS NOTES
Pt was discharged off unit by psych associate at 1210 and transported to home via .  Upon discharge, pt self ambulated, was alert and oriented x4, and had no S/S of psychosis .  Prior to departure, RN writer reviewed AVS, medications and their schedule, as well as follow-up appointments.  Pt left with all medications and all personal belongings.  Pt also discharged with a MD signed copy of FMLA forms that she requested for returning to work.

## 2022-06-16 NOTE — PROGRESS NOTES
Since unable to reach pt by phone and have not received a call back, sent pt a [employer] message with Dr. Ku's suggestions below.    Chani Santos RN     Ivermectin Pregnancy And Lactation Text: This medication is Pregnancy Category C and it isn't known if it is safe during pregnancy. It is also excreted in breast milk.

## 2022-09-19 ENCOUNTER — OFFICE VISIT (OUTPATIENT)
Dept: FAMILY MEDICINE | Facility: CLINIC | Age: 32
End: 2022-09-19
Payer: COMMERCIAL

## 2022-09-19 VITALS
OXYGEN SATURATION: 97 % | SYSTOLIC BLOOD PRESSURE: 126 MMHG | TEMPERATURE: 98.1 F | DIASTOLIC BLOOD PRESSURE: 87 MMHG | BODY MASS INDEX: 26.42 KG/M2 | HEART RATE: 104 BPM | WEIGHT: 122.1 LBS | RESPIRATION RATE: 16 BRPM

## 2022-09-19 DIAGNOSIS — R51.9 NONINTRACTABLE EPISODIC HEADACHE, UNSPECIFIED HEADACHE TYPE: ICD-10-CM

## 2022-09-19 DIAGNOSIS — G47.00 INSOMNIA, UNSPECIFIED TYPE: Primary | ICD-10-CM

## 2022-09-19 PROCEDURE — 99214 OFFICE O/P EST MOD 30 MIN: CPT | Performed by: FAMILY MEDICINE

## 2022-09-19 RX ORDER — TRAZODONE HYDROCHLORIDE 50 MG/1
25-50 TABLET, FILM COATED ORAL AT BEDTIME
Qty: 30 TABLET | Refills: 0 | Status: SHIPPED | OUTPATIENT
Start: 2022-09-19 | End: 2022-09-22

## 2022-09-19 NOTE — PATIENT INSTRUCTIONS
I will have them call you to help set up a follow up appointment    The sleep/general counseling people also will call to help set up an appt.     Chris Heath MD

## 2022-09-19 NOTE — Clinical Note
This is a patient that needs follow up. Can you help schedule something? She would be new. Her child also needs a new primary as hers retired. Thanks. Chris Heath MD

## 2022-09-20 NOTE — PROGRESS NOTES
Assessment & Plan     Insomnia, unspecified type  At risk for recurrence of psychosis. Will start trazadone and have her follow up with int hte next couple of days. Discussed ER if neede.d  - Adult Mental Health  Referral  - traZODone (DESYREL) 50 MG tablet  Dispense: 30 tablet; Refill: 0    Nonintractable episodic headache, unspecified headache type  May be migrainee headaches or tension. No alarm features.       [id number]}    Return in about 2 days (around 9/21/2022) for follow up appointment.    Chris Heath MD  [facility]    ------------------------------------------------------------------------  Subjective     Ema Rojas presents to clinic today for the following health issues:  chief complaint  HPI  She reports her period to have been a couple days late and she developed a headache after having some burning sensation in her neck and left arm. The headache and the burning have since resolved. No fever nor other neuro symptoms. No associated phtophobia nor nausea with the headaches.     She has not slept well the past couple of days. No irregular thinking per her .     The patient has a history of psychosis twice in the past over the past year. the most recent of which she was hospitalized.     She is seen with her  who is concerned of a recurrence of the past events when she did not sleep well. She had no follow up after the hosp and did not stay on the meds prescribed.     Review of Systems  No other const, neuro, msk, psych symptoms. No particulary stresses.       Objective    /87 (BP Location: Left arm, Patient Position: Sitting, Cuff Size: Adult Regular)   Pulse 104   Temp 98.1  F (36.7  C) (Oral)   Resp 16   Wt 55.4 kg (122 lb 1.6 oz)   LMP 09/17/2022 (Exact Date)   SpO2 97%   Breastfeeding No   BMI 26.42 kg/m    Physical Exam   GENERAL: healthy, alert and no distress  MS: no gross musculoskeletal defects noted, no edema  NEURO: symmetric  PSYCH: mentation  appears normal, affect normal/bright.

## 2022-09-21 ENCOUNTER — HOSPITAL ENCOUNTER (EMERGENCY)
Facility: CLINIC | Age: 32
Discharge: HOME | End: 2022-09-21
Attending: EMERGENCY MEDICINE | Admitting: EMERGENCY MEDICINE
Payer: COMMERCIAL

## 2022-09-21 ENCOUNTER — NURSE TRIAGE (OUTPATIENT)
Dept: NURSING | Facility: CLINIC | Age: 32
End: 2022-09-21

## 2022-09-21 ENCOUNTER — TELEPHONE (OUTPATIENT)
Dept: BEHAVIORAL HEALTH | Facility: CLINIC | Age: 32
End: 2022-09-21

## 2022-09-21 ENCOUNTER — HOSPITAL ENCOUNTER (INPATIENT)
Facility: CLINIC | Age: 32
LOS: 6 days | Discharge: HOME | End: 2022-09-30
Attending: EMERGENCY MEDICINE | Admitting: PSYCHIATRY & NEUROLOGY
Payer: COMMERCIAL

## 2022-09-21 VITALS
OXYGEN SATURATION: 99 % | SYSTOLIC BLOOD PRESSURE: 111 MMHG | DIASTOLIC BLOOD PRESSURE: 70 MMHG | WEIGHT: 124.2 LBS | RESPIRATION RATE: 16 BRPM | TEMPERATURE: 97.9 F | BODY MASS INDEX: 26.88 KG/M2 | HEART RATE: 116 BPM

## 2022-09-21 DIAGNOSIS — Z20.822 LAB TEST NEGATIVE FOR COVID-19 VIRUS: ICD-10-CM

## 2022-09-21 DIAGNOSIS — M25.522 PAIN IN JOINT, UPPER ARM, LEFT: ICD-10-CM

## 2022-09-21 DIAGNOSIS — G47.00 INSOMNIA, UNSPECIFIED TYPE: ICD-10-CM

## 2022-09-21 DIAGNOSIS — F31.2 BIPOLAR AFFECTIVE DISORDER, CURRENTLY MANIC, SEVERE, WITH PSYCHOTIC FEATURES (H): ICD-10-CM

## 2022-09-21 DIAGNOSIS — F29 PSYCHOSIS, UNSPECIFIED PSYCHOSIS TYPE (H): ICD-10-CM

## 2022-09-21 LAB
ANION GAP SERPL CALCULATED.3IONS-SCNC: 7 MMOL/L (ref 3–14)
BASOPHILS # BLD AUTO: 0 10E3/UL (ref 0–0.2)
BASOPHILS NFR BLD AUTO: 0 %
BUN SERPL-MCNC: 6 MG/DL (ref 7–30)
CALCIUM SERPL-MCNC: 8.8 MG/DL (ref 8.5–10.1)
CHLORIDE BLD-SCNC: 107 MMOL/L (ref 94–109)
CO2 SERPL-SCNC: 26 MMOL/L (ref 20–32)
CREAT SERPL-MCNC: 0.49 MG/DL (ref 0.52–1.04)
EOSINOPHIL # BLD AUTO: 0.1 10E3/UL (ref 0–0.7)
EOSINOPHIL NFR BLD AUTO: 1 %
ERYTHROCYTE [DISTWIDTH] IN BLOOD BY AUTOMATED COUNT: 14 % (ref 10–15)
GFR SERPL CREATININE-BSD FRML MDRD: >90 ML/MIN/1.73M2
GLUCOSE BLD-MCNC: 104 MG/DL (ref 70–99)
HCT VFR BLD AUTO: 38.9 % (ref 35–47)
HGB BLD-MCNC: 12.9 G/DL (ref 11.7–15.7)
IMM GRANULOCYTES # BLD: 0 10E3/UL
IMM GRANULOCYTES NFR BLD: 0 %
LYMPHOCYTES # BLD AUTO: 2.3 10E3/UL (ref 0.8–5.3)
LYMPHOCYTES NFR BLD AUTO: 30 %
MCH RBC QN AUTO: 27.9 PG (ref 26.5–33)
MCHC RBC AUTO-ENTMCNC: 33.2 G/DL (ref 31.5–36.5)
MCV RBC AUTO: 84 FL (ref 78–100)
MONOCYTES # BLD AUTO: 0.5 10E3/UL (ref 0–1.3)
MONOCYTES NFR BLD AUTO: 7 %
NEUTROPHILS # BLD AUTO: 4.6 10E3/UL (ref 1.6–8.3)
NEUTROPHILS NFR BLD AUTO: 62 %
NRBC # BLD AUTO: 0 10E3/UL
NRBC BLD AUTO-RTO: 0 /100
PLATELET # BLD AUTO: 220 10E3/UL (ref 150–450)
POTASSIUM BLD-SCNC: 3.2 MMOL/L (ref 3.4–5.3)
RBC # BLD AUTO: 4.63 10E6/UL (ref 3.8–5.2)
SARS-COV-2 RNA RESP QL NAA+PROBE: NEGATIVE
SODIUM SERPL-SCNC: 140 MMOL/L (ref 133–144)
TSH SERPL DL<=0.005 MIU/L-ACNC: 0.42 MU/L (ref 0.4–4)
WBC # BLD AUTO: 7.5 10E3/UL (ref 4–11)

## 2022-09-21 PROCEDURE — 99285 EMERGENCY DEPT VISIT HI MDM: CPT | Performed by: EMERGENCY MEDICINE

## 2022-09-21 PROCEDURE — 99284 EMERGENCY DEPT VISIT MOD MDM: CPT | Performed by: EMERGENCY MEDICINE

## 2022-09-21 PROCEDURE — 250N000013 HC RX MED GY IP 250 OP 250 PS 637: Performed by: EMERGENCY MEDICINE

## 2022-09-21 PROCEDURE — 250N000011 HC RX IP 250 OP 636: Performed by: EMERGENCY MEDICINE

## 2022-09-21 PROCEDURE — 82310 ASSAY OF CALCIUM: CPT | Performed by: EMERGENCY MEDICINE

## 2022-09-21 PROCEDURE — 84443 ASSAY THYROID STIM HORMONE: CPT | Performed by: EMERGENCY MEDICINE

## 2022-09-21 PROCEDURE — 96372 THER/PROPH/DIAG INJ SC/IM: CPT | Performed by: EMERGENCY MEDICINE

## 2022-09-21 PROCEDURE — 36415 COLL VENOUS BLD VENIPUNCTURE: CPT | Performed by: EMERGENCY MEDICINE

## 2022-09-21 PROCEDURE — C9803 HOPD COVID-19 SPEC COLLECT: HCPCS | Performed by: EMERGENCY MEDICINE

## 2022-09-21 PROCEDURE — 85025 COMPLETE CBC W/AUTO DIFF WBC: CPT | Performed by: EMERGENCY MEDICINE

## 2022-09-21 PROCEDURE — 90791 PSYCH DIAGNOSTIC EVALUATION: CPT

## 2022-09-21 PROCEDURE — U0005 INFEC AGEN DETEC AMPLI PROBE: HCPCS | Performed by: EMERGENCY MEDICINE

## 2022-09-21 PROCEDURE — 99285 EMERGENCY DEPT VISIT HI MDM: CPT | Mod: 25 | Performed by: EMERGENCY MEDICINE

## 2022-09-21 RX ORDER — QUETIAPINE FUMARATE 50 MG/1
50 TABLET, FILM COATED ORAL ONCE
Status: COMPLETED | OUTPATIENT
Start: 2022-09-21 | End: 2022-09-21

## 2022-09-21 RX ORDER — OLANZAPINE 10 MG/2ML
10 INJECTION, POWDER, FOR SOLUTION INTRAMUSCULAR ONCE
Status: COMPLETED | OUTPATIENT
Start: 2022-09-21 | End: 2022-09-21

## 2022-09-21 RX ORDER — IBUPROFEN 600 MG/1
600 TABLET, FILM COATED ORAL
COMMUNITY
Start: 2021-11-22 | End: 2022-09-22

## 2022-09-21 RX ORDER — QUETIAPINE FUMARATE 50 MG/1
50 TABLET, FILM COATED ORAL AT BEDTIME
Qty: 30 TABLET | Refills: 0 | Status: SHIPPED | OUTPATIENT
Start: 2022-09-21 | End: 2022-09-22

## 2022-09-21 RX ORDER — MULTIVITAMIN,THERAPEUTIC
1 TABLET ORAL DAILY
COMMUNITY
End: 2022-09-22

## 2022-09-21 RX ORDER — NAPROXEN 500 MG/1
500 TABLET ORAL 2 TIMES DAILY WITH MEALS
Qty: 30 TABLET | Refills: 0 | Status: SHIPPED | OUTPATIENT
Start: 2022-09-21 | End: 2022-09-22

## 2022-09-21 RX ORDER — ACETAMINOPHEN 325 MG/1
975 TABLET ORAL ONCE
Status: COMPLETED | OUTPATIENT
Start: 2022-09-21 | End: 2022-09-21

## 2022-09-21 RX ORDER — IBUPROFEN 600 MG/1
600 TABLET, FILM COATED ORAL ONCE
Status: COMPLETED | OUTPATIENT
Start: 2022-09-21 | End: 2022-09-21

## 2022-09-21 RX ADMIN — OLANZAPINE 10 MG: 10 INJECTION, POWDER, LYOPHILIZED, FOR SOLUTION INTRAMUSCULAR at 19:50

## 2022-09-21 RX ADMIN — ACETAMINOPHEN 975 MG: 325 TABLET, FILM COATED ORAL at 02:38

## 2022-09-21 RX ADMIN — IBUPROFEN 600 MG: 600 TABLET ORAL at 02:38

## 2022-09-21 RX ADMIN — QUETIAPINE FUMARATE 50 MG: 50 TABLET ORAL at 02:38

## 2022-09-21 ASSESSMENT — ACTIVITIES OF DAILY LIVING (ADL)
ADLS_ACUITY_SCORE: 37
ADLS_ACUITY_SCORE: 35
ADLS_ACUITY_SCORE: 35

## 2022-09-21 ASSESSMENT — ENCOUNTER SYMPTOMS
SHORTNESS OF BREATH: 0
HYPERACTIVE: 1
ABDOMINAL PAIN: 0
SLEEP DISTURBANCE: 1
FEVER: 0

## 2022-09-21 NOTE — ED PROVIDER NOTES
ED Provider Note  [facility]      History     Chief Complaint   Patient presents with     Insomnia     HPI  Ema Roajs is a 32 year old female who presents to us with a chief complaint of insomnia and left arm pain.  Describes her pain rating down her left arm that is a burning sensation.  No known injury.  Patient took 1 single 200 mg ibuprofen tablet which did not help with the pain.  No rash.  She also notes difficulty sleeping for the last several days.  Per chart review patient has a history of psychosis that exhibited initially with inability to sleep.  She was admitted and then when she was stabilized and discharged she did not continue any medication outpatient.  She is not currently suicidal or homicidal.  She and her  do not feel she needs to see a mental health  at this time.    Past Medical History  Past Medical History:   Diagnosis Date     Hyperthyroidism     she has seen endocrinology 03/2021     Iron deficiency anemia secondary to inadequate dietary iron intake 6/15/2020     Prior pregnancy with fetal demise 07/2020     Past Surgical History:   Procedure Laterality Date     NO HISTORY OF SURGERY       ibuprofen (ADVIL/MOTRIN) 600 MG tablet  multivitamin, therapeutic (THERA-VIT) TABS tablet  naproxen (NAPROSYN) 500 MG tablet  QUEtiapine (SEROQUEL) 50 MG tablet  traZODone (DESYREL) 50 MG tablet      No Known Allergies  Family History  Family History   Problem Relation Age of Onset     No Known Problems Mother      No Known Problems Father      No Known Problems Maternal Grandmother      No Known Problems Maternal Grandfather      No Known Problems Paternal Grandmother      No Known Problems Paternal Grandfather      No Known Problems Brother      No Known Problems Sister      No Known Problems Brother      No Known Problems Sister      No Known Problems Sister      No Known Problems Sister      Social History   Social History     Tobacco Use     Smoking  status: Never Smoker     Smokeless tobacco: Never Used   Substance Use Topics     Alcohol use: Never     Drug use: Never      Past medical history, past surgical history, medications, allergies, family history, and social history were reviewed with the patient. No additional pertinent items.       Review of Systems   Constitutional: Negative for fever.   Respiratory: Negative for shortness of breath.    Cardiovascular: Negative for chest pain.   Gastrointestinal: Negative for abdominal pain.   All other systems reviewed and are negative.    A complete review of systems was performed with pertinent positives and negatives noted in the HPI, and all other systems negative.    Physical Exam   BP: 111/70  Pulse: 116  Temp: 97.9  F (36.6  C)  Resp: 16  Weight: 56.3 kg (124 lb 3.2 oz)  SpO2: 99 %  Physical Exam  Vitals and nursing note reviewed.   Constitutional:       General: She is not in acute distress.     Appearance: She is well-developed. She is not ill-appearing.   HENT:      Head: Normocephalic and atraumatic.      Right Ear: External ear normal.      Left Ear: External ear normal.      Nose: Nose normal.   Eyes:      Extraocular Movements: Extraocular movements intact.      Conjunctiva/sclera: Conjunctivae normal.   Pulmonary:      Effort: Pulmonary effort is normal. No respiratory distress.   Abdominal:      General: There is no distension.   Musculoskeletal:         General: No swelling, tenderness or deformity.      Cervical back: Normal range of motion and neck supple.      Comments: Normal strength bilateral arms.  No rash present on the left arm.  No specific tenderness.   Skin:     General: Skin is warm and dry.   Neurological:      Mental Status: Mental status is at baseline.      Comments: Alert, oriented   Psychiatric:         Mood and Affect: Mood normal.         Behavior: Behavior normal.         ED Course      Procedures              No results found for any visits on 09/21/22.  Medications    QUEtiapine (SEROquel) tablet 50 mg (has no administration in time range)   ibuprofen (ADVIL/MOTRIN) tablet 600 mg (has no administration in time range)   acetaminophen (TYLENOL) tablet 975 mg (has no administration in time range)        Assessments & Plan (with Medical Decision Making)   32-year-old female presents to us with a chief complaint of arm pain and insomnia.  She has not been regularly taking medication for either.  We will give her prescription for Seroquel in addition to naproxen.  She will follow-up with primary care and return to us as needed.    I have reviewed the nursing notes. I have reviewed the findings, diagnosis, plan and need for follow up with the patient.    New Prescriptions    NAPROXEN (NAPROSYN) 500 MG TABLET    Take 1 tablet (500 mg) by mouth 2 times daily (with meals) for 15 days    QUETIAPINE (SEROQUEL) 50 MG TABLET    Take 1 tablet (50 mg) by mouth At Bedtime       Final diagnoses:   Pain in joint, upper arm, left   Insomnia, unspecified type       --  Roosevelt Rodriguez  [facility] EMERGENCY DEPARTMENT  9/21/2022     Roosevelt Rodriguez,   09/21/22 022

## 2022-09-21 NOTE — ED TRIAGE NOTES
Has not slept for 4 days.  Taking Trazodone not helping.  C/O left side of head , neck and left arm burning sensation.   Triage Assessment     Row Name 09/21/22 0149       Triage Assessment (Adult)    Airway WDL WDL       Respiratory WDL    Respiratory WDL WDL       Skin Circulation/Temperature WDL    Skin Circulation/Temperature WDL WDL       Cardiac WDL    Cardiac WDL WDL       Peripheral/Neurovascular WDL    Peripheral Neurovascular WDL WDL       Cognitive/Neuro/Behavioral WDL    Cognitive/Neuro/Behavioral WDL WDL

## 2022-09-21 NOTE — TELEPHONE ENCOUNTER
[facility]al[facility]ean[facility] is calling and FNA states that there is no consent to communicate signed and will need permission from Ebise to let FNA speak with .  Ebise did not give consent.  No triage.      Reason for Disposition    Information only question and nurse able to answer    Additional Information    Negative: Nursing judgment    Negative: Nursing judgment    Negative: Nursing judgment    Negative: Nursing judgment    Protocols used: INFORMATION ONLY CALL - NO TRIAGE-A-OH

## 2022-09-21 NOTE — LETTER
September 24, 2022      To Whom It May Concern:    Juana Chester's wife is currently an inpatient at [facility] (starting date 9/21/2022).  The duration of her hospitalization is unknown at this time.  Please excuse him from work during her hospitalization.     Sincerely,        Jessa Barclay MD

## 2022-09-21 NOTE — ED PROVIDER NOTES
"ED Provider Note  [facility]      History     Chief Complaint   Patient presents with     Mental Health Problem     was with  screaming outside today. S/O started 2 years ago when 9 month ago . H/O Bipolar not taking meds     HPI  Ema Rojas is a 32 year old female with a PMH of hyperthyroidism, bipolar disorder and psychosis who presents to the ED today reporting mental health problem.  Patient denies suicidal ideation.  She does not want to hurt herself or anyone else.  She denies current medical complaints.  She states she has not been taking her medications but she is not willing to take them.  She is willing to be admitted to psychiatry.  Patient's  is not present in the emergency department and states that she has not been taking her medications.  He says that she does not believe she has a mental illness.  She has not been sleeping.    Per [facility]: Today in the ED patient appears manic, psychotic and religiously preoccupied.  She has not been sleeping for the past 5 days.  She has been watching the [employer] channel on TV and is talking about \"the devil's work\".  Here in the ED she has been breaking out in hysterical laughter.  She believes that her father is in the moon and is talking to her from heaven.  She has 2 prior admissions for psychosis related to bipolar disorder.  She states that she is on her medications and that her  gives them to her.  She is agreeable to admission.    Past Medical History  Past Medical History:   Diagnosis Date     Hyperthyroidism     she has seen endocrinology 2021     Iron deficiency anemia secondary to inadequate dietary iron intake 6/15/2020     Prior pregnancy with fetal demise 2020     Past Surgical History:   Procedure Laterality Date     NO HISTORY OF SURGERY       ibuprofen (ADVIL/MOTRIN) 600 MG tablet  multivitamin, therapeutic (THERA-VIT) TABS tablet  naproxen (NAPROSYN) 500 MG tablet  QUEtiapine (SEROQUEL) " 50 MG tablet  traZODone (DESYREL) 50 MG tablet      No Known Allergies  Family History  Family History   Problem Relation Age of Onset     No Known Problems Mother      No Known Problems Father      No Known Problems Maternal Grandmother      No Known Problems Maternal Grandfather      No Known Problems Paternal Grandmother      No Known Problems Paternal Grandfather      No Known Problems Brother      No Known Problems Sister      No Known Problems Brother      No Known Problems Sister      No Known Problems Sister      No Known Problems Sister      Social History   Social History     Tobacco Use     Smoking status: Never Smoker     Smokeless tobacco: Never Used   Substance Use Topics     Alcohol use: Never     Drug use: Never      Past medical history, past surgical history, medications, allergies, family history, and social history were reviewed with the patient. No additional pertinent items.       Review of Systems   Unable to perform ROS: Psychiatric disorder   Psychiatric/Behavioral: Positive for behavioral problems and sleep disturbance. The patient is hyperactive.      A complete review of systems was performed with pertinent positives and negatives noted in the HPI, and all other systems negative.    Physical Exam      Physical Exam  Vitals and nursing note reviewed.   Constitutional:       General: She is in acute distress.      Appearance: Normal appearance. She is well-developed and normal weight. She is not ill-appearing or diaphoretic.   HENT:      Head: Normocephalic and atraumatic.      Nose: Nose normal.      Mouth/Throat:      Mouth: Mucous membranes are moist.   Eyes:      General: No scleral icterus.     Extraocular Movements: Extraocular movements intact.      Conjunctiva/sclera: Conjunctivae normal.   Cardiovascular:      Rate and Rhythm: Normal rate.   Pulmonary:      Effort: Pulmonary effort is normal. No respiratory distress.      Breath sounds: No stridor.   Abdominal:      General: There  is no distension.   Musculoskeletal:         General: No deformity or signs of injury. Normal range of motion.      Cervical back: Normal range of motion and neck supple. No rigidity.   Skin:     General: Skin is warm and dry.      Coloration: Skin is not jaundiced or pale.   Neurological:      General: No focal deficit present.      Mental Status: She is alert and oriented to person, place, and time.   Psychiatric:         Attention and Perception: She is inattentive.         Mood and Affect: Mood is anxious. Affect is labile.         Behavior: Behavior is agitated. Behavior is cooperative.         Thought Content: Thought content does not include homicidal or suicidal ideation.         Judgment: Judgment is impulsive and inappropriate.      Comments: On arrival, the patient is screaming and agitated.  After getting a dose of Zyprexa, the patient is now cooperative and calm.           ED Course      Procedures                     Results for orders placed or performed during the hospital encounter of 09/21/22   TSH with free T4 reflex     Status: Normal   Result Value Ref Range    TSH 0.42 0.40 - 4.00 mU/L   Basic metabolic panel     Status: Abnormal   Result Value Ref Range    Sodium 140 133 - 144 mmol/L    Potassium 3.2 (L) 3.4 - 5.3 mmol/L    Chloride 107 94 - 109 mmol/L    Carbon Dioxide (CO2) 26 20 - 32 mmol/L    Anion Gap 7 3 - 14 mmol/L    Urea Nitrogen 6 (L) 7 - 30 mg/dL    Creatinine 0.49 (L) 0.52 - 1.04 mg/dL    Calcium 8.8 8.5 - 10.1 mg/dL    Glucose 104 (H) 70 - 99 mg/dL    GFR Estimate >90 >60 mL/min/1.73m2   Asymptomatic COVID-19 Virus (Coronavirus) by PCR Nasopharyngeal     Status: Normal    Specimen: Nasopharyngeal; Swab   Result Value Ref Range    SARS CoV2 PCR Negative Negative    Narrative    Testing was performed using the Xpert Xpress SARS-CoV-2 Assay on the   Cepheid Gene-Xpert Instrument Systems. Additional information about   this Emergency Use Authorization (EUA) assay can be found via the  Lab   Guide. This test should be ordered for the detection of SARS-CoV-2 in   individuals who meet SARS-CoV-2 clinical and/or epidemiological   criteria. Test performance is unknown in asymptomatic patients. This   test is for in vitro diagnostic use under the FDA EUA for   laboratories certified under CLIA to perform high complexity testing.   This test has not been FDA cleared or approved. A negative result   does not rule out the presence of PCR inhibitors in the specimen or   target RNA in concentration below the limit of detection for the   assay. The possibility of a false negative should be considered if   the patient's recent exposure or clinical presentation suggests   COVID-19. This test was validated by the [facility]. This laboratory is certified under the Clinical Laboratory Improvement Amendments of 1988 (CLIA-88) as qualified to perform high complexity laboratory testing.     CBC with platelets and differential     Status: None   Result Value Ref Range    WBC Count 7.5 4.0 - 11.0 10e3/uL    RBC Count 4.63 3.80 - 5.20 10e6/uL    Hemoglobin 12.9 11.7 - 15.7 g/dL    Hematocrit 38.9 35.0 - 47.0 %    MCV 84 78 - 100 fL    MCH 27.9 26.5 - 33.0 pg    MCHC 33.2 31.5 - 36.5 g/dL    RDW 14.0 10.0 - 15.0 %    Platelet Count 220 150 - 450 10e3/uL    % Neutrophils 62 %    % Lymphocytes 30 %    % Monocytes 7 %    % Eosinophils 1 %    % Basophils 0 %    % Immature Granulocytes 0 %    NRBCs per 100 WBC 0 <1 /100    Absolute Neutrophils 4.6 1.6 - 8.3 10e3/uL    Absolute Lymphocytes 2.3 0.8 - 5.3 10e3/uL    Absolute Monocytes 0.5 0.0 - 1.3 10e3/uL    Absolute Eosinophils 0.1 0.0 - 0.7 10e3/uL    Absolute Basophils 0.0 0.0 - 0.2 10e3/uL    Absolute Immature Granulocytes 0.0 <=0.4 10e3/uL    Absolute NRBCs 0.0 10e3/uL   CBC with platelets differential     Status: None    Narrative    The following orders were created for panel order CBC with platelets differential.  Procedure                                Abnormality         Status                     ---------                               -----------         ------                     CBC with platelets and d...[[id number]]                      Final result                 Please view results for these tests on the individual orders.     Medications   OLANZapine (zyPREXA) injection 10 mg (10 mg Intramuscular Given 9/21/22 1950)        Assessments & Plan (with Medical Decision Making)   Ema Rojas is a 32 year old female with a PMH of hyperthyroidism, bipolar disorder and psychosis who presents to the ED today reporting mental health problem.     Ddx: Acute ani, noncompliance with psychiatric medications, agitation, paranoia, insomnia    Patient appears acutely decompensated.  Agitated and screaming on arrival.  Religiously preoccupied.  Noncompliant with medications.  Given Zyprexa with a marked improvement in her behavior.  She is now calm and cooperative.  She is voluntary for psychiatric admission and willing to take medications.  Labs within normal limits.  TSH normal.  COVID negative.  Mental health  recommended admission.  Bed search in process.    I have reviewed the nursing notes. I have reviewed the findings, diagnosis, plan and need for follow up with the patient.    New Prescriptions    No medications on file       Final diagnoses:   Psychosis, unspecified psychosis type (H)   I, Dada Dorman, am serving as a trained medical scribe to document services personally performed by Tracy Willams MD, based on the provider's statements to me.     Tracy VARGAS MD, was physically present and have reviewed and verified the accuracy of this note documented by Dada Dorman.      --  Tracy Willams MD  [facility] EMERGENCY DEPARTMENT  9/21/2022     Tracy Willams MD  09/21/22 [id number]       Tracy Willams MD  09/22/22 6250

## 2022-09-22 ENCOUNTER — TELEPHONE (OUTPATIENT)
Dept: BEHAVIORAL HEALTH | Facility: CLINIC | Age: 32
End: 2022-09-22

## 2022-09-22 LAB
AMPHETAMINES UR QL SCN: NORMAL
BARBITURATES UR QL: NORMAL
BENZODIAZ UR QL: NORMAL
CANNABINOIDS UR QL SCN: NORMAL
COCAINE UR QL: NORMAL
HCG UR QL: NEGATIVE
OPIATES UR QL SCN: NORMAL

## 2022-09-22 PROCEDURE — 96372 THER/PROPH/DIAG INJ SC/IM: CPT | Performed by: EMERGENCY MEDICINE

## 2022-09-22 PROCEDURE — 81025 URINE PREGNANCY TEST: CPT | Performed by: EMERGENCY MEDICINE

## 2022-09-22 PROCEDURE — 80307 DRUG TEST PRSMV CHEM ANLYZR: CPT | Performed by: EMERGENCY MEDICINE

## 2022-09-22 PROCEDURE — 250N000011 HC RX IP 250 OP 636: Performed by: EMERGENCY MEDICINE

## 2022-09-22 PROCEDURE — 250N000013 HC RX MED GY IP 250 OP 250 PS 637: Performed by: EMERGENCY MEDICINE

## 2022-09-22 RX ORDER — OLANZAPINE 10 MG/1
10 TABLET, ORALLY DISINTEGRATING ORAL ONCE
Status: COMPLETED | OUTPATIENT
Start: 2022-09-22 | End: 2022-09-22

## 2022-09-22 RX ORDER — LORAZEPAM 1 MG/1
1 TABLET ORAL ONCE
Status: COMPLETED | OUTPATIENT
Start: 2022-09-22 | End: 2022-09-23

## 2022-09-22 RX ORDER — OLANZAPINE 10 MG/2ML
10 INJECTION, POWDER, FOR SOLUTION INTRAMUSCULAR
Status: COMPLETED | OUTPATIENT
Start: 2022-09-22 | End: 2022-09-22

## 2022-09-22 RX ORDER — OLANZAPINE 10 MG/1
10 TABLET, ORALLY DISINTEGRATING ORAL ONCE
Status: COMPLETED | OUTPATIENT
Start: 2022-09-22 | End: 2022-09-24

## 2022-09-22 RX ORDER — OLANZAPINE 10 MG/2ML
10 INJECTION, POWDER, FOR SOLUTION INTRAMUSCULAR ONCE
Status: DISCONTINUED | OUTPATIENT
Start: 2022-09-22 | End: 2022-09-22

## 2022-09-22 RX ADMIN — OLANZAPINE 10 MG: 10 TABLET, ORALLY DISINTEGRATING ORAL at 04:50

## 2022-09-22 RX ADMIN — OLANZAPINE 10 MG: 10 INJECTION, POWDER, FOR SOLUTION INTRAMUSCULAR at 15:50

## 2022-09-22 ASSESSMENT — ACTIVITIES OF DAILY LIVING (ADL)
ADLS_ACUITY_SCORE: 37

## 2022-09-22 NOTE — ED NOTES
Pt given copy of 72 hour. Pt keeps leaving room and pacing HW's calling people angels, pt at one time making tongue movements at staff and talking about penises. Pt asked to return to room where she was loudly prancing around room singing and dancing. Pt heard from room 12 and to nurses station very clearly. Pt talking about angels and god. Pt also continues to ask several different staff members for her white bible and or cell phone despite continued reminders that pt did not come in with any belongings.

## 2022-09-22 NOTE — ED NOTES
Bed: ED14  Expected date:   Expected time:   Means of arrival:   Comments:  Gina Hernandez 683 32 y/o F ct cerda agitated
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
## 2022-09-22 NOTE — TELEPHONE ENCOUNTER
Inpatient Bed Call Log 9/22/2022 at 8:02a      Adults:     [facility] is posting 0 beds.      [facility] is posting 0 beds.     [facility] is posting 0 beds.     [facility] is posting 0 beds.     [facility] is posting 0 beds.     [facility] is posting 0 beds.     [facility] is posting 0 beds.     [facility] is posting 0 beds.        [facility] is posting 1  beds. Mixed unit 12+. Low acuity only.      [facility] is positing 1 beds. No aggression.      [facility] is posting 0 beds.      [facility] is posting 0 beds. Low acuity only.     [facility] is posting 1 beds.     [facility] is posting 2 beds. Low acuity.      [facility] is posting 1 beds. 72 hr hold required.      [facility] is posting 1 beds.         [facility] is posting 0 beds. Vol only, No Hx of aggression, violence or assault. No sexual offenders. No 72 hr holds.     [facility] is posting 0 beds. (Must have the cognitive ability to do programming. No aggressive or violent behavior or recent HX in the last 2 yrs. MH must be primary.)     [facility] is posting 0 beds. Low acuity only. Violence and aggression capped.      [facility] is posting 0 beds. Low acuity, Neg Covid.      [facility] is posting 0 beds. Covid neg. Vol only. Combined adolescent and adult unit. No aggressive or violent behavior. No registered sex offenders.      [facility] is posting 6 beds. Call for details.        [facility] is posting 0 beds.     8:15am There are no appropriate beds available at this time.

## 2022-09-22 NOTE — ED NOTES
[facility] ED Mental Health Handoff Note:       Brief HPI:  This is a 32 year old female signed out to me by Dr. Ventura.  See initial ED Provider note for full details of the presentation. Ema Rojas is a 32 year old female with history of bipolar disorder and psychosis who presented with a mental health problem.  She presented manic, psychotic and religiously preoccupied.  She was felt to suffer from acute decompensated ani.  Plan at the time of signout is to admit the patient to an inpatient mental health bed.  She is currently awaiting mental health bed availability.    Home meds reviewed and ordered/administered: Yes    Medically stable for inpatient mental health admission: Yes.    Evaluated by mental health: Yes. The recommendation is for inpatient mental health treatment. Bed search in process    Safety concerns: At the time I received sign out, there were no safety concerns.    Hold Status:  Active Orders   Legal    Health Officer Authority to Detain (CHRIS)     Frequency: Effective Now     Start Date/Time: 09/22/22 0434      Number of Occurrences: Until Specified     Order Comments: Pt. unable to care for self              Exam:   No data found.        ED Course:  The patient was seen by extended care [facility]  Stephany in the afternoon.  Please see her documentation for details.  Stephany believes that the patient most likely needs to be placed on a 72-hour hold.  She is decompensated, hyperreligious and very manic.  I evaluated the patient at approximately 1425 pm.  The patient is pacing her room, praying very loudly.  She appears quite manic.  Her thought processes appear impaired.  I ordered Zyprexa for her.  She continues to be agitated and praying loudly.  She is pacing.  She appears to be acutely decompensated.  She does not appear to understand the reasoning behind our recommendation for hospitalization.  I was unable to convince the patient to stay voluntarily.  I placed her on a 72-hour  hold at approximately 1440 pm for mental health admission.   Medications   OLANZapine (zyPREXA) injection 10 mg (has no administration in time range)   OLANZapine (zyPREXA) injection 10 mg (10 mg Intramuscular Given 9/21/22 1950)   OLANZapine zydis (zyPREXA) ODT tab 10 mg (10 mg Oral Given 9/22/22 0450)            There were significant events during my shift.    Patient was signed out to the oncoming provider, Dr. Gr      Impression:    ICD-10-CM    1. Psychosis, unspecified psychosis type (H)  F29        Plan:    1. Awaiting inpatient mental health admission/transfer.      RESULTS:   Results for orders placed or performed during the hospital encounter of 09/21/22 (from the past 24 hour(s))   CBC with platelets differential     Status: None    Collection Time: 09/21/22  8:20 PM    Narrative    The following orders were created for panel order CBC with platelets differential.  Procedure                               Abnormality         Status                     ---------                               -----------         ------                     CBC with platelets and d...[[id number]]                      Final result                 Please view results for these tests on the individual orders.   TSH with free T4 reflex     Status: Normal    Collection Time: 09/21/22  8:20 PM   Result Value Ref Range    TSH 0.42 0.40 - 4.00 mU/L   Basic metabolic panel     Status: Abnormal    Collection Time: 09/21/22  8:20 PM   Result Value Ref Range    Sodium 140 133 - 144 mmol/L    Potassium 3.2 (L) 3.4 - 5.3 mmol/L    Chloride 107 94 - 109 mmol/L    Carbon Dioxide (CO2) 26 20 - 32 mmol/L    Anion Gap 7 3 - 14 mmol/L    Urea Nitrogen 6 (L) 7 - 30 mg/dL    Creatinine 0.49 (L) 0.52 - 1.04 mg/dL    Calcium 8.8 8.5 - 10.1 mg/dL    Glucose 104 (H) 70 - 99 mg/dL    GFR Estimate >90 >60 mL/min/1.73m2   CBC with platelets and differential     Status: None    Collection Time: 09/21/22  8:20 PM   Result Value Ref Range    WBC Count  7.5 4.0 - 11.0 10e3/uL    RBC Count 4.63 3.80 - 5.20 10e6/uL    Hemoglobin 12.9 11.7 - 15.7 g/dL    Hematocrit 38.9 35.0 - 47.0 %    MCV 84 78 - 100 fL    MCH 27.9 26.5 - 33.0 pg    MCHC 33.2 31.5 - 36.5 g/dL    RDW 14.0 10.0 - 15.0 %    Platelet Count 220 150 - 450 10e3/uL    % Neutrophils 62 %    % Lymphocytes 30 %    % Monocytes 7 %    % Eosinophils 1 %    % Basophils 0 %    % Immature Granulocytes 0 %    NRBCs per 100 WBC 0 <1 /100    Absolute Neutrophils 4.6 1.6 - 8.3 10e3/uL    Absolute Lymphocytes 2.3 0.8 - 5.3 10e3/uL    Absolute Monocytes 0.5 0.0 - 1.3 10e3/uL    Absolute Eosinophils 0.1 0.0 - 0.7 10e3/uL    Absolute Basophils 0.0 0.0 - 0.2 10e3/uL    Absolute Immature Granulocytes 0.0 <=0.4 10e3/uL    Absolute NRBCs 0.0 10e3/uL   Asymptomatic COVID-19 Virus (Coronavirus) by PCR Nasopharyngeal     Status: Normal    Collection Time: 09/21/22  8:25 PM    Specimen: Nasopharyngeal; Swab   Result Value Ref Range    SARS CoV2 PCR Negative Negative    Narrative    Testing was performed using the Xpert Xpress SARS-CoV-2 Assay on the   Cepheid Gene-Xpert Instrument Systems. Additional information about   this Emergency Use Authorization (EUA) assay can be found via the Lab   Guide. This test should be ordered for the detection of SARS-CoV-2 in   individuals who meet SARS-CoV-2 clinical and/or epidemiological   criteria. Test performance is unknown in asymptomatic patients. This   test is for in vitro diagnostic use under the FDA EUA for   laboratories certified under CLIA to perform high complexity testing.   This test has not been FDA cleared or approved. A negative result   does not rule out the presence of PCR inhibitors in the specimen or   target RNA in concentration below the limit of detection for the   assay. The possibility of a false negative should be considered if   the patient's recent exposure or clinical presentation suggests   COVID-19. This test was validated by the [facility]  [facility]. This laboratory is certified under the Clinical Laboratory Improvement Amendments of 1988 (CLIA-88) as qualified to perform high complexity laboratory testing.               MD Deny Boyce Alda L, MD  09/22/22 [id number]
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
## 2022-09-22 NOTE — TELEPHONE ENCOUNTER
R: 5PM Bed Search Update: [facility] only      [facility] is posting 0 beds.      [facility] is posting 0 beds.     [facility] is posting 0 beds.     [facility] is posting 0 beds.     [facility] is posting 0 beds.     [facility] is posting 0 beds.     Pt remains on waitlist pending available bed.

## 2022-09-22 NOTE — ED NOTES
"Triage & Transition Services, Extended Care     Therapy Progress Note    Patient: Ema goes by \"Ebise,\" uses she/her pronouns  Date of Service: September 22, 2022  Site of Service: [facility] ED  Patient was seen in-person.     Presenting problem:   Ema is followed related to Long wait time for admission: 19+ hours and 72 Hour Hold: placed by Dr. Barclay on 9/22/22 at 14:36. Please see initial DEC/[facility] Crisis Assessment completed by Kary Love on 9/21/22 for complete assessment information. Notable concerns include ani.     Individuals Present: Ema & Stephany Sheehan    Session start: 13:44  Session end: 14:03  Session duration in minutes: 19  Session number: 1  CPT utilized: 27822 - Psychotherapy (with patient) - 30 (16-37*) min        Current Presentation:   Writer met with patient in room; she is cooperative but her affect is elevated. Her speech is rapid and writer often needs to interrupt her to ask questions. She often repeats the same sentence several times. She is initially seated on the cart however does get up and animatedly walk throughout the room during the conversation. Her thought process is tangential. she abruptly breaks into laughter frequently throughout the conversation, often at inappropriate times. Patient immediately states she is \"much better!\" today and has difficulty remembering events that led to her current ED stay, \"I was talking about Vincent Jesús!\". Patient acknowledges she has not slept for \"many days\" and states she is \"the happiest lady in the world\". She goes on long tangents about \"pure love\" and \"Scientology booms\". She points at the lights and says \"kaboom! Kaboom!\". Patient then begins giggling and stating that she is a \"gift from God... I am isatu gold, smart, astonished, magnificent\". Patient struggles to engage in any conversation regarding her mental health. When asked about medications, she discusses how she was previously prescribed them but then states \"not anymore!\" and " "laughs to herself. Writer explains recommendation for admission, given significant concerns for ani and inability to care for herself and her child at this time. Patient then begins praying very loudly over writer and demands to go home to \"pray for myself at home\".        Mental Status Exam:   Appearance: awake, alert, distracted and casually dressed  Attitude: cooperative  Eye Contact: looking around room  Mood: elevated  Affect: intensity is heightened and intensity is dramatic  Speech: pressured speech and rambling  Psychomotor Behavior: fidgeting  Thought Process:  disorganized and tangental  Associations: no loose associations  Thought Content: no evidence of suicidal ideation or homicidal ideation  Insight: impaired  Judgement: impaired  Oriented to: time, person, and place  Attention Span and Concentration: poor  Recent and Remote Memory: poor    Diagnosis:   296.44 Bipolar I Disorder Current or Most Recent Episode Manic, with psycholtic features     Therapeutic Intervention(s):   Provided active listening, unconditional positive regard, and validation.     Treatment Objective(s) Addressed:   The focus of this session was on rapport building and assessing safety.     Progress Towards Goals:   Patient reports improving symptoms. Patient is not making progress towards treatment goals as evidenced by continued symptoms of ani, limit.     Case Management:   Attempted to call patient's , Juana ([phone]) x2 with no return call.    General Recommendations:   Continue to monitor for harm. Consider: Use clear and concise directions, too many words can be overwhelming and Allow family calls/visits    Plan:   Recommendation remains for patient to be admitted to an inpatient psychiatry unit due to ongoing symptoms of ani that are impairing her ability to safely function in the community. She has no insight into these concerns and is requesting to discharge home. Patient was therefore placed on a 72 " hour hold by ED MD Barclay on 9/22/22 at 14:36. She remains on the intake work list for inpatient admission.        Plan for Care reviewed with Assigned Medical Provider? Yes. Provider, Dr. Jessa Barclay, response: agrees with plan for admission, placed on 72 hour hold.      Stephany Sheehan   Licensed Mental Health Professional (LMHP), [facility]  [phone]

## 2022-09-22 NOTE — ED NOTES
Pt looking for her phone, wants to pray and was screaming. Pt calm now, informed that we don't have her phone. Snack given per request. Pt resting in room.

## 2022-09-22 NOTE — ED NOTES
clarified with  that she did lose a baby in 2020 in utero.baby was a still birth. Currently she has a 9 month old baby boy.

## 2022-09-22 NOTE — PLAN OF CARE
Ema Pandeyga  September 21, 2022  Plan of Care Hand-off Note     Patient Care Path: Inpatient Mental Health    Plan for Care:   Pt presents with severe symptoms of ani and psychosis.  Her insight and judgement are impaired.     Critical Safety Issues: Per RN notes. Pt was grabbing at RN and other staff in the ED hallway.     Overview:  This patient has additional special visitor precautions: No    Legal Status: Voluntary, but holdable due to ani and psychosis     Contacts:   Juana , : Contact [phone]    Psychiatry Consult:  No     Updated RN and Attending Provider regarding plan of care.    Tamie Love, [facility]

## 2022-09-22 NOTE — TELEPHONE ENCOUNTER
R:  00:21 - Possible bed on station 12. CRN reviewing to determine if appropriate for current milieu. 02:35 - 12 CRN called back and suggests day shift review to determine if pt is appropriate for current milieu, as pt has a hx of station 12. Will pass on to oncoming shift    Continuing bed search (metro) @ 02:59:    [facility]: @ cap per website      [facility]: @ cap per website      [facility]: @ cap per website      [facility]: @ cap per website      [facility]: @ cap per website      [facility]: @ cap per website  [place]: @ cap per javy     Pt remains on work list until appropriate placement is available

## 2022-09-22 NOTE — CONSULTS
"Diagnostic Evaluation Consultation  Crisis Assessment    Patient was assessed: In Person  Patient location: [facility] ED   Was a release of information signed: No. Reason: pt too psychotic       Referral Data and Chief Complaint  Pt is a 32 year old, who uses she/her pronouns, and presents to the ED via EMS. Patient is referred to the ED by family/friends. Patient is presenting to the ED for the following concerns: ani.      Informed Consent and Assessment Methods  Patient is her own guardian. Writer met with patient and explained the crisis assessment process, including applicable information disclosures and limits to confidentiality, assessed understanding of the process, and obtained consent to proceed with the assessment. Patient was observed to be able to participate in the assessment as evidenced by engaged in assessment . Assessment methods included conducting a formal interview with patient, review of medical records, collaboration with medical staff, and obtaining relevant collateral information from family and community providers when available..     Over the course of this crisis assessment provided reassurance, offered validation and engaged patient in problem solving and disposition planning.    Summary of Patient Situation  Pt brought to the ED by EMS.  Triage notes indicate that she was outside her home yelling at her . It is unclear who called 911.  In the ED pt appears to be manic. Her speech is pressured, rambling and tangential.  She was hyersexual grabbing at RN and other staff in the hallway.  She was moved to ED 1.   She is able to engage with writer for a brief period of time and then will break out laughing to the point of tears, pointing around the room and saying things that are not understood. She then pats the top of her head and runs her right hand down her left arm and claps, she repeats this several times.   When asked what brings her to the ED she says \"it is a complicated story.\" " "  She is religiously preoccupied and talks about being a Sabianism and then proceeds to try and list off all the different types of Mormonism's.    She is having trouble sleeping and says that she has pain in her head to her arm, she points to the top of her scalp and touches it to the tips of her fingers on her left hand. She talks about the \"word of God\" watching Faith channels on TV, not sleeping for 5 days, the moon, seeing her dad in the moon and from heaven.    History is limited due to condition of the pt.     Brief Psychosocial History  Pt immigrated from [place] in [place]. She is . She has 9 month old child.  In 1/2021 she had a still born full term child.     Significant Clinical History  Pt with history of Bipolar Disorder.  She was hospitalized in 1/2022 and 1/2020 for similar presentation      Collateral Information  Phone call attempted to  Juana [phone]. No answer. Message left. No call returned at the time of this writing      Risk Assessment  ESS-6  1.a. Over the past 2 weeks, have you had thoughts of killing yourself? No  1.b. Have you ever attempted to kill yourself and, if yes, when did this last happen? No   2. Recent or current suicide plan? No   3. Recent or current intent to act on ideation? No  4. Lifetime psychiatric hospitalization? Yes  5. Pattern of excessive substance use? No  6. Current irritability, agitation, or aggression? No  Scoring note: BOTH 1a and 1b must be yes for it to score 1 point, if both are not yes it is zero. All others are 1 point per number. If all questions 1a/1b - 6 are no, risk is negligible. If one of 1a/1b is yes, then risk is mild. If either question 2 or 3, but not both, is yes, then risk is automatically moderate regardless of total score. If both 2 and 3 are yes, risk is automatically high regardless of total score.      Score: 1, mild risk      Does the patient have access to lethal means? Common means outside a secure setting      Does " "the patient engage in non-suicidal self-injurious behavior (NSSI/SIB)? no     Does the patient have thoughts of harming others? No     Is the patient engaging in sexually inappropriate behavior?  no        Current Substance Abuse  Is there recent substance abuse? no     Was a urine drug screen or blood alcohol level obtained: No       Mental Status Exam   Affect: Constricted   Appearance: Appropriate    Attention Span/Concentration: Inattentive  Eye Contact: Avoidant   Fund of Knowledge: Delayed    Language /Speech Content: Expressive Speech   Language /Speech Volume: Normal    Language /Speech Rate/Productions: Pressured    Recent Memory: Intact   Remote Memory: Intact   Mood: Euphoric    Orientation to Person: Yes    Orientation to Place: Yes   Orientation to Time of Day: Yes    Orientation to Date: Yes    Situation (Do they understand why they are here?): Yes    Psychomotor Behavior: Normal    Thought Content: Delusions   Thought Form: Tangential      History of commitment: No     Medication  Psychotropic medications: yes.  She says that she is complaint and that her  gives her her medications daily   Medication changes made in the last two weeks: No       Current Care Team  Primary Care Provider: REUBEN   Psychiatrist: REUBEN \"Dr. Perez\"  Therapist: No  : No   CTSS or ARMHS: No  ACT Team: No  Other: No    Diagnosis  296.44 Bipolar I Disorder Current or Most Recent Episode Manic, with psycholtic features     Clinical Summary and Substantiation of Recommendations   Pt presents with severe symptoms of ani and psychosis.  Her insight and judgement are impaired.   Admission to Inpatient Level of Care is indicated due to:  1. Patient risk of severity of behavioral health disorder is appropriate to proposed level of care as indicated by:    Behavioral health disorder is present and appropriate for inpatient care with both of the following:     Severe psychiatric, behavioral or other comorbid conditions " are appropriate for management at inpatient mental health as indicated by at least one of the following:   o Hallucinations; delusions; positive symptoms, Cesia and Hyperactivity or inattention    Severe dysfunction in daily living is present as indicated by at least one of the following:   o Incapacitation because of grave disability    2. Inpatient mental health services are necessary to meet patient needs and at least one of the following:  Specific condition related to admission diagnosis is present and judged likely to further improve at proposed level of care and Specific condition related to admission diagnosis is present and judged likely to deteriorate in absence of treatment at proposed level of care    3. Situation and expectations are appropriate for inpatient care, as indicated by one of the following:   Patient management/treatment at lower level of care is not feasible or is inappropriate    Disposition  Recommended disposition: Inpatient Mental Health       Reviewed case and recommendations with attending provider. Attending Name: Dr Willams       Attending concurs with disposition: Yes       Patient concurs with disposition: Yes       Guardian concurs with disposition: NA      Final disposition: Inpatient mental health .     Inpatient Details (if applicable):   Is patient admitted voluntarily:Yes      Patient aware of potential for transfer if there is not appropriate placement? Yes       Patient is willing to travel outside of the [facility] for placement? No      Behavioral Intake Notified? Yes: Date: 9/21@83. [place] .     Assessment Details  Patient interview started at: 730p and completed at: 755p     Total duration spent on the patient case in minutes: 1.50 hrs      CPT code(s) utilized: 84880 - Psychotherapy for Crisis - 60 (30-74*) min       Tamie Love, MSW, LICSW, LMHP  DEC - Triage & Transition Services  Callback: [phone]

## 2022-09-23 ENCOUNTER — TELEPHONE (OUTPATIENT)
Dept: BEHAVIORAL HEALTH | Facility: CLINIC | Age: 32
End: 2022-09-23

## 2022-09-23 PROCEDURE — 250N000013 HC RX MED GY IP 250 OP 250 PS 637: Performed by: EMERGENCY MEDICINE

## 2022-09-23 PROCEDURE — 99233 SBSQ HOSP IP/OBS HIGH 50: CPT | Mod: 25

## 2022-09-23 RX ORDER — OLANZAPINE 10 MG/1
10 TABLET, ORALLY DISINTEGRATING ORAL AT BEDTIME
Status: DISCONTINUED | OUTPATIENT
Start: 2022-09-23 | End: 2022-09-30 | Stop reason: HOSPADM

## 2022-09-23 RX ORDER — ACETAMINOPHEN 325 MG/1
975 TABLET ORAL ONCE
Status: COMPLETED | OUTPATIENT
Start: 2022-09-23 | End: 2022-09-23

## 2022-09-23 RX ADMIN — OLANZAPINE 10 MG: 10 TABLET, ORALLY DISINTEGRATING ORAL at 21:04

## 2022-09-23 RX ADMIN — LORAZEPAM 1 MG: 1 TABLET ORAL at 11:05

## 2022-09-23 RX ADMIN — ACETAMINOPHEN 975 MG: 325 TABLET, FILM COATED ORAL at 22:54

## 2022-09-23 ASSESSMENT — ACTIVITIES OF DAILY LIVING (ADL)
ADLS_ACUITY_SCORE: 37

## 2022-09-23 NOTE — ED NOTES
"Pt's spouse called, asking why Pt cannot get a bed and suggested transfer to another hospital.  Writer explained that there are no other hospitals with beds right now.  Pt's spouse repetitive with writer, does not seem to comprehend the state of hospitals right now.  Writer told spouse the number of hospitals that have been tried for a bed.  Spouse told writer Pt continues to call him and ask to be picked up.  Writer suggested not answering the calls for the evening, assured Pt is being watched closely and we could try to take her phone for the evening.  Spouse stated, \"I am not concerned about her phone, I need her to get admitted to a bed.\"  Writer assured spouse again that she is being monitored closely and she is being taken care of by staff.    "

## 2022-09-23 NOTE — ED NOTES
Pt's visitor came with pt to talk to writer, because pt would not directly speak to this writer. Pt told her visitor to tell writer that there are chips being put in her arm and we need to look at her arm and take them out. Pt also requested that her food be warmed up, again through the visitor rather than directly to writer. Visitor able to redirect pt once food was warmed up.

## 2022-09-23 NOTE — TELEPHONE ENCOUNTER
R:  9/23/22 9 AM  [facility] in [facility] ED [facility]  Bed search completed:    [facility] no appropriate beds available  [facility]: @ cap  [facility]: @ cap  [facility]: @ cap  [facility]: @ cap  [facility]: @ cap   [place]: @ cap   Pt will remain on work list.    3 PM Pt in the RV ED.  Currently no appropriate beds avail in [facility] and [facility].   Will remain on work list until placement found.

## 2022-09-23 NOTE — TELEPHONE ENCOUNTER
Updated Bed Search @ 6pm:  Per chart review, intake can look in the metro area for placement     [facility] has 0 appropriate beds available. Phone: [phone]  [facility] posting 0 available beds. Phone: [phone]  [facility] posting 0 available beds. Phone: [phone]  [facility] posting 0 available beds. Phone: [phone]  [facility] posting 0 available beds. Phone: [phone]  [facility] posting 0 available beds. Phone:[phone]  [facility] posting 0 available beds. Phone: [phone]  [facility] posting 0 available beds. Phone: [phone]     Pt remains on work list pending appropriate bed placement.

## 2022-09-23 NOTE — ED NOTES
"Triage & Transition Services, Extended Care     Therapy Progress Note    Patient: Ema goes by \"Ebise,\" uses she/her pronouns  Date of Service: September 23, 2022  Site of Service: ED12  Patient was seen in-person.     Presenting problem:   Ema is followed related to Long wait time for admission: 41+ hours in the ED and 72 Hour Hold: expires 9/27/22. Please see initial DEC/[facility] Crisis Assessment completed by ITZ Bang on 9/21/22 for complete assessment information. Notable concerns include: ani      Individuals Present: Ema & ITZ Wong    Session start: 1215PM  Session end: 1232PM  Session duration in minutes: 17  Session number: 2  Anticipated number of sessions or this episode of care: 1-3  CPT utilized: 08852 - Psychotherapy (with patient) - 30 (16-37*) min    Current Presentation:   Pt was cheerful and cooperative. She stated \"im okay\" and shared that she rested well last night. Pt reported that she met with psych consult and agrees to take medications. Pt reports she has a happy family at home with her 10 month old son and . She shared that she lost a baby in 2020 and wants another baby but is happy because someone dont even have one. Pt shared that she is a Buddhist and enjoys reading her bible. She reported that she believe Jesús will be returning soon however, stated that she understand that others may believe differently. Pt shared that this is her third hospitalization and is unsure what brought her in however, at times she loses sleep and has \"small depression\" She shared that last time she was at [facility] she experienced pain and heat from her head to her hand when the computers were near here. She stated doctors know all but didn't know why that was happening. Pt pleasant and engaged. At times there appeared to be challenges with communication due to the language barrier.      Mental Status Exam:   Appearance: awake, alert  Attitude: cooperative  Eye Contact: " good  Mood: good  Affect: mood congruent  Speech: clear, coherent  Psychomotor Behavior: no evidence of tardive dyskinesia, dystonia, or tics  Thought Process:  Compared to documentation from yesterday pt's thought processed appears to be more linear today.   Associations: no loose associations  Thought Content: no evidence of suicidal ideation or homicidal ideation  Insight: fair  Judgement: fair  Oriented to: time, person, and place  Attention Span and Concentration: intact  Recent and Remote Memory: fair    Diagnosis:   296.44 Bipolar I Disorder Current or Most Recent Episode Manic, with psycholtic features     Therapeutic Intervention(s):   Provided active listening, unconditional positive regard, and validation.     Treatment Objective(s) Addressed:   The focus of this session was on rapport building and assessing safety     Case Management:   Attempted to call patient's , Juana ([phone])  with no return call.    Writer collaborated with psych consult provider Bran Hercules. Please see his note for complete details. Per his request the need for commitment will be re-evaluated on Monday and depending on if pt continues to decline mediations.     General Recommendations:   Continue to monitor for harm. Consider: Use clear and concise directions, too many words can be overwhelming and Allow family calls/visits    Plan:   Inpatient mental health admission for stabilization. Pt is currently on a 72 hour hold which expires 9/27/2022   Alternative disposition may be appropriate if pt agrees to medication over the weekend  and with further consult from psychiatry before discharge.     Plan for Care reviewed with Assigned Medical Provider? Yes. Provider, Dr. Cade, response: in agreement.      Kaylee Oliveira [facility]   Licensed Mental Health Professional (LMHP), [facility]  [phone]

## 2022-09-23 NOTE — ED NOTES
Patients  called and stated she was calling him non-stop. She continues to call him and scream and yell and him, attempting to have patient rest and turn phone off.

## 2022-09-23 NOTE — ED NOTES
Patient initially agreed to take medication to help relax as patient was getting anxious and ramped up over her  not believing her. When RN came to room with medication, patient declined and stated she would fall asleep on her own.

## 2022-09-23 NOTE — CONSULTS
Ema Rojas MRN# [id number]   Age: 32 year old YOB: 1990   Date of Admission to ED: 9/21/2022    In person visit Details:     Patient was assessed and interviewed face-to-face in person with this writer chacho. Patient was observed to be able to participate in the assessment as evidenced by verbal consent. Assessment methods included conducting a formal interview with patient, review of medical records, collaboration with medical staff, and obtaining relevant collateral information from family and community providers when available.        Reason for Consult:   Review for commitment, patient is under 72-hour hold        HPI:   Bob Boo is a 32 years old patient who uses she/her pronouns brought to emergency room by EMS due to concern of ani.  Patient was hospitalized at at [facility] for ani, worsening psychosis from January 29, 2022 until February 7, 2022 discharged home with olanzapine 15 mg at bedtime.  Patient previously tried Abilify but he stopped taking by herself.    Patient is alert and oriented x4, very pleasant and cooperative during assessment and interview, pressured speech, jumping from topic to topic.  At the same time patient was saying she was not safe at home due to auditory hallucination, hearing voices such as her father who passed away years ago was talking to her.  Although patient denied voices are telling her to hurt herself or to hurt somebody else , denied suicidal ideation or homicidal ideation or self injury behavior.    Patient is hyperverbal, disorganized thinking and hyper religiously during assessment and interview, continue to denied any suicidal ideation and homicidal ideation or self injury behavior.  Patient denied any depression or anxiety currently rating her depression 0 out of 10, and her anxiety 0 out of 10.  Patient said she feels safe at home.  Also patient denied any substance abuse, her drug urine toxicology was negative.    Patient willing to come  inpatient voluntarily, and the patient understood the risk of not taking medication while she is psychotic.  Patient agreed to continue to take neuroleptic medications as ordered by providers.     Patient was immigrated from [place] in 2016, speak [place] language, this writer also speak her language fluently, unable to communicate with patient in her language.  Patient reported stopped taking her olanzapine at home, and she reported she has not been sleeping the last 3 or 4 days and she has been struggling with insomnia for a while  Patient may benefit starting olanzapine at bedtime 10 mg.  Brief Therapeutic Intervention(s):   Provided active listening, unconditional positive regard, and validation. Engaged in cognitive restructuring/ reframing, looked at common cognitive distortions and challenged negative thoughts. Engaged in guided discovery, explored patient's perspectives and helped expand them through socratic dialogue. Provided positive reinforcement for progress towards goals, gains in knowledge, and application of skills previously taught.  Engaged in social skills training. Explored and identified early warning signs to anger.        Past Psychiatric History:   Patient was hospitalized at at [facility] from January 29 until February 2 for psychosis and ani.  Also patient was hospitalized in November 2020, history of refusing medications as prescribed by psychiatry.          Substance Use and History:     None         Past Medical History:   PAST MEDICAL HISTORY:   Past Medical History:   Diagnosis Date     Hyperthyroidism     she has seen endocrinology 03/2021     Iron deficiency anemia secondary to inadequate dietary iron intake 6/15/2020     Prior pregnancy with fetal demise 07/2020       PAST SURGICAL HISTORY:   Past Surgical History:   Procedure Laterality Date     NO HISTORY OF SURGERY                 Allergies:   No Known Allergies          Medications:   I have reviewed this patient's current  medications           Family History:   FAMILY HISTORY:   Family History   Problem Relation Age of Onset     No Known Problems Mother      No Known Problems Father      No Known Problems Maternal Grandmother      No Known Problems Maternal Grandfather      No Known Problems Paternal Grandmother      No Known Problems Paternal Grandfather      No Known Problems Brother      No Known Problems Sister      No Known Problems Brother      No Known Problems Sister      No Known Problems Sister      No Known Problems Sister            Social History:   SOCIAL HISTORY:   Social History     Tobacco Use     Smoking status: Never Smoker     Smokeless tobacco: Never Used   Substance Use Topics     Alcohol use: Never            PTA Medications:   (Not in a hospital admission)         Allergies:   No Known Allergies       Labs:     Recent Results (from the past 48 hour(s))   TSH with free T4 reflex    Collection Time: 09/21/22  8:20 PM   Result Value Ref Range    TSH 0.42 0.40 - 4.00 mU/L   Basic metabolic panel    Collection Time: 09/21/22  8:20 PM   Result Value Ref Range    Sodium 140 133 - 144 mmol/L    Potassium 3.2 (L) 3.4 - 5.3 mmol/L    Chloride 107 94 - 109 mmol/L    Carbon Dioxide (CO2) 26 20 - 32 mmol/L    Anion Gap 7 3 - 14 mmol/L    Urea Nitrogen 6 (L) 7 - 30 mg/dL    Creatinine 0.49 (L) 0.52 - 1.04 mg/dL    Calcium 8.8 8.5 - 10.1 mg/dL    Glucose 104 (H) 70 - 99 mg/dL    GFR Estimate >90 >60 mL/min/1.73m2   CBC with platelets and differential    Collection Time: 09/21/22  8:20 PM   Result Value Ref Range    WBC Count 7.5 4.0 - 11.0 10e3/uL    RBC Count 4.63 3.80 - 5.20 10e6/uL    Hemoglobin 12.9 11.7 - 15.7 g/dL    Hematocrit 38.9 35.0 - 47.0 %    MCV 84 78 - 100 fL    MCH 27.9 26.5 - 33.0 pg    MCHC 33.2 31.5 - 36.5 g/dL    RDW 14.0 10.0 - 15.0 %    Platelet Count 220 150 - 450 10e3/uL    % Neutrophils 62 %    % Lymphocytes 30 %    % Monocytes 7 %    % Eosinophils 1 %    % Basophils 0 %    % Immature Granulocytes 0  %    NRBCs per 100 WBC 0 <1 /100    Absolute Neutrophils 4.6 1.6 - 8.3 10e3/uL    Absolute Lymphocytes 2.3 0.8 - 5.3 10e3/uL    Absolute Monocytes 0.5 0.0 - 1.3 10e3/uL    Absolute Eosinophils 0.1 0.0 - 0.7 10e3/uL    Absolute Basophils 0.0 0.0 - 0.2 10e3/uL    Absolute Immature Granulocytes 0.0 <=0.4 10e3/uL    Absolute NRBCs 0.0 10e3/uL   Asymptomatic COVID-19 Virus (Coronavirus) by PCR Nasopharyngeal    Collection Time: 09/21/22  8:25 PM    Specimen: Nasopharyngeal; Swab   Result Value Ref Range    SARS CoV2 PCR Negative Negative   HCG qualitative urine (UPT)    Collection Time: 09/22/22  3:22 PM   Result Value Ref Range    hCG Urine Qualitative Negative Negative   Drug abuse screen 1 urine (ED)    Collection Time: 09/22/22  3:22 PM   Result Value Ref Range    Amphetamines Urine Screen Negative Screen Negative    Barbiturates Urine Screen Negative Screen Negative    Benzodiazepines Urine Screen Negative Screen Negative    Cannabinoids Urine Screen Negative Screen Negative    Cocaine Urine Screen Negative Screen Negative    Opiates Urine Screen Negative Screen Negative          Physical and Psychiatric Examination:     /78   Pulse 75   Temp 98.1  F (36.7  C) (Oral)   Resp 16   LMP 09/17/2022 (Exact Date)   SpO2 99%   Weight is 0 lbs 0 oz  There is no height or weight on file to calculate BMI.    Mental Status Exam:  Appearance: awake, alert, adequately groomed and dressed in hospital scrubs  Attitude:  cooperative  Eye Contact:  good  Mood:  good  Affect:  appropriate and in normal range  Speech:  clear, coherent  Language: fluent and intact in English  Psychomotor, Gait, Musculoskeletal:  no evidence of tardive dyskinesia, dystonia, or tics  Thought Process:  logical and linear  Associations:  no loose associations  Thought Content:  no evidence of suicidal ideation or homicidal ideation, auditory hallucinations present and visual hallucinations present  Insight:  fair  Judgement:  fair  Oriented to:   time, person, and place  Attention Span and Concentration:  fair  Recent and Remote Memory:  fair  Fund of Knowledge:  low-normal         Diagnoses:   Psychosis, unspecified psychosis type (H)  Unspecified psychosis: rule out Bipolar I Disorder, most recent episode manic, with psychotic        Recommendations:     1. Pt displays the following risk factors that support IP admission:  Due to manic, hallucination delusional hyperactivity, hyperreligiosity  - Continue to recommend inpatient psychiatric hospitalizations for further stabilization   2.  Start 10 mg Zyprexa at bedtime to target symptoms of ani, and her insomnia   treatment per ED team    3.  Continue 72-hour hold, for now, patient is not committable willing to come inpatient mental health voluntarily.    - Consulted with [facility], ED physician, regarding this case.    Please call [facility]/DEC at [phone] if you have follow-up questions or wish to place another consult.  krissy Irving Psychiatric Nurse practitioner    Attestation:  Time with:  Patient: 30 minutes  Treatment Team:20 Minutes  Chart Review: 20 minutes    Total time spent was 70 minutes. Over 50% of times was spent counseling and coordination of care.    I, Bran Hercules, CNP, APRN, Psychiatric Nurse Practitioner have personally performed an examination of this patient.  I have edited the note to reflect all relevant changes.  I have discussed this patient with the care team September 23, 2022.  I have reviewed all vitals and laboratory findings.    Disclaimer: This note consists of symbols derived from keyboarding,

## 2022-09-23 NOTE — ED NOTES
Patient's  called requesting an update on patient. Writer updated  that patient has been sleeping since writer took over her care at 2330 last night. Patient's  reported that patient may look like she is sleeping but she is not really sleeping. Writer reported that patient has been laying in bed with her eyes closed since 2330.  asked if patient received medication. Writer reported that it appears from the documentation that patient refused medication for the nurse prior to writer.  reported that patient is unable to make her own decisions at this time and asking how to get patient to take medication with out her consent. Writer informed patient's  that we are unable to force people to take oral medication and can only give patient medication against their consent in emergency situations, if they are a harm to themselves and/or others.
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
## 2022-09-23 NOTE — ED PROVIDER NOTES
[facility] ED Mental Health Handoff Note:       Brief HPI:  This is a 32 year old female signed out to me by Dr. Weiner.  See initial ED Provider note for full details of the presentation.     Home meds reviewed and ordered/administered: Yes    Medically stable for inpatient mental health admission: Yes.    Evaluated by mental health: Yes. The recommendation is for inpatient mental health treatment. Bed search in process    Safety concerns: At the time I received sign out, there were no safety concerns.    Hold Status:  Active Orders   Legal    Emergency Hospitalization Hold (72 Hr Hold)     Frequency: Effective Now     Start Date/Time: 09/22/22 1436      Number of Occurrences: Until Specified    Health Officer Authority to Detain (CHRIS)     Frequency: Effective Now     Start Date/Time: 09/22/22 0434      Number of Occurrences: Until Specified     Order Comments: Pt. unable to care for self           Exam:   Patient Vitals for the past 24 hrs:   BP Temp Temp src Pulse Resp SpO2   09/22/22 1321 98/70 97.9  F (36.6  C) Oral 65 16 95 %           ED Course:    Medications   OLANZapine zydis (zyPREXA) ODT tab 10 mg (10 mg Oral Not Given 9/22/22 1530)   LORazepam (ATIVAN) tablet 1 mg (1 mg Oral Not Given 9/22/22 2017)   OLANZapine (zyPREXA) injection 10 mg (10 mg Intramuscular Given 9/21/22 1950)   OLANZapine zydis (zyPREXA) ODT tab 10 mg (10 mg Oral Given 9/22/22 0450)   OLANZapine (zyPREXA) injection 10 mg (10 mg Intramuscular Given 9/22/22 1550)            There were no significant events during my shift.  psychiatry recommending starting zyprexa 10 mg at bedtime which was ordered.     Patient was signed out to the oncoming provider.       Impression:    ICD-10-CM    1. Psychosis, unspecified psychosis type (H)  F29        Plan:    1. Awaiting inpatient mental health admission/transfer.      RESULTS:   Results for orders placed or performed during the hospital encounter of 09/21/22 (from the past 24 hour(s))    HCG qualitative urine (UPT)     Status: Normal    Collection Time: 09/22/22  3:22 PM   Result Value Ref Range    hCG Urine Qualitative Negative Negative   Urine Drugs of Abuse Screen     Status: Normal    Collection Time: 09/22/22  3:22 PM    Narrative    The following orders were created for panel order Urine Drugs of Abuse Screen.  Procedure                               Abnormality         Status                     ---------                               -----------         ------                     Drug abuse screen 1 urin...[[id number]]  Normal              Final result                 Please view results for these tests on the individual orders.   Drug abuse screen 1 urine (ED)     Status: Normal    Collection Time: 09/22/22  3:22 PM   Result Value Ref Range    Amphetamines Urine Screen Negative Screen Negative    Barbiturates Urine Screen Negative Screen Negative    Benzodiazepines Urine Screen Negative Screen Negative    Cannabinoids Urine Screen Negative Screen Negative    Cocaine Urine Screen Negative Screen Negative    Opiates Urine Screen Negative Screen Negative             MD Rayo Mack Cara, MD  09/23/22 0922       Yana Cade MD  09/23/22 7928

## 2022-09-24 ENCOUNTER — TELEPHONE (OUTPATIENT)
Dept: BEHAVIORAL HEALTH | Facility: CLINIC | Age: 32
End: 2022-09-24

## 2022-09-24 PROCEDURE — 250N000011 HC RX IP 250 OP 636: Performed by: EMERGENCY MEDICINE

## 2022-09-24 PROCEDURE — 124N000002 HC R&B MH UMMC

## 2022-09-24 PROCEDURE — 250N000013 HC RX MED GY IP 250 OP 250 PS 637: Performed by: PSYCHIATRY & NEUROLOGY

## 2022-09-24 PROCEDURE — 250N000013 HC RX MED GY IP 250 OP 250 PS 637: Performed by: EMERGENCY MEDICINE

## 2022-09-24 RX ORDER — AMOXICILLIN 250 MG
1 CAPSULE ORAL 2 TIMES DAILY PRN
Status: DISCONTINUED | OUTPATIENT
Start: 2022-09-24 | End: 2022-09-24

## 2022-09-24 RX ORDER — AMOXICILLIN 250 MG
1 CAPSULE ORAL 2 TIMES DAILY
Status: DISCONTINUED | OUTPATIENT
Start: 2022-09-24 | End: 2022-09-25

## 2022-09-24 RX ORDER — MAGNESIUM HYDROXIDE/ALUMINUM HYDROXICE/SIMETHICONE 120; 1200; 1200 MG/30ML; MG/30ML; MG/30ML
30 SUSPENSION ORAL EVERY 4 HOURS PRN
Status: DISCONTINUED | OUTPATIENT
Start: 2022-09-24 | End: 2022-09-30 | Stop reason: HOSPADM

## 2022-09-24 RX ORDER — ONDANSETRON 8 MG/1
8 TABLET, ORALLY DISINTEGRATING ORAL ONCE
Status: COMPLETED | OUTPATIENT
Start: 2022-09-24 | End: 2022-09-24

## 2022-09-24 RX ORDER — OLANZAPINE 10 MG/2ML
10 INJECTION, POWDER, FOR SOLUTION INTRAMUSCULAR 3 TIMES DAILY PRN
Status: DISCONTINUED | OUTPATIENT
Start: 2022-09-24 | End: 2022-09-30 | Stop reason: HOSPADM

## 2022-09-24 RX ORDER — ACETAMINOPHEN 325 MG/1
650 TABLET ORAL EVERY 4 HOURS PRN
Status: DISCONTINUED | OUTPATIENT
Start: 2022-09-24 | End: 2022-09-30 | Stop reason: HOSPADM

## 2022-09-24 RX ORDER — ONDANSETRON 4 MG/1
4-8 TABLET, ORALLY DISINTEGRATING ORAL EVERY 8 HOURS PRN
Status: DISCONTINUED | OUTPATIENT
Start: 2022-09-24 | End: 2022-09-30 | Stop reason: HOSPADM

## 2022-09-24 RX ORDER — OLANZAPINE 10 MG/1
10 TABLET ORAL 3 TIMES DAILY PRN
Status: DISCONTINUED | OUTPATIENT
Start: 2022-09-24 | End: 2022-09-30 | Stop reason: HOSPADM

## 2022-09-24 RX ORDER — HYDROXYZINE HYDROCHLORIDE 25 MG/1
25 TABLET, FILM COATED ORAL EVERY 4 HOURS PRN
Status: DISCONTINUED | OUTPATIENT
Start: 2022-09-24 | End: 2022-09-30 | Stop reason: HOSPADM

## 2022-09-24 RX ORDER — LANOLIN ALCOHOL/MO/W.PET/CERES
3 CREAM (GRAM) TOPICAL
Status: DISCONTINUED | OUTPATIENT
Start: 2022-09-24 | End: 2022-09-30 | Stop reason: HOSPADM

## 2022-09-24 RX ADMIN — ONDANSETRON 8 MG: 8 TABLET, ORALLY DISINTEGRATING ORAL at 10:01

## 2022-09-24 RX ADMIN — SENNOSIDES AND DOCUSATE SODIUM 1 TABLET: 50; 8.6 TABLET ORAL at 20:58

## 2022-09-24 RX ADMIN — OLANZAPINE 10 MG: 10 TABLET, ORALLY DISINTEGRATING ORAL at 10:11

## 2022-09-24 RX ADMIN — OLANZAPINE 10 MG: 10 TABLET, ORALLY DISINTEGRATING ORAL at 20:58

## 2022-09-24 RX ADMIN — MELATONIN TAB 3 MG 3 MG: 3 TAB at 20:58

## 2022-09-24 ASSESSMENT — ACTIVITIES OF DAILY LIVING (ADL)
CHANGE_IN_FUNCTIONAL_STATUS_SINCE_ONSET_OF_CURRENT_ILLNESS/INJURY: NO
DIFFICULTY_EATING/SWALLOWING: NO
ADLS_ACUITY_SCORE: 37
LAUNDRY: WITH SUPERVISION
ORAL_HYGIENE: INDEPENDENT
DRESSING/BATHING_DIFFICULTY: NO
ADLS_ACUITY_SCORE: 37
TOILETING_ISSUES: NO
ADLS_ACUITY_SCORE: 37
HYGIENE/GROOMING: INDEPENDENT
WEAR_GLASSES_OR_BLIND: NO
ADLS_ACUITY_SCORE: 37
DRESS: INDEPENDENT;SCRUBS (BEHAVIORAL HEALTH)
ADLS_ACUITY_SCORE: 37
DIFFICULTY_COMMUNICATING: NO
FALL_HISTORY_WITHIN_LAST_SIX_MONTHS: NO
ADLS_ACUITY_SCORE: 37
ADLS_ACUITY_SCORE: 40
ADLS_ACUITY_SCORE: 37
WALKING_OR_CLIMBING_STAIRS_DIFFICULTY: NO
ADLS_ACUITY_SCORE: 40
CONCENTRATING,_REMEMBERING_OR_MAKING_DECISIONS_DIFFICULTY: NO
ADLS_ACUITY_SCORE: 57
HEARING_DIFFICULTY_OR_DEAF: NO
DOING_ERRANDS_INDEPENDENTLY_DIFFICULTY: NO

## 2022-09-24 NOTE — TELEPHONE ENCOUNTER
R: The pt is currently in the [place] ER awaiting placement.     Intake Morning Bed Search (Done at 10:13 AM)  [facility] is posting 0 beds.   [facility] is posting 0 beds.  [facility] is posting 0 beds.  [facility] is posting 0 beds.  [facility] is posting 0 beds.  [facility] is posting 0 beds.  [facility] is posting 0 beds.  [facility] is posting 0 beds. Low acuity.    [facility] is posting 3 beds. Mixed unit 12+. Low acuity only.  10:16 AM Intake called and spoke to Sandra, no beds currently but will call intake back if beds open  [facility] is posting 1 bed. No aggression. 10:18 AM Intake called and spoke to Nicole, patient is deemed moderate acuity d/t ani and there are no beds at this time for this level of acuity.   [facility] is posting 0 beds.   [facility] is posting 0 beds. Low acuity only.  [facility] is posting 1 bed. Low acuity only. 10:21 AM Intake called, no answer. Intake to call again.   [facility] is posting 1 bed. 0 acute, 0 med psych, 1 mood disorder- very low acuity. 10:23 AM Intake called and spoke to Terence Wilkso is full, [place] is closed for admissions and [facility] is at capacity.   [facility] is posting 1 bed. Low acuity only. 72 hr hold required. 10:26 AM Intake called and spoke to Elvia, bed available and patient to be reviewed. Intake faxed clinical to [facility] at [phone]  [facility] is posting 2 beds. Low acuity. 10:23 AM Intake called and spoke to Archie Wilkskato is full, [place] is closed for admissions and [facility] is at capacity    [facility] is posting 0 beds. Vol only, No Hx of aggression, violence, or assault. No sexual offenders. No 72 hr holds.    [facility] is posting 7 beds. (Must have the cognitive ability to do programming. No aggressive or violent behavior or recent HX in the last 2 yrs. MH must be primary).  [facility] is  posting 0 beds. Low acuity only. Violence and aggression capped.   [facility] is posting 0 beds. Low acuity, Neg Covid.   [facility] is posting 2 beds. Covid neg. Vol only. Combined adolescent and adult unit. No aggressive or violent behavior. No registered sex offenders. Pt does not fit bed- on a 72HH  [facility] is posting 14 beds. No Covid needed. Call for details.  [facility] is posting 0 beds. Mixed Unit (13+).    [facility] and [place] location at capacity. The pt remains on the waitlist. Intake continues to identify appropriate bed placement.    10:54 AM Intake [facility] admission, pt under review.

## 2022-09-24 NOTE — ED NOTES
Pt c/o nausea and zofran was given.  Pt was calm and cooperative, answered questions appropriately and denied any mental health concerns.  Pt went into room after and started ramping up yelling and appeared to be extremely anxious and appeared to be responding to internal stimuli.  Writer gave pt zyrexa prn.

## 2022-09-24 NOTE — ED NOTES
Pt c/o lower back pain, denies any trauma, denies changes on bowel and bladder. Tylenol given as ordered

## 2022-09-24 NOTE — ED NOTES
Pt has been calm and pleasant since writer took over at 19:00.  Pt was with another patient both listening to music on cellphone. Behavior was appropriate.  Independent with ADL's and ambulation.  Compliant with meds  Offered patient personal hygiene items.  Pt accepted.

## 2022-09-24 NOTE — ED NOTES
Triage & Transition Services, Extended Care     Ema Rojas  September 24, 2022    [place] is followed related to Long wait time for admission: Patient has been in the ED for 69+ hours. Please see initial DEC Crisis Assessment completed for complete assessment information. Medical record is reviewed. While patient is in the ED, care team is working towards Learn and Demonstrate at [facility] One Skill Focused on Crisis Stabilization. Additional notes include: endorsing continued symptoms of psychosis.    Writer attempted to meet with patient. During this attempt, patient was observed to be tearful and responding to internal stimuli. Writer unable to meet with patient due to this.    There are not significant status changes.       Plan:  Patient continues to appear to endorse symptoms of psychosis. Patient unable to engage in coping skills to mitigate these symptoms. Inpatient hospitalization continues to bee recommended at this time.    Plan for Care reviewed with Assigned Medical Provider? Yes. Provider, Dr. Barclay, response: agreeable    Extended Care will follow and meet with patient/family/care team as able or requested.     SO GRIFFIN, ANTONIA, LGSW, [facility], Extended Care   [phone]

## 2022-09-24 NOTE — ED NOTES
[facility] ED Mental Health Handoff Note:       Brief HPI:  This is a 32 year old female signed out to me by Dr. Duque.  See initial ED Provider note for full details of the presentation. Ema Rojas is a 32 year old female with a h/o bipolar disorder and psychosis who presented over 60 hours ago to the ED with mental health issues.  She is currently awaiting inpatient psychiatric bed availability.  She is on a 72-hour hold for mental health admission    Home meds reviewed and ordered/administered: Yes    Medically stable for inpatient mental health admission: Yes.    Evaluated by mental health: Yes. The recommendation is for inpatient mental health treatment. Bed search in process    Safety concerns: At the time I received sign out, there were no safety concerns.    Hold Status:  Active Orders   Legal    Emergency Hospitalization Hold (72 Hr Hold)     Frequency: Effective Now     Start Date/Time: 09/22/22 1436      Number of Occurrences: Until Specified    Health Officer Authority to Detain (CHRIS)     Frequency: Effective Now     Start Date/Time: 09/22/22 0434      Number of Occurrences: Until Specified     Order Comments: Pt. unable to care for self              Exam:   Patient Vitals for the past 24 hrs:   BP Temp Temp src Pulse Resp SpO2   09/24/22 1004 120/83 97.8  F (36.6  C) Oral 85 16 100 %   09/23/22 2103 -- 98.3  F (36.8  C) Oral 78 16 99 %   09/23/22 1732 105/74 98.1  F (36.7  C) Oral 75 16 99 %           ED Course:  I provided a work excuse note for her , Juana Chester to excuse him from work during the duration of the patient's hospitalization so he can care for the children.  Medications   OLANZapine zydis (zyPREXA) ODT tab 10 mg (10 mg Oral Given 9/23/22 2104)   OLANZapine (zyPREXA) injection 10 mg (10 mg Intramuscular Given 9/21/22 1950)   OLANZapine zydis (zyPREXA) ODT tab 10 mg (10 mg Oral Given 9/22/22 0450)   OLANZapine (zyPREXA) injection 10 mg (10 mg Intramuscular Given 9/22/22  1550)   OLANZapine zydis (zyPREXA) ODT tab 10 mg (10 mg Oral Given 9/24/22 1011)   LORazepam (ATIVAN) tablet 1 mg (1 mg Oral Given 9/23/22 1105)   acetaminophen (TYLENOL) tablet 975 mg (975 mg Oral Given 9/23/22 2254)   ondansetron (ZOFRAN ODT) ODT tab 8 mg (8 mg Oral Given 9/24/22 1001)            There were significant events during my shift.    Patient was signed out to the oncoming provider, Dr. Ac      Impression:    ICD-10-CM    1. Psychosis, unspecified psychosis type (H)  F29        Plan:    1. Awaiting inpatient mental health admission/transfer.      RESULTS:   No results found for this visit on 09/21/22 (from the past 24 hour(s)).          MD Deny Boyce Alda L, MD  09/24/22 9536

## 2022-09-24 NOTE — TELEPHONE ENCOUNTER
R:  22/Yasmeen/Blue Team    Updated Bed Search @ 4pm:  Per chart review, intake can look in the anywhere for placement    [facility] has 0 appropriate beds available. Phone: [phone]  [facility] posting 0 available beds. Phone: [phone]  [facility] posting 0 available beds. Phone: [phone]  [facility] posting 0 available beds. Phone: [phone]  [place] posting 0 available beds. Phone: [phone]  [facility] posting 0 available beds. Phone:[phone]  [facility] posting 0 available beds. Phone: [phone]  [facility] posting 0 available beds. Phone: [phone]  [place] posting 0 available beds. Phone: [phone]  [facility] posting 0 available beds. Phone: [phone]  [facility] posting 3 available beds. Phone: [phone]. Mixed unit, covid neg.  [place] posting 1 available beds. Phone: [phone]. No aggression, covid neg.  [facility] posting 0 available beds. Phone: [phone]  [facility] posting 0 available beds. Phone: [phone]  [place] posting 1 available beds. Phone: [phone]. Low acuity, covid neg.  [facility] posting 0 available beds. Phone:[phone]  [facility] posting 0 available beds. Phone: [phone]  [facility] posting 1 available beds. Phone:[phone]. 72 HH required, covid neg, low acuity only.  [facility] posting 2 available beds. Phone: [phone]  [facility] posting 0 available beds. Phone: [phone]  [facility] posting 0 available beds. Phone: [phone]  [facility] posting 7 available beds. Phone: [phone]. No CD, no aggression or violence hx.  [facility] posting 0 available beds. Phone: [phone]  [facility] posting 0 available beds. Phone: [phone]  [facility] posting 0 available beds. Phone: [phone]  [facility] posting 2 available beds. Phone: [phone]. Voluntary pts only, low acuity, mixed unit.  [facility] posting 0 available beds. Phone: [phone]  [facility]  [place] posting 0 available beds. Phone: [phone]  [facility] posting 14 available beds. Phone: [phone]  [facility] posting 0 available beds. Phone: [phone]    Pt remains on work list pending appropriate bed placement.    5:06 PM Paged Resident for unit 22    5:53 PM Paged Resident for unit 22    5:54 PM Eleanor accepted for 22/OGissel/Blue Team; Pt will need an SIO and a private room if available    Updated worklist; did Indicia; and added to the admit Board    6:13 PM Updated unit 22 and left message for Charge RN    6:17 PM Discussed Pt with uit 22 Charge RN    6:19 PM Updated [facility]

## 2022-09-25 ENCOUNTER — HEALTH MAINTENANCE LETTER (OUTPATIENT)
Age: 32
End: 2022-09-25

## 2022-09-25 PROCEDURE — 124N000002 HC R&B MH UMMC

## 2022-09-25 PROCEDURE — 250N000013 HC RX MED GY IP 250 OP 250 PS 637: Performed by: PSYCHIATRY & NEUROLOGY

## 2022-09-25 RX ORDER — AMOXICILLIN 250 MG
1 CAPSULE ORAL 2 TIMES DAILY PRN
Status: DISCONTINUED | OUTPATIENT
Start: 2022-09-25 | End: 2022-09-30 | Stop reason: HOSPADM

## 2022-09-25 RX ADMIN — OLANZAPINE 10 MG: 10 TABLET, ORALLY DISINTEGRATING ORAL at 20:15

## 2022-09-25 ASSESSMENT — ACTIVITIES OF DAILY LIVING (ADL)
ADLS_ACUITY_SCORE: 40
ADLS_ACUITY_SCORE: 40
DRESS: INDEPENDENT
DRESS: SCRUBS (BEHAVIORAL HEALTH);INDEPENDENT
HYGIENE/GROOMING: INDEPENDENT
ORAL_HYGIENE: INDEPENDENT
ORAL_HYGIENE: INDEPENDENT
HYGIENE/GROOMING: INDEPENDENT
ADLS_ACUITY_SCORE: 40
LAUNDRY: WITH SUPERVISION
ADLS_ACUITY_SCORE: 40

## 2022-09-25 NOTE — PLAN OF CARE
Problem: Sleep Disturbance (Psychotic Signs/Symptoms)  Goal: Improved Sleep (Psychotic Signs/Symptoms)  Outcome: Ongoing, Progressing  Intervention: Promote Healthy Sleep Hygiene  Flowsheets (Taken 9/25/2022 0100)  Sleep Hygiene Promotion: regular sleep pattern promoted  Goal Outcome Evaluation:  Plan of Care Reviewed With: patient     Patient slept 6.5 hours this shift. She was noted awake in bed during 15 minute checks and also went to the BR. No complaints of pain. No PRN medications given this shift.

## 2022-09-25 NOTE — DISCHARGE INSTRUCTIONS
Behavioral Discharge Planning and Instructions    Summary: You were admitted on 9/21/2022 due to symptoms concerning for ani and psychosis.  You were treated by Ammon Arana MD and discharged on 9/30/2022 from Station [facility] to Home    Main Diagnosis:   - Psychosis, unspecified psychosis type   - Rule out bipolar I disorder, current episode manic, with psychotic features  - Rule out catamenial psychosis    Health Care Follow-up:     Psychiatry Medication Management:   October 18, 2022 - 1:00pm -  Patrick Schoenecker APRN, CNP, PHN - virtual / video appointment   [facility]  - : [phone] Fax: [phone]  You will received an e-mail to the e-mail address you provided with a link and intake packet - please complete as soon as you are able.   It will be sent to the following e-mail: [email or web address]    Individual Therapy - during your appointment speak with your provider about individual therapy options.     Attend all scheduled appointments with your outpatient providers. Call at least 24 hours in advance if you need to reschedule an appointment to ensure continued access to your outpatient providers.     Major Treatments, Procedures and Findings:  You were provided with: a psychiatric assessment, assessed for medical stability, medication evaluation and/or management, group therapy, and milieu management    Symptoms to Report: increased confusion, losing more sleep, mood getting worse, or thoughts of suicide    Early warning signs can include: increased depression or anxiety sleep disturbances increased thoughts or behaviors of suicide or self-harm  increased unusual thinking, such as paranoia or hearing voices    Safety and Wellness:  Take all medicines as directed.  Make no changes unless your doctor suggests them.      Follow treatment recommendations.  Refrain from alcohol and non-prescribed drugs.     Resources:   Crisis Intervention: [phone] or [phone] (TTY: [phone]).   "Call anytime for help.  [facility] Crisis Response - Adult [phone]  Text 4 Life: txt \"LIFE\" to 60064 for immediate support and crisis intervention  Crisis text line: Text \"[place]\" to 353400. Free, confidential, 24/7.    [facility] Adult [facility] - [facility]   [phone]  [address] [place] [id number] Hours - Monday - Friday 8:00am- 5:30 pm  24/7 Mobile Crisis Teams and Phone Support - [phone]  Call this number for assistance at any time of the day or night, if needed a mobile team will come and meet with you in person.   You don t need insurance for this service.     General Medication Instructions:   See your medication sheet(s) for instructions.   Take all medicines as directed.  Make no changes unless your doctor suggests them.   Go to all your doctor visits.  Be sure to have all your required lab tests. This way, your medicines can be refilled on time.  Do not use any drugs not prescribed by your doctor.  Avoid alcohol.    Advance Directives:   Scanned document on file with [email or web address]? No scanned doc  Is document scanned? Pt unable to confirm  Honoring Choices Your Rights Handout: Informed and given  Was more information offered? Pt declined    The Treatment team has appreciated the opportunity to work with you. If you have any questions or concerns about your recent admission, you can contact the unit which can receive your call 24 hours a day, 7 days a week. They will be able to get in touch with a Provider if needed. The unit number is [phone] .  "

## 2022-09-25 NOTE — PLAN OF CARE
Initial Psychosocial Assessment    I have reviewed the chart, met with the patient, and developed Care Plan.  Information for assessment was obtained from:     Chart Review and Patient Interview    Presenting Problem:  Pt is admitted to [facility] 22 under the care of Ammon Arana MD.  Pt presented to the ED reporting significant insomnia.  She reports she did not sleep for 5 days.  She has been medication non-compliant since her discharge from [facility] in January 2022.    History of Mental Health and Chemical Dependency:   She was hospitalized in 1/2022 and 1/2020 for similar presentation    History of Post-Partum depression.    Family Description (Constellation, Family Psychiatric History):    Pt immigrated from [place] in 2016. She is . She has 9 month old child.  In 1/2021 she had a still born full term child.    Pt's nuclear family , Mother 2 adopted brothers and 5 sisters are in [place].  She has applied for visa status for her mother to immigrate.  She is the 3rd of 7 children.    Significant Life Events (Illness, Abuse, Trauma, Death):  None reported.    Living Situation:  Pt lives with her  and child in an apartment.    Educational Background:  Pt has a Bachelor's degree in Language.  She also has completed 1.5 years toward a Master's Degree in English.    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    Financial Status:  Pt's  has Part time work and she is not employed.  They receive WIC for their baby and have medical assistance.    Legal Issues:  No    Ethnic/Cultural Issues:  Armenian    Spiritual Orientation:  Pt has Hindu pre-occupations.  Claims she is Taoist.     Service History:  No    Social Functioning (organization, interests):  Reports she stays at home with her baby    Current Treatment Providers are:  Suspect she has had no health care since her discharge from [facility] in January 2022.      Social Service  Assessment/Plan:  The plan is to assess the patient for mental health and medication needs.  The patient will be prescribed medications to treat the identified symptoms.  Upon discharge the patient will be referred to services as appropriate based on the assessment.  Please encourage Pt to follow up with health care after her discharge.

## 2022-09-25 NOTE — PLAN OF CARE
Goal Outcome Evaluation:       09/24/22 1926   Patient Belongings   Did you bring any home meds/supplements to the hospital?  No   Patient Belongings locker   Patient Belongings Put in Hospital Secure Location (Security or Locker, etc.) cell phone/electronics;clothing   Belongings Search Yes   Clothing Search Yes   Second Staff Elvia Leóner:  White hoodie, black sweatpants, black pants, cream top, white tank, socks, book, phone, , toiletries (brush, lotion, body wash, deodorant, wipes)   A               Admission:  I am responsible for any personal items that are not sent to the safe or pharmacy.  [facility] is not responsible for loss, theft or damage of any property in my possession.    Signature:  _________________________________ Date: _______  Time: _____                                              Staff Signature:  ____________________________ Date: ________  Time: _____      2nd Staff person, if patient is unable/unwilling to sign:    Signature: ________________________________ Date: ________  Time: _____     Discharge:  [facility] has returned all of my personal belongings:    Signature: _________________________________ Date: ________  Time: _____                                          Staff Signature:  ____________________________ Date: ________  Time: _____

## 2022-09-25 NOTE — PLAN OF CARE
"  Patient has been pleasant, was seen in the milieu this shift. Patient did not attend group this shift. Patient denied SI/SIB/HI/AVH. Patient denied pain. Patient reported having large bowel movement this morning, denied constipation, stated that BM was normal, has no GI concerns. Patient's senna medication order was changed to PRN BID. Patient was seen ambulating self on unit and singing in her language, saying \"alllelouia\" at times. Patient is eating and drinking well. Patient reported feeling safe on unit, was seen engaged at times with peers. Patient was medication compliant, did asked writer at first about side effects, stated that if she took medication, she was going to have issues with sleep. Patient was educating on the importance of her medication and was also reminded that medication was also part of her treatment while she is here. Patient denied anxiety and depression. VSS.    Problem: Plan of Care - These are the overarching goals to be used throughout the patient stay.    Goal: Plan of Care Review/Shift Note  9/25/2022 1838 by Lia Ignacio RN  Outcome: Ongoing, Progressing  Flowsheets (Taken 9/25/2022 1838)  Plan of Care Reviewed With: patient  Overall Patient Progress: improving               "

## 2022-09-25 NOTE — PLAN OF CARE
Problem: Plan of Care - These are the overarching goals to be used throughout the patient stay.    Goal: Plan of Care Review/Shift Note  Description: The Plan of Care Review/Shift note should be completed every shift.  The Outcome Evaluation is a brief statement about your assessment that the patient is improving, declining, or no change.  This information will be displayed automatically on your shift note.  Recent Flowsheet Documentation  Taken 9/24/2022 1945 by Steffi Payne, RN  Plan of Care Reviewed With: patient  Goal: Absence of Hospital-Acquired Illness or Injury  Intervention: Identify and Manage Fall Risk  Recent Flowsheet Documentation  Taken 9/24/2022 1942 by Steffi Payne RN  Safety Promotion/Fall Prevention:    check orthostatic blood pressure    clutter free environment maintained    nonskid shoes/slippers when out of bed    safety round/check completed  Goal: Optimal Comfort and Wellbeing  Outcome: Ongoing, Progressing  Intervention: Provide Person-Centered Care  Recent Flowsheet Documentation  Taken 9/24/2022 1945 by Steffi Payne, RN  Trust Relationship/Rapport:    care explained    choices provided    emotional support provided    empathic listening provided    questions answered    reassurance provided    thoughts/feelings acknowledged  Goal: Readiness for Transition of Care  Intervention: Mutually Develop Transition Plan  Recent Flowsheet Documentation  Taken 9/24/2022 1927 by Steffi Payne, RN  Equipment Currently Used at Home: none  Goal Outcome Evaluation: No Change  Plan of Care Reviewed With: patient      Admission Note:    S: Admitted a 32 year old female from [facility] ED with a diagnosis of hyperthyroidism, bipolar disorder, and psychosis.     B: Per chart review, Ema Rojas is a 32 year old female with a PMH of hyperthyroidism, bipolar disorder and psychosis who presents to the ED today reporting mental health problem.  Patient denies suicidal ideation.   "She does not want to hurt herself or anyone else.  She denies current medical complaints.  She states she has not been taking her medications but she is not willing to take them.  She is willing to be admitted to psychiatry.  Patient's  is not present in the emergency department and states that she has not been taking her medications.  He says that she does not believe she has a mental illness.  She has not been sleeping.     Per [facility]: Today in the ED patient appears manic, psychotic and religiously preoccupied.  She has not been sleeping for the past 5 days.  She has been watching the Judaism channel on TV and is talking about \"the devil's work\".  Here in the ED she has been breaking out in hysterical laughter.  She believes that her father is in the moon and is talking to her from heave.  She has 2 prior admissions for psychosis related to bipolar disorder.  She states that she is on her medications and that her  gives them to her.  She is agreeable to admission.    A: Patient is Voluntary. She signed the Mental Health Consent for Treatment upon admission to the unit.     When patient arrived to the unit. She was alert and oriented x3. Appeared a little anxious but pleasant and cooperative with the body search performed by 2 female staff. No contra band found in patient's body. Vital signs stable. Denied pain when asked. Oriented to the unit. She already ate supper in the ED. She agreed to be interviewed by writer. Thus, Admission Profile was completed. Per pt, she was admitted here at the hospital because she feels depressed and anxious. Asked about her hallucinations but she denied them. Stated, \" I was just praying to God.\" She endorsed both depression and anxiety 5/10. Denied AVH and HI. Explained to her regarding SIO and she didn't want somebody watching her. Stated she will ask help when she needed something during the night. She said she feels safe here at the unit. She agreed to take her " bed time medication. Her  is aware of her admission to the unit.     Covid-19 swab test was negative on 9/21/22.  Utox  and HCG was negative on 9/22/22.    R: Mental health evaluation and treatment. Patient was placed on SIO 10 feet for severe intrusiveness upon admission but was discontinued.

## 2022-09-25 NOTE — H&P
"Psychiatry History and Physical    Ema Rojas MRN# [id number]   Age: 32 year old YOB: 1990     Date of Admission:  9/21/2022  Admitting Physician:                Dr. Remi Padilla          Contacts:     Primary Outpatient Psychiatrist: Parker Neri MD  Primary Physician: No Ref-Primary, Physician  Therapist: Tamika Jurado  Family Members:  Juana [phone]         Chief Complaint:     \"I'm ready to go now\"         History of Present Illness:     History obtained from patient and electronic chart    Bob Boo is a 32 years old patient who uses she/her pronouns brought to emergency room by EMS due to concern of ani. She was hospitalized at [facility] for ani, worsening psychosis from January 29, 2022 until February 7, 2022 discharged home with olanzapine 15 mg at bedtime.  Patient previously tried Abilify but he stopped taking by herself.     Per ED Note:   \"Ema Rojas is a 32 year old female who presents to us with a chief complaint of insomnia and left arm pain.  Describes her pain rating down her left arm that is a burning sensation.  No known injury.  Patient took 1 single 200 mg ibuprofen tablet which did not help with the pain.  No rash.  She also notes difficulty sleeping for the last several days.  Per chart review patient has a history of psychosis that exhibited initially with inability to sleep.  She was admitted and then when she was stabilized and discharged she did not continue any medication outpatient.  She is not currently suicidal or homicidal.  She and her  do not feel she needs to see a mental health  at this time.\"     Per DEC Note:   \"Pt brought to the ED by EMS.  Triage notes indicate that she was outside her home yelling at her . It is unclear who called 911.  In the ED pt appears to be manic. Her speech is pressured, rambling and tangential.  She was hyersexual grabbing at RN and other staff in the hallway.  She was moved to ED 1.   She is able " "to engage with writer for a brief period of time and then will break out laughing to the point of tears, pointing around the room and saying things that are not understood. She then pats the top of her head and runs her right hand down her left arm and claps, she repeats this several times.   When asked what brings her to the ED she says \"it is a complicated story.\"   She is religiously preoccupied and talks about being a Church and then proceeds to try and list off all the different types of Congregation's.    She is having trouble sleeping and says that she has pain in her head to her arm, she points to the top of her scalp and touches it to the tips of her fingers on her left hand. She talks about the \"word of God\" watching Buddhism channels on TV, not sleeping for 5 days, the moon, seeing her dad in the moon and from [facility].\"    ED/Hospital Course   In the ED, she was restarted on the olanzapine from her prior admission. \"She has difficulty remembering events that led to her current ED stay, \"I was talking about Vincent Jesús!\". Patient acknowledges she has not slept for \"many days\" and states she is \"the happiest lady in the world\". She goes on long tangents about \"pure love\" and \"Cheondoism booms\". She points at the lights and says \"kaboom! Kaboom!\". She was initially hyper verbal and disorganized, but those symptoms improved.  She was placed on a 72-hour hold.    She was medically cleared for admission to inpatient psychiatric unit.    Per patient report:    Ema Rojas reports that since her period started she has been experiencing symptoms including sleeplessness for 5 days, which led to her different behaviors and admission. She reports last being well last month. She attributes her symptoms & decompensation to too much stress related to the baby.  Prior to admission, she was \"praying too much\" and that too much of a good thing had disrupted her life.  She also had strange burning sensations in her left arm, " and thought it was due to a microchip having been implanted previously into that arm.  She now feels that she prays an appropriate amount for her geri, and that it was just medication injected into her arm, not microchips.  Patient endorsed symptoms of cesia including decreased need for sleep, irritability, goal-directed activities, and hyperverbal praying.  However, patient does not think that she has a mental illness and really wants to go home to take care of her son. She reports she has not been taking their psychiatric medications which include olanzapine. She last took these medications after her last hospital stay. She denies recent substance use. It is unclear if she followed up with her previously assigned outpatient psychiatric provider after her last admission in February.     She primary goal for this hospitalization is to get healthy to go back home.  She was previously on a hold but signed in voluntarily when she arrived on the unit.  She is agreeable to taking her olanzapine to prevent further hospitalizations.    The risks, benefits, alternatives and side effects have been discussed and are understood by the patient and other caregivers.         Psychiatric Review of Systems:     Depression:   Reports: depressed mood, decreased interest, changes in sleep, changes in appetite, helplessness, impaired concentration  Denies: suicidal ideation, decreased interest, hopelessness  Cesia:   Reports: sleeplessness,racing thoughts, increased goal-directed activities, grandiose delusions, increased religiousity  Denies:  impulsiveness (spending, driving recklessly, change in sexual activity), pressured speech  Psychosis:   Reports: auditory hallucinations of hearing music playing and the sound of God's voice, paranoia that she had microchips injected into her arm  Denies: visual hallucinations  Anxiety:   Reports: worries,  Denies: panic attacks (palpitations, diaphoresis, shortness of breath, sense of  impending doom), specific phobias.    PTSD:   Denies: re-experiencing past trauma, nightmares, increased arousal, avoidance of traumatic stimuli, impaired function.           Medical Review of Systems:     The Review of Systems is negative other than what is noted in the HPI         Psychiatric History:     Prior diagnoses: previous psychiatric diagnoses include postpartum psychosis/ani.     Hospitalizations: Patient was hospitalized at at [facility] from January 29 until February 2 for psychosis and ani. Also patient was hospitalized in November 2020, history of refusing medications as prescribed by psychiatry    Court Committments: None per     Suicide attempts: None per patient report. None per     Self-injurious behavior: None per patient report and     Guns: no    Violence: None per patient report and     ECT: None per chart review     TMS: None per patient report    Psychiatry Medication Trials:  Trazodone for sleep  Aripiprazole (discontinued by patient)  Olanzapine, patient stopped taking after last admission         Substance Use History:     Alcohol: none    Nicotine: none    Illicit Substances: none    Chemical Dependency Treatment: none         Social History:     Upbringing: grew up in [place]. She came to [place] in [place] to [place]. She moved to [place], [place] and then to [place].      Family/Relationships: She is  and has a 10-month old son named Javier.     Living Situation: apartment      Occupation: Stays at with her son.     Legal: Denies history of legal issues.     Abuse/Trauma: no known history of trauma     Service: None    Spirituality: Shinto     Hobbies/Interests: watches some Shinto TV channels.           Past Medical History:   Denies history of: head trauma with or without loss of consciousness and seizures    Past Medical History:   Diagnosis Date     Hyperthyroidism     she has seen endocrinology 03/2021     Iron deficiency anemia secondary to  "inadequate dietary iron intake 6/15/2020     Prior pregnancy with fetal demise 07/2020     Past Surgical History:   Procedure Laterality Date     NO HISTORY OF SURGERY            Allergies:    No Known Allergies       Medications:     No current outpatient medications on file.             Family History:   Psychiatric Family Hx: None known, per patient  None known, per family    Family History   Problem Relation Age of Onset     No Known Problems Mother      No Known Problems Father      No Known Problems Maternal Grandmother      No Known Problems Maternal Grandfather      No Known Problems Paternal Grandmother      No Known Problems Paternal Grandfather      No Known Problems Brother      No Known Problems Sister      No Known Problems Brother      No Known Problems Sister      No Known Problems Sister      No Known Problems Sister             Labs:   No results found for this or any previous visit (from the past 24 hour(s)).       Psychiatric Examination:   /71 (BP Location: Right arm, Patient Position: Sitting, Cuff Size: Adult Regular)   Pulse 71   Temp 97.3  F (36.3  C) (Temporal)   Resp 16   LMP 09/17/2022 (Exact Date)   SpO2 100%     Appearance:  awake, alert, dressed in hospital scrubs, appeared as age stated and well groomed  Attitude:  cooperative  Eye Contact:  good  Mood:  better \"I am ready to go\"  Affect:  appropriate and in normal range, mood congruent, intensity is exaggerated and restricted range  Speech:  clear, coherent and normal prosody  Psychomotor Behavior:  no evidence of tardive dyskinesia, dystonia, or tics  Thought Process:  logical and goal oriented  Associations:  no loose associations  Thought Content:  no evidence of suicidal ideation or homicidal ideation and auditory hallucinations present possibly  Insight:  limited, she can acknowledge some weird things were going on but denied having any mental health issues  Judgment:  limited, focused on getting home to her baby "   Oriented to:  time, person, and place  Attention Span and Concentration:  intact  Recent and Remote Memory:  intact  Language:  English with appropriate syntax and vocabulary  Fund of Knowledge: appropriate  Muscle Strength and Tone: normal  Gait and Station: Normal         Physical Exam:     See ED assessment note by ED physician on  9/18.        Assessment   Ema Rojas is a 32 year old  female with a past psychiatric history of psychosis unspecified who presented to the ED by ambulance with out of control behaviors, psychosis and ani in the context of recent menstrual period causing sleeplessness. Significant symptoms include depressed, mood lability, sleep issues, psychosis, disorganization and impulsive. Her last psychiatric hospitalization was in February 2022.  Current psychosocial stressors include chronic mental health issues, family dynamics and caring for her baby which she has been coping with by using substances and acting out to others.  Patient's support system includes family and community.  Substance use does not appear to be playing a contributing role in the patient's presentation.  There is genetic loading for none known. Medical history does not appear to be significant for obesity, in utero exposure and thyroid disorder.  Psychologically, Ema is very hesistant to acknowledge she has a mental illness due to a fear it will impact her ability to leave the hospital or care for her son. Socially, it seems like their family has a lot of support, and Ema could use some of it if she can communicate their needs. The MSE is notable for normal speech rate and volume after reported hyperverbal state in the ED. She denies self injurious behaviors. Her definitive diagnosis is still in evolution; differential includes peripartum psychosis, recurrent; bipolar affective disorder type 1, catamenial psychosis.  Additionally, she has traits of denial which interfere with her ability to adhere with  the treatment plan.  Optimization of medications to target these symptom clusters in addition to evaluation of adquate outpatient supports will be targets for treatment during this admission.     The patient had a similar episode in the immediate postpartum period in 2020 after her first baby . She improved rapidly on 5mg Abilify and she discharged within 5 days on Abilify 5mg + trazodone 50mg.   reports she stopped taking her medications as soon as she got home. Interestingly, in spite of non-adherence,  and patient both deny and known psychotic symptoms or mood episodes in between hospitalizations, prior to their onset again 5 days ago. No particular psychosocial stressor was identified apart from giving birth to her second child in . It seems that most of her mental health crises occur around hormonal changes in Ebise's body, whether after a period or birth of a child. 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 Disposition pending clinical stabilization, medication optimization and development of an appropriate discharge plan.    Risk for harm is moderate.  Risk factors: impulsive and past behaviors  Protective factors: family     Principal psychiatric diagnosis:   - Psychosis, unspecified psychosis type (H)    Secondary psychiatric diagnoses:   - Rule out bipolar I disorder, current episode manic, with psychotic features  - Rule out catamenial psychosis          Plan     Admit to Unit 22 with Attending Physician Dr. Yasmeen M.D.    Medications:   Outpatient medications held:     none    Outpatient medications continued:   none    New medications initiated:   Olanzapine 10 mg ODT, po at bedtime, for psychosis    Hospital PRNs as ordered:  acetaminophen, alum & mag hydroxide-simethicone, hydrOXYzine, melatonin, OLANZapine **OR**  "OLANZapine, ondansetron, senna-docusate    Medications: risks/benefits discussed with patient    Patient will be treated in therapeutic milieu with appropriate individual and group therapies.    Laboratory/Imaging:  - COMP, CBC, TSH, lipids WNL    Legal Status:   Orders Placed This Encounter      Legal status Voluntary      Safety Assessment:    Behavioral Orders   Procedures     Code 1 - Restrict to Unit     Routine Programming     As clinically indicated     Status 15     Every 15 minutes.      Pt has not required locked seclusion or restraints in the past 24 hours to maintain safety, please refer to RN documentation for further details.    Consults:  - none    Medical diagnoses to be addressed this admission:     #. Constipation  - Endorsed no BM in last 5 days  - Started scheduled senna-docusate BID    #. Nausea, resolved  - Ondansetron 8 mg po PRN Q6H       Dispo: unknown pending medication management and clinical stabilization    Patient to be staffed with the attending physician in the morning.    -------------------------------------------------------  Johann Lazo MD  PGY-2 Psychiatry Resident    Psychiatry Attending Attestation:    I, Erwin Padilla, have personally performed an examination of this patient and I have reviewed the resident's documentation.    I have edited the note to reflect all relevant changes.    I have discussed this patient with the house staff on 09/25/22.     S: patient described worsening anxiety, insomnia, depression linked to menstrual cycle. Said she wanted to go home and was feeling \"ok\" but could not explain this change or what led to the crisis in the first place. Expressed concern about not having family or other support for childcare so that  could go to work while patient was in hospital.         O:  Vitals and labs reviewed. VS wnl, mild hypokalemia, will redraw.      MSE:   A&Ox3, fair EC, fluent speech, no adventitious movements,  mood \"better\",   " Affect full range, denies SI/HI/AVH, insight fair to poor, judgement fair to poor.      A:  Differential is MDD w/ psychosis, bipolar spectrum illness, both with premenstrual exaggeration.      Psychiatric Diagnoses:  Psychosis NOS    Non-Psychiatric Diagnoses:  Constipation      P:  Further diagnostic clarification, continue olanzapine        Erwin Padilla MD, PhD

## 2022-09-25 NOTE — PLAN OF CARE
"Goal Outcome Evaluation:    Plan of Care Reviewed With: patient        Problem: Mood Impairment (Psychotic Signs/Symptoms)  Goal: Improved Mood Symptoms (Psychotic Signs/Symptoms)  Outcome: Ongoing, Progressing  Intervention: Optimize Emotion and Mood  Recent Flowsheet Documentation  Taken 9/25/2022 0900 by Julissa Glez RN  Supportive Measures:    active listening utilized    positive reinforcement provided    self-care encouraged    self-reflection promoted  Diversional Activity:    journaling    music     Patient out for breakfast and ate 75% of breakfast. Appears pleasant and bright. Could be seen interacting with peers at breakfast table. On assessment patient appeared dismissive and was saying \"I'm okay, I'm okay.\" She denied mood dysregulation and any dysfunctional thought. Patient stated that she remembered what happened before she came in and then added that she was okay now. Patient demonstrates concrete thought but appears mildly confused and forgetful at times evidenced by asking multiple staff where her bathroom was forgetting that she has a bathroom in her room. She was given personal hygiene supplies this morning. Patient came back to request for body lotion forgetting that RN gave her one an hour earlier.  Patient was concerned about her 10 month old baby at home and would like to see her baby. She is also worried about her  staying home to look after the baby. Patient asked if her  could be given Family Leave by her providers because he is taking care of their baby. Patient encouraged to talked to her provider and  about that. Patient refused her morning medication, Senna stating that she did not need it. Patient intermittently paced on the hallway. Observed singing while pacing back and forth on the hallway.            "

## 2022-09-26 LAB
BASOPHILS # BLD AUTO: 0 10E3/UL (ref 0–0.2)
BASOPHILS NFR BLD AUTO: 1 %
EOSINOPHIL # BLD AUTO: 0.2 10E3/UL (ref 0–0.7)
EOSINOPHIL NFR BLD AUTO: 3 %
ERYTHROCYTE [DISTWIDTH] IN BLOOD BY AUTOMATED COUNT: 13.6 % (ref 10–15)
HCT VFR BLD AUTO: 38.1 % (ref 35–47)
HGB BLD-MCNC: 12.3 G/DL (ref 11.7–15.7)
IMM GRANULOCYTES # BLD: 0 10E3/UL
IMM GRANULOCYTES NFR BLD: 0 %
LYMPHOCYTES # BLD AUTO: 2 10E3/UL (ref 0.8–5.3)
LYMPHOCYTES NFR BLD AUTO: 39 %
MCH RBC QN AUTO: 27.6 PG (ref 26.5–33)
MCHC RBC AUTO-ENTMCNC: 32.3 G/DL (ref 31.5–36.5)
MCV RBC AUTO: 86 FL (ref 78–100)
MONOCYTES # BLD AUTO: 0.4 10E3/UL (ref 0–1.3)
MONOCYTES NFR BLD AUTO: 9 %
NEUTROPHILS # BLD AUTO: 2.5 10E3/UL (ref 1.6–8.3)
NEUTROPHILS NFR BLD AUTO: 48 %
NRBC # BLD AUTO: 0 10E3/UL
NRBC BLD AUTO-RTO: 0 /100
PLATELET # BLD AUTO: 221 10E3/UL (ref 150–450)
RBC # BLD AUTO: 4.45 10E6/UL (ref 3.8–5.2)
WBC # BLD AUTO: 5.1 10E3/UL (ref 4–11)

## 2022-09-26 PROCEDURE — H2032 ACTIVITY THERAPY, PER 15 MIN: HCPCS

## 2022-09-26 PROCEDURE — 99233 SBSQ HOSP IP/OBS HIGH 50: CPT | Mod: GC | Performed by: PSYCHIATRY & NEUROLOGY

## 2022-09-26 PROCEDURE — 124N000002 HC R&B MH UMMC

## 2022-09-26 PROCEDURE — 250N000013 HC RX MED GY IP 250 OP 250 PS 637: Performed by: PSYCHIATRY & NEUROLOGY

## 2022-09-26 PROCEDURE — 85004 AUTOMATED DIFF WBC COUNT: CPT | Performed by: STUDENT IN AN ORGANIZED HEALTH CARE EDUCATION/TRAINING PROGRAM

## 2022-09-26 PROCEDURE — 36415 COLL VENOUS BLD VENIPUNCTURE: CPT | Performed by: STUDENT IN AN ORGANIZED HEALTH CARE EDUCATION/TRAINING PROGRAM

## 2022-09-26 PROCEDURE — 250N000013 HC RX MED GY IP 250 OP 250 PS 637: Performed by: STUDENT IN AN ORGANIZED HEALTH CARE EDUCATION/TRAINING PROGRAM

## 2022-09-26 RX ADMIN — SENNOSIDES AND DOCUSATE SODIUM 1 TABLET: 50; 8.6 TABLET ORAL at 09:00

## 2022-09-26 RX ADMIN — OLANZAPINE 10 MG: 10 TABLET, ORALLY DISINTEGRATING ORAL at 21:28

## 2022-09-26 ASSESSMENT — ACTIVITIES OF DAILY LIVING (ADL)
ADLS_ACUITY_SCORE: 40
LAUNDRY: WITH SUPERVISION
ADLS_ACUITY_SCORE: 40
LAUNDRY: WITH SUPERVISION
ADLS_ACUITY_SCORE: 40
HYGIENE/GROOMING: INDEPENDENT
ORAL_HYGIENE: INDEPENDENT
ORAL_HYGIENE: INDEPENDENT
ADLS_ACUITY_SCORE: 40
DRESS: INDEPENDENT
ADLS_ACUITY_SCORE: 40
DRESS: INDEPENDENT
ADLS_ACUITY_SCORE: 40
HYGIENE/GROOMING: INDEPENDENT
ADLS_ACUITY_SCORE: 40

## 2022-09-26 NOTE — PROGRESS NOTES
"Patient agitated and loud on the phone while talking to her . Patient was redirected. Patient's  called and talked to RN. Said that his wife Ema was having difficult time because she wants to come home and take care of their baby. RN asked him of his opinion. Mr. Chester said he will support Ema's provider's decision about her discharge. RN had a one to one talk with Ema. She calmed down.   Patient saw her providers but became emotional after realizing that she will not be discharged today. patient claimed \"I am not sick. Nothing is wrong with me.\" Patient is concerned about her son and wants to talk to her mother on a long distance. RN talked to provider about order to have patient use personal phone for long distance call. Provider agreed to place order. Patient wants something to do, clean, work on computer. Patient was allowed to clean table once to keep her occupied. Was provider with word search and crayons. RN tried to administer PRN anxiety medication but patient declined. Emotional suport provided. Will continue to support patient and maintain safety.  "

## 2022-09-26 NOTE — PROGRESS NOTES
SPIRITUAL HEALTH SERVICES  SPIRITUAL ASSESSMENT Progress Note  [facility] ([facility]) Station 22     REFERRAL SOURCE: I did try to visit patient Ebise per basic hospital  visit. However, pt was not available for the  visit at this moment. I informed to the unit staff about it.    PLAN: I will remain open to provide spiritual care for the pt as needed.    Kwesi Davis M.Div. ([place]lem), M.Th., D.Min., [facility]  Staff   Pager [phone]

## 2022-09-26 NOTE — PLAN OF CARE
"Goal Outcome Evaluation:    Plan of Care Reviewed With: patient     Problem: Plan of Care - These are the overarching goals to be used throughout the patient stay.    Goal: Optimal Comfort and Wellbeing  Outcome: Ongoing, Progressing     Problem: Plan of Care - These are the overarching goals to be used throughout the patient stay.    Goal: Readiness for Transition of Care  Outcome: Ongoing, Progressing     Problem: Behavior Regulation Impairment (Psychotic Signs/Symptoms)  Goal: Improved Behavioral Control (Psychotic Signs/Symptoms)  Outcome: Ongoing, Progressing      Patient observed to be pleasant and appropriately interactive on the unit. Observed to be very slow eating her breakfast. When asked patient stated that she takes her time to eat because is a slow eater. On assessment she denied all mental health symptoms. Patient asked to explain the difference between how she feels now and when she came in. Patient stated that when she came she was confused \"But now I know everything.\" Patient denied having short term memory loss. Patient later approached RN to complain of poor appetite stating that she did not eat much. She also added that she would like to be discharged today. She complained of constipation \"My stool is hard.\" Patient agreed to take PRN Senna which she received at 0900.   Patient was encouraged to fill out her menu for tomorrow. Patient partially filled out her menu. Staff asked if she could fill out her menu completely. Patient showed some irritability and firmly said \"I am going home today. I am here for no reason.\" Patient stated that she was not supposed to be here. She resumed pacing and singing. No sustained outburst noted.            "

## 2022-09-26 NOTE — PLAN OF CARE
Assessment/Intervention/Current Symptoms and Care Coordination  - chart review  - team meeting  - Behavioral Team Discussion Note completed for plan of care     Discharge Plan or Goal  Pending stabilization & development of a safe discharge plan.    Barriers to Discharge   Patient requires further psychiatric stabilization due to current symptomology    Referral Status        Legal Status   voluntary

## 2022-09-26 NOTE — PLAN OF CARE
09/26/22 1550   Individualization/Patient Specific Goals   Patient Personal Strengths ability to maintain sobriety;independent living skills;motivated for recovery;motivated for treatment;family/social support;parenting skills;positive attitude   Patient Vulnerabilities history of unsuccessful treatment;lacks insight into illness   Interprofessional Rounds   Participants CTC;nursing;psychiatrist   Team Discussion   Progress initial team meeting   Anticipated length of stay assessment ongoing   Continued Stay Criteria/Rationale treatment of symptoms of ani ongoing   Medical/Physical no acute concerns   Precautions see below   Plan Psychiatric assessment/Medication management. Therapeutic Milieu. Individual care planning and after care planning. Patient to participate in unit groups and activities. Individual and group support on unit.   Rationale for change in precautions or plan per ongoing assessment   Safety Plan preacuations while on unit   Anticipated Discharge Disposition home with family     PRECAUTIONS AND SAFETY    Behavioral Orders   Procedures    Code 1 - Restrict to Unit    Routine Programming     As clinically indicated    Status 15     Every 15 minutes.       Safety  Safety WDL: WDL  [place] [facility] [place] Behavior: calm, sitting  Observed Behavior (Comment): calm  Safety Measures: safety rounds completed  Diversional Activity: journaling, music  Suicidality: Status 15

## 2022-09-26 NOTE — PLAN OF CARE
Problem: Sleep Disturbance (Depressive Signs/Symptoms)  Goal: Improved Sleep (Depressive Signs/Symptoms)  Recent Flowsheet Documentation  Taken 9/26/2022 0041 by Steffi Payne RN  Mutually Determined Action Steps (Improved Sleep): sleeps 4-6 hours at night  Intervention: Promote Healthy Sleep Hygiene  Recent Flowsheet Documentation  Taken 9/26/2022 0041 by Steffi Payne RN  Sleep Hygiene Promotion: regular sleep pattern promoted       Patient slept 6.75 hours this shift. No behavioral concerns noted this shift. No complaints of pain nor discomfort. No PRN medications given.

## 2022-09-26 NOTE — PROGRESS NOTES
----------------------------------------------------------------------------------------------------------  [facility]  Psychiatric Progress Note  Hospital Day #2    Ema Rojas MRN# [id number]   Age: 32 year old YOB: 1990   Date of Admission: 9/21/2022     Subjective   The patient was discussed with the treatment team and chart notes were reviewed.      Identifier:   Bob Boo is a 32 years old patient who uses she/her pronouns brought to emergency room by EMS due to concern of ani. She was hospitalized at [facility] for ani, worsening psychosis from January 29, 2022 until February 7, 2022 discharged home with olanzapine 15 mg at bedtime.  Patient previously tried Abilify but he stopped taking by herself. 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     Sleep:  6.75 hours (09/26/22 0600)  Prescribed Medications: Taken as prescribed  PRN Psychiatric Medications: acetaminophen, alum & mag hydroxide-simethicone, hydrOXYzine, melatonin, OLANZapine **OR** OLANZapine, ondansetron, senna-docusate     WeekendNursing Notes/Staff Report:  Did well over the weekend, no acute events. Nursing did note that she was very concerned about her baby, denied any SI/Hi over the weekend. Staff also noted that she seemed very confused and seems to have difficult with short term memory loss and cognition. For example , she did not know that there was a bathroom in her room.     Patient interview:  Ema Rojas states that they feel better about being in the hospital today . She is said she has been taking her meds and repeatedly asked when she will be discharged. She was very concerned about not being with her baby and that there is no one to care for her baby because her  is working. She noted that her periods have been irregular and that may have caused her current symptoms. She said that she has  "been anxious and down and the ED brought her here. She denied any SI/HI and repeatedly told the team she would take is doing better and will take her medications. 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 We encouraged her to go to groups, but she said that is not enough. She was very anxious and asked her if we could get her some hydroxyzine, she was ok with this but again asked to be discharged.  When I passed her in the hallway on 2 occasions later she pulled me aside and asked anxiously and persistently asked when she will be discharged and informed me that she needs to get back to her baby. I attempted to validate her concerns and informed that we would like her to be more stable on these medications before we send her home. I told her I would try and talk to her  today. I tried called her  later in the afternoon/early evening and could not get a hold of him.     Talked to Ebise in the evening while she was watching football. I told her I was unable to get a hold of her  but I will try again tomorrow. She said he was probably busy with the baby and working. She again asked when she can leave and I explained similarly to as noted above, she was on board with the plan this time.      ROS   ROS was negative unless noted above.     Objective   Vitals:  Temp: 97.5  F (36.4  C) (Temp  Min: 97.5  F (36.4  C)  Max: 97.8  F (36.6  C))    (No data recorded)  SpO2: 100 % (SpO2  Min: 100 %  Max: 100 %)  Pulse: 78 (Pulse  Min: 56  Max: 78)  BP: 118/81  Systolic (24hrs), Av , Min:113 , Max:118   Diastolic (24hrs), Av, Min:75, Max:81    Mental Status Examination:  Oriented: Grossly Oriented   Appearance:  awake, alert, appeared as age stated and well groomed  Attitude:  very anxious, more defensive and confrontational, no aggression appreciated   Eye Contact:  good  Mood: \" Doing better\", \" ready to leave\"  Affect:  " increased lability. Pt initially calm, then speech became a little more pressured, attitude more confrontational and more anxious  Speech:  clear, coherent and normal prosody, increased volume later in encounter, and some what pressured-some difficulty interrupting   Psychomotor Behavior:  no evidence of tardive dyskinesia, dystonia, or tics  Thought Process:  No hallucinations, delusions appreciated   Associations:  no loose associations  Thought Content:  no evidence of suicidal ideation or homicidal ideation and auditory hallucinations present possibly; pre-occupied with baby and leaving  Insight:  limited, does not seem to understand why she needs to stay here   Judgment:  limited, focus on baby-not on current state and hospitalization  Attention Span and Concentration:  intact  Recent and Remote Memory:  intact  Language:  English with appropriate syntax and vocabulary  Fund of Knowledge: appropriate  Muscle Strength and Tone: normal  Gait and Station: Normal     Allergies   No Known Allergies     Labs & Imaging     Results for orders placed or performed during the hospital encounter of 09/21/22 (from the past 24 hour(s))   CBC with Platelets & Differential    Narrative    The following orders were created for panel order CBC with Platelets & Differential.  Procedure                               Abnormality         Status                     ---------                               -----------         ------                     CBC with platelets and d...[[id number]]                      Final result                 Please view results for these tests on the individual orders.   CBC with platelets and differential   Result Value Ref Range    WBC Count 5.1 4.0 - 11.0 10e3/uL    RBC Count 4.45 3.80 - 5.20 10e6/uL    Hemoglobin 12.3 11.7 - 15.7 g/dL    Hematocrit 38.1 35.0 - 47.0 %    MCV 86 78 - 100 fL    MCH 27.6 26.5 - 33.0 pg    MCHC 32.3 31.5 - 36.5 g/dL    RDW 13.6 10.0 - 15.0 %    Platelet Count 221 150 -  450 10e3/uL    % Neutrophils 48 %    % Lymphocytes 39 %    % Monocytes 9 %    % Eosinophils 3 %    % Basophils 1 %    % Immature Granulocytes 0 %    NRBCs per 100 WBC 0 <1 /100    Absolute Neutrophils 2.5 1.6 - 8.3 10e3/uL    Absolute Lymphocytes 2.0 0.8 - 5.3 10e3/uL    Absolute Monocytes 0.4 0.0 - 1.3 10e3/uL    Absolute Eosinophils 0.2 0.0 - 0.7 10e3/uL    Absolute Basophils 0.0 0.0 - 0.2 10e3/uL    Absolute Immature Granulocytes 0.0 <=0.4 10e3/uL    Absolute NRBCs 0.0 10e3/uL       Trending Labs: (lithium levels, ANC, etc.)      Assessment   Diagnostic Impression:  Ema Rojas is a 32 year old  female with a past psychiatric history of psychosis unspecified who presented to the ED by ambulance with out of control behaviors, psychosis and ani in the context of recent menstrual period causing sleeplessness. Significant symptoms include depressed, mood lability, sleep issues, psychosis, disorganization and impulsive. Her last psychiatric hospitalization was in February 2022.  Current psychosocial stressors include chronic mental health issues, family dynamics and caring for her baby which she has been coping with by using substances and acting out to others.  Patient's support system includes family and community.  Substance use does not appear to be playing a contributing role in the patient's presentation.  There is genetic loading for none known. Medical history does not appear to be significant for obesity, in utero exposure and thyroid disorder.  Psychologically, Ema is very hesistant to acknowledge she has a mental illness due to a fear it will impact her ability to leave the hospital or care for her son. Socially, it seems like their family has a lot of support, and Ema could use some of it if she can communicate their needs. The MSE is notable for normal speech rate and volume after reported hyperverbal state in the ED. She denies self injurious behaviors. Her definitive diagnosis is still in  evolution; differential includes peripartum psychosis, recurrent; bipolar affective disorder type 1, catamenial psychosis.  Additionally, she has traits of denial which interfere with her ability to adhere with the treatment plan.  Optimization of medications to target these symptom clusters in addition to evaluation of adquate outpatient supports will be targets for treatment during this admission.      The patient had a similar episode in the immediate postpartum period in 2020 after her first baby . She improved rapidly on 5mg Abilify and she discharged within 5 days on Abilify 5mg + trazodone 50mg.   reports she stopped taking her medications as soon as she got home. Interestingly, in spite of non-adherence,  and patient both deny and known psychotic symptoms or mood episodes in between hospitalizations, prior to their onset again 5 days ago. No particular psychosocial stressor was identified apart from giving birth to her second child in . It seems that most of her mental health crises occur around hormonal changes in Ebise's body, whether after a period or birth of a child. 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 Disposition pending clinical stabilization, medication optimization and development of an appropriate discharge plan.    22: Still showing limited insight and judgment into current presentation. Focused on returning home to baby. Other wise seems to be doing well, not as hyper verbal, no hallucinations/delusions/SI/HI appreciated on encounter today. Will continue with current medication regimen.       Principal psychiatric diagnosis:   - Psychosis, unspecified psychosis type (H)     Secondary psychiatric diagnoses:   - Rule out bipolar I disorder, current episode manic, with psychotic features  - Rule out catamenial  psychosis    Psychiatric course:  Ema Rojas was admitted to [facility] as a voluntary patient on a 72 hour hold. her PTA  were continued. PTA olanzapine (though pt not adherent with this) continued.  Olanzapine 10 mg ODT, po at bedtime, for psychosis.     Ema Rojas continued to meet criteria for inpatient hospitalization medication optimization, inpatient stabilization, and appropriate discharge planning.     Medical course:   Ema Rojas was physically examined by the ED prior to being transferred to the unit and was found to be medically stable and appropriate for admission.      Plan   Today's Changes/Updates:     No changes today    Touch base with      FMLA paperwork tomorrow  _______________________________________________________________________  Psychiatric diagnoses and management:  Principal psychiatric diagnosis:   - Psychosis, unspecified psychosis type (H)     Secondary psychiatric diagnoses:   - Rule out bipolar I disorder, current episode manic, with psychotic features  - Rule out catamenial psychosis        Additional Planning:    Continue to monitor for and stabilize: psychosis, ani     Patient will be treated in therapeutic milieu with appropriate individual and group therapies as described.    Scheduled Medications (summary):  Current Facility-Administered Medications   Medication Dose Route Frequency     OLANZapine zydis  10 mg Oral At Bedtime       PRN Medications (summary):  Current Facility-Administered Medications   Medication Dose Route Frequency     acetaminophen  650 mg Oral Q4H PRN     alum & mag hydroxide-simethicone  30 mL Oral Q4H PRN     hydrOXYzine  25 mg Oral Q4H PRN     melatonin  3 mg Oral At Bedtime PRN     OLANZapine  10 mg Oral TID PRN    Or     OLANZapine  10 mg Intramuscular TID PRN     ondansetron  4-8 mg Oral Q8H PRN     senna-docusate  1 tablet Oral BID PRN       Discontinued Medications (& Rationale):    None    Consults:    None at this time.     Legal  Status:    Initially 72 hr hold-> Voluntary     SIO:    N    Pt has not required locked seclusion or restraints in the past 24 hours to maintain safety, please refer to RN documentation for further details.    Disposition:    TBD, pending clinical stabilization, medication optimization, and formulation of a safe discharge plan.    Safety Assessment:   Behavioral Orders   Procedures     Code 1 - Restrict to Unit     Routine Programming     As clinically indicated     Status 15     Every 15 minutes.      ____________________________________________________________________  Pertinent Medical diagnoses and management:  #. Constipation  - Endorsed no BM in 5 days prior to admission.   - Started scheduled senna-docusate BID     #. Nausea, resolved  - Ondansetron 8 mg po PRN Q6H  ____________________________________________________________________  Patient seen and discussed with attending physician, Dr. Ammon Rivers, who is in agreement with my assessment and plan.    Angelita Wallis MD  PGY-1 Psychiatry Resident    Attending Attestation:  This patient has been seen and evaluated by me, Ammon Arana.  I have discussed this patient with the psychiatry resident and I agree with the findings and plan in this note.    I have reviewed today's vital signs, medications, labs and imaging.     Ammon Rivers MD on 9/26/2022 at 10:42 PM

## 2022-09-27 PROCEDURE — G0177 OPPS/PHP; TRAIN & EDUC SERV: HCPCS

## 2022-09-27 PROCEDURE — H2032 ACTIVITY THERAPY, PER 15 MIN: HCPCS

## 2022-09-27 PROCEDURE — 124N000002 HC R&B MH UMMC

## 2022-09-27 PROCEDURE — 250N000013 HC RX MED GY IP 250 OP 250 PS 637: Performed by: PSYCHIATRY & NEUROLOGY

## 2022-09-27 PROCEDURE — 99233 SBSQ HOSP IP/OBS HIGH 50: CPT | Mod: GC | Performed by: PSYCHIATRY & NEUROLOGY

## 2022-09-27 RX ADMIN — OLANZAPINE 10 MG: 10 TABLET, ORALLY DISINTEGRATING ORAL at 21:18

## 2022-09-27 ASSESSMENT — ACTIVITIES OF DAILY LIVING (ADL)
HYGIENE/GROOMING: INDEPENDENT
LAUNDRY: WITH SUPERVISION
ADLS_ACUITY_SCORE: 40
ADLS_ACUITY_SCORE: 40
ORAL_HYGIENE: INDEPENDENT
ORAL_HYGIENE: INDEPENDENT
ADLS_ACUITY_SCORE: 40
DRESS: INDEPENDENT
ADLS_ACUITY_SCORE: 40
HYGIENE/GROOMING: INDEPENDENT
LAUNDRY: WITH SUPERVISION
DRESS: INDEPENDENT
ADLS_ACUITY_SCORE: 40

## 2022-09-27 NOTE — PLAN OF CARE
Goal Outcome Evaluation:    Plan of Care Reviewed With: patient     Problem: Plan of Care - These are the overarching goals to be used throughout the patient stay.    Goal: Optimal Comfort and Wellbeing  Outcome: Ongoing, Progressing     Problem: Behavior Regulation Impairment (Psychotic Signs/Symptoms)  Goal: Improved Behavioral Control (Psychotic Signs/Symptoms)  Outcome: Ongoing, Progressing      Patient reported epistasis when she woke up for breakfast. Blood stains on the kleenex from left nostril noted. RN checked her vital signs and they were all within normal limit. Cold washcloth provided for her. No actual bleeding, just trace of blood in the kleenex. Patient was pleasant and bright this morning. Observed to be interactive with peers and staff. Patient denied anxiety and depression. She denied SI/HI and hallucinations. Attended group therapies Patient's  called and said patient's mother is worried because she has not spoken to her recently. RN informed him that patient has order to make the long distance call using her personal phone. Patient spoke to her  after which she requested to use her phone for long distance call. Patient's mood remains bright after the call with her . Patient's phone plugged to charge before she could make a call. No outburst and no singing noted this morning. Patient spoke to her mother later. Appeared happy.

## 2022-09-27 NOTE — PROGRESS NOTES
09/27/22 1603   Group Therapy Session   Group Attendance attended group session   Time Session Began 1015   Time Session Ended 1100   Total Time patient participated (minutes) 10   Total # Attendees 4   Group Type life skill   Group Topic Covered balanced lifestyle;cognitive activities;self-care activities;structured socialization;problem-solving;coping skills/lifestyle management   Group Session Detail Exercise and Cognitive Jenga for large motor exercise, cognitive processing, concentration, problem solving, reality based activity, mood stabilization, coping, encouraging a healthy daily routine, self care, and socialization   Patient Response Detail Pt joined group for the final 5-10 min only.  Pt was pleasant, caught on to the activity and participated actively in the group for the short time she was present.  Pt was pleasant, polite and responded verbally upon direct approach.

## 2022-09-27 NOTE — PLAN OF CARE
Problem: Behavior Regulation Impairment (Psychotic Signs/Symptoms)  Goal: Improved Behavioral Control (Psychotic Signs/Symptoms)  Outcome: Ongoing, Progressing     Problem: Decreased Participation and Engagement (Psychotic Signs/Symptoms)  Goal: Increased Participation and Engagement (Psychotic Signs/Symptoms)  Outcome: Ongoing, Progressing     Problem: Mood Impairment (Psychotic Signs/Symptoms)  Goal: Improved Mood Symptoms (Psychotic Signs/Symptoms)  Outcome: Ongoing, Progressing     Pt's VSS in this shift. Denies pain. Pt denies SI/SIB/HI/hallucinations. Denies anxiety and depression when asked by this writer. Pt stated that she is feeling better and getting more sleep since she came here. Pt mentioned that she misses her 10 months old baby. Pt had bright affect on approach and cooperative with this writer. Pt paces in a hallway for some time and later spent time in a milieu watching TV with peers. Pt ate dinner on time. No PRN medication was requested or given during this shift. Pt is compliant with meds.    Pt has a new order that she is OK to use cell phone to [place].       Goal Outcome Evaluation:    Plan of Care Reviewed With: patient

## 2022-09-27 NOTE — PROGRESS NOTES
"SPIRITUAL HEALTH SERVICES  SPIRITUAL ASSESSMENT Progress Note  [facility] ([facility]     REFERRAL SOURCE: I did visit this afternoon patient Ebise per follow up  visit. I tried to visit the pt yesterday but pt wasn't available. I got a chance to talk with the pt today and I introduced myself as the unit  and shared all the info about the [facility]. Pt appreciated my presence and willing to provide spiritual care for her as long as she stays in the unit she is in. Pt said, \"I am okay but if I need your support, I will let you know. I thank you for stopping by to visit me.\" Pt knows how to get a hold of the  when she needed.    PLAN: I will remain open to provide spiritual care for the pt as requested.    Kwesi Davis M.Div. ([place]), M.Th., D.Min., [facility]  Staff   Pager [phone]    "

## 2022-09-27 NOTE — PROGRESS NOTES
----------------------------------------------------------------------------------------------------------  [facility]  Psychiatric Progress Note  Hospital Day #3    Ema Rojas MRN# [id number]   Age: 32 year old YOB: 1990   Date of Admission: 9/21/2022     Subjective   The patient was discussed with the treatment team and chart notes were reviewed.      Identifier:   Bob Boo is a 32 years old patient who uses she/her pronouns brought to emergency room by EMS due to concern of ain. She was hospitalized at [facility] for ani, worsening psychosis from January 29, 2022 until February 7, 2022 discharged home with olanzapine 15 mg at bedtime.  Patient previously tried Abilify but he stopped taking by herself. 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     Sleep:  7 hours (09/27/22 0600)  Prescribed Medications: Taken as prescribed  PRN Psychiatric Medications: acetaminophen, alum & mag hydroxide-simethicone, hydrOXYzine, melatonin, OLANZapine **OR** OLANZapine, ondansetron, senna-docusate   -No PRN meds needed.   Weekend Nursing Notes/Staff Report:  Last night did well. Nurse noted that she said she is getting more sleep since coming here. Denied SI/HI/Hallucinations. She said she misses her baby but was calm + cooperative and compliant with medications. Overall she is calmer and brighter. Attended groups. Slept well overnight, no events noted. Discussed with Jessica possibility of mother baby program following discharge through [facility] or [facility]. Ema may also have been seeing someone from [facility] .    Patient interview:  Ema Rojas  was sitting out in the [facility] and escorted us back to her room for the interview. Said she's better, back to 100% normal, no depression and ready to leave. I explained that to her that while she is heading in the right direction, she just arrived to the  unit and it may take some time to reach stability and improvement. Ema asked me and other members of the team if we had children and when we said no she said that  Is why we don't understand. We encouraged her that we would like her to get better so things will be better and safer for her and her baby at home. I asked her if she has been able to talk to her mom as we were informed that she did not make a phone call to her even though she has access to her cell phone. She said [place] mom [place] and that [place] is [place] service. We encouraged her to talk to her  and I said I would try getting a hold of her  today too. Ema repeatedly mentioned that she is behaving like this because of how she grew up and that she is normally very outgoing and talkative. Later when we left she pulled us aside she said that she would tell her  to pick her up so she can leave the country if we decide to keep her here longer.     Talked with Ema's spouse, Juana ([phone]). He wanted to know how long Ema would be here as she has been asking to leave and that she is very concerned for he baby. I explained that we don't have a definitive answer as it will depend on her progress and how she does, but that it will likely be at least a few days. Juana was very receptive. He asked about her diagnosis. I explained that she has symptoms concerning for psychosis and potentially ani, including disorganized thinking, hallucinations, insomnia, hyperverbal however we are unable to definitely say as we have not observed her long and it is difficult to assess at this point.  He asked about her medication and side effects of medication, informed him that some common side effects include sedation, metabolic symptoms including weight gain, high blood sugar, high cholesterol. Informed him that she is tolerating medication well so far and overally she seems to be improving and is headed in the right  "direction. Informed him that we recommend she followed up with PCP and outpatient psychiatrist, which he said they are interested in. Also said that she would like to continue the medication this time after her discharge. Also informed Juana that we completed the [place] paperwork and handed it back to Ema's nurse.      ROS   ROS was negative unless noted above.     Objective   Vitals:  Temp: 97  F (36.1  C) (Temp  Min: 97  F (36.1  C)  Max: 97.5  F (36.4  C))  Resp: 18 (Resp  Min: 18  Max: 18)  SpO2: 99 % (SpO2  Min: 99 %  Max: 100 %)  Pulse: 83 (Pulse  Min: 78  Max: 83)  BP: 125/79  Systolic (24hrs), Av , Min:118 , Max:125   Diastolic (24hrs), Av, Min:79, Max:81    Mental Status Examination:  Oriented: Grossly Oriented   Appearance:  awake, alert, appeared as age stated and well groomed  Attitude:  anxious, but less than yesterday, still defensive and argumentative but, no aggression appreciated   Eye Contact:  good  Mood: \" Much better\", \" ready to leave\"  Affect:  still some increased lability. Pt initially calm, then speech became a little more pressured, attitude more confrontational and more anxious . Overall improved from yesterday however.   Speech:  clear, coherent and normal prosody, increased volume later in encounter, and some what pressured-some difficult to interrupt  Psychomotor Behavior:  no evidence of tardive dyskinesia, dystonia, or tics  Thought Process:  Disorganized thinking, no hallucinations, delusions appreciated   Associations:  no loose associations  Thought Content:  no evidence of suicidal ideation or homicidal ideation and auditory hallucinations present possibly; pre-occupied with baby and leaving  Insight:  limited, does not seem to understand why she needs to stay here   Judgment:  limited, focus on baby-not on current state and hospitalization  Attention Span and Concentration:  intact  Recent and Remote Memory:  intact  Language:  English with appropriate syntax and " vocabulary  Fund of Knowledge: appropriate  Muscle Strength and Tone: normal  Gait and Station: Normal     Allergies   No Known Allergies     Labs & Imaging     No results found for this or any previous visit (from the past 24 hour(s)).    Trending Labs: (lithium levels, ANC, etc.)      Assessment   Diagnostic Impression:  Ema Rojas is a 32 year old  female with a past psychiatric history of psychosis unspecified who presented to the ED by ambulance with out of control behaviors, psychosis and ani in the context of recent menstrual period causing sleeplessness. Significant symptoms include depressed, mood lability, sleep issues, psychosis, disorganization and impulsive. Her last psychiatric hospitalization was in February 2022.  Current psychosocial stressors include chronic mental health issues, family dynamics and caring for her baby which she has been coping with by using substances and acting out to others.  Patient's support system includes family and community.  Substance use does not appear to be playing a contributing role in the patient's presentation.  There is genetic loading for none known. Medical history does not appear to be significant for obesity, in utero exposure and thyroid disorder.  Psychologically, Ema is very hesistant to acknowledge she has a mental illness due to a fear it will impact her ability to leave the hospital or care for her son. Socially, it seems like their family has a lot of support, and Ema could use some of it if she can communicate their needs. The MSE is notable for normal speech rate and volume after reported hyperverbal state in the ED. She denies self injurious behaviors. Her definitive diagnosis is still in evolution; differential includes peripartum psychosis, recurrent; bipolar affective disorder type 1, catamenial psychosis.  Additionally, she has traits of denial which interfere with her ability to adhere with the treatment plan.  Optimization of  medications to target these symptom clusters in addition to evaluation of adquate outpatient supports will be targets for treatment during this admission.      The patient had a similar episode in the immediate postpartum period in 2020 after her first baby . She improved rapidly on 5mg Abilify and she discharged within 5 days on Abilify 5mg + trazodone 50mg.   reports she stopped taking her medications as soon as she got home. Interestingly, in spite of non-adherence,  and patient both deny and known psychotic symptoms or mood episodes in between hospitalizations, prior to their onset again 5 days ago. No particular psychosocial stressor was identified apart from giving birth to her second child in . It seems that most of her mental health crises occur around hormonal changes in Ema's body, whether after a period or birth of a child. 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 Disposition pending clinical stabilization, medication optimization and development of an appropriate discharge plan.    22: Still showing limited insight and judgment into current presentation. Focused on returning home to baby. Other wise seems to be doing well, not as hyper verbal, no hallucinations/delusions/SI/HI appreciated on encounter today. Will continue with current medication regimen.     22: Seems less anxious today, still very persistent on leaving, thoughts still disorganized. Denies depressive symptoms at this time. Plan to keep on same medication regimen.       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 her PTA  were continued. PTA olanzapine (though pt not adherent with this) continued.  Olanzapine 10 mg ODT, po at bedtime, for psychosis.     Ema Rojas continued to meet criteria for inpatient hospitalization medication optimization, inpatient stabilization, and appropriate discharge planning.     Medical course:   Ema Rojas was physically examined by the ED prior to being transferred to the unit and was found to be medically stable and appropriate for admission.      Plan   Today's Changes/Updates:     No changes today    FMLA paperwork -completed- returned to nurse, Patience     Can look into mother baby program (via [facility] or [facility]).   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    Scheduled Medications (summary):  Current Facility-Administered Medications   Medication Dose Route Frequency     OLANZapine zydis  10 mg Oral At Bedtime       PRN Medications (summary):  Current Facility-Administered Medications   Medication Dose Route Frequency     acetaminophen  650 mg Oral Q4H PRN     alum & mag hydroxide-simethicone  30 mL Oral Q4H PRN     hydrOXYzine  25 mg Oral Q4H PRN     melatonin  3 mg Oral At Bedtime PRN     OLANZapine  10 mg Oral TID PRN    Or     OLANZapine  10 mg Intramuscular TID PRN     ondansetron  4-8 mg Oral Q8H PRN     senna-docusate  1 tablet Oral BID PRN       Discontinued Medications (& Rationale):    None    Consults:    None at this time.      Legal Status:    Initially 72 hr hold-> Voluntary     SIO:    N    Pt has not required locked seclusion or restraints in the past 24 hours to maintain safety, please refer to RN documentation for further details.    Disposition:    TBD, pending clinical stabilization, medication optimization, and formulation of a safe discharge plan.    Safety Assessment:   Behavioral Orders   Procedures     Code 1 - Restrict to Unit     Routine Programming     As clinically indicated     Status 15     Every 15 minutes.      ____________________________________________________________________  Pertinent Medical diagnoses and management:  #. Constipation  - Endorsed no BM in 5 days prior to admission.   - Started scheduled senna-docusate BID     #. Nausea, resolved  - Ondansetron 8 mg po PRN Q6H  ____________________________________________________________________  Patient seen and discussed with attending physician, Dr. Ammon Rivers, who is in agreement with my assessment and plan.    Angelita Wallis MD  PGY-1 Psychiatry Resident    Attending Attestation:  This patient has been seen and evaluated by me, Ammon Arana.  I have discussed this patient with the psychiatry resident and I agree with the findings and plan in this note.    I have reviewed today's vital signs, medications, labs and imaging.     Ammon Rivers MD on 9/27/2022 at 11:11 PM

## 2022-09-27 NOTE — PLAN OF CARE
Occupational Therapy Group Note     09/27/22 1353   Group Therapy Session   Group Attendance attended group session   Time Session Began 1115   Time Session Ended 1205   Total Time patient participated (minutes) 45   Total # Attendees 4   Group Type expressive therapy   Group Topic Covered coping skills/lifestyle management;emotions/expression   Group Session Detail OT: OT [facility] for creative expression, promoting autonomy, building sense of self-worth, coping with stress/symptoms and opportunity to exercise cognitive skills (i.e. initiation, planning, organization, sequencing, sustained attention, follow-through, termination of task and overall safety awareness).   Patient Response/Contribution organized;cooperative with task    Pt joined group and asked for assistance choosing a simple task that did not require creativity.  Pt opted to engage in 2-step task of sticker by number.  Pt was successful with activity after demonstration.  Pt worked independently and in a goal-directed manner for duration of group. Pt engaged in reciprocal conversation with staff without difficulty.  Mood: pleasant, content.  Affect: congruent.  Thought content: organized for task, no responding to internal stimuli observed.

## 2022-09-27 NOTE — PLAN OF CARE
"  Problem: Sleep Disturbance (Psychotic Signs/Symptoms)  Goal: Improved Sleep (Psychotic Signs/Symptoms)  Outcome: Ongoing, Progressing     Problem: Behavioral Health Plan of Care  Goal: Optimal Comfort and Wellbeing  Outcome: Ongoing, Progressing   Goal Outcome Evaluation: Ongoing     Observed to have slept well for approx 7 hrs on all Q 15\" rounds overnight w/ regular non-labored respirations and no reported or observed distress.  Safe, therapeutic environment maintained.                       "

## 2022-09-27 NOTE — PLAN OF CARE
Assessment/Intervention/Current Symptoms and Care Coordination  - chart review  - team meeting  - team rounds      Discharge Plan or Goal  Pending stabilization & development of a safe discharge plan.    Barriers to Discharge   Patient requires further psychiatric stabilization due to current symptomology      Referral Status  No new referrals    Legal Status  Patient is voluntary

## 2022-09-27 NOTE — PROGRESS NOTES
"   09/26/22 2108   Group Therapy Session   Group Attendance attended group session   Time Session Began 1715   Time Session Ended 1800   Total Time patient participated (minutes) 45   Total # Attendees 3   Group Type expressive therapy   Group Topic Covered emotions/expression   Patient Response/Contribution cooperative with task   AT directive was to create a personal intention collage using collage and mixed media art materials.  Goals of directive: to choose a personal intention  (or personal goal) for the day and/or week and to create a collage based on intention, to identify personal strengths and goals, emotional expression, future-focused directive.  Pt was a quiet participant, chose to look through magazines but did not choose any images/begin collage process today. Pt told author that she was \"not able to think\" and referred to feeling distracted and unable to focus on project at this time.   Pts mood was calm, pleasant participant.     "

## 2022-09-27 NOTE — PLAN OF CARE
Problem: Behavior Regulation Impairment (Psychotic Signs/Symptoms)  Goal: Improved Behavioral Control (Psychotic Signs/Symptoms)  Outcome: Ongoing, Progressing     Problem: Decreased Participation and Engagement (Psychotic Signs/Symptoms)  Goal: Increased Participation and Engagement (Psychotic Signs/Symptoms)  Outcome: Ongoing, Progressing     Problem: Psychomotor Impairment (Psychotic Signs/Symptoms)  Goal: Improved Psychomotor Symptoms (Psychotic Signs/Symptoms)  Outcome: Ongoing, Progressing     Pt's VSS this shift. Denies pain or physical discomfort. Pt denies all mental symptoms. Reported of getting good sleep over here. Pt denies anxiety and depression when asked by this writer. Pt was out in a milieu watching TV for most of the shift. Pt's mood is pleasant and cooperative with care. Pt ate dinner and snacks on time. Pt's  visited her today at 1900 and brought home food. No PRN medication was requested or given in this shift. Pt is compliant with meds.    Goal Outcome Evaluation:    Plan of Care Reviewed With: patient

## 2022-09-28 PROCEDURE — H2032 ACTIVITY THERAPY, PER 15 MIN: HCPCS

## 2022-09-28 PROCEDURE — 250N000013 HC RX MED GY IP 250 OP 250 PS 637: Performed by: PSYCHIATRY & NEUROLOGY

## 2022-09-28 PROCEDURE — 99233 SBSQ HOSP IP/OBS HIGH 50: CPT | Mod: GC | Performed by: PSYCHIATRY & NEUROLOGY

## 2022-09-28 PROCEDURE — 124N000002 HC R&B MH UMMC

## 2022-09-28 PROCEDURE — G0177 OPPS/PHP; TRAIN & EDUC SERV: HCPCS

## 2022-09-28 RX ADMIN — OLANZAPINE 10 MG: 10 TABLET, ORALLY DISINTEGRATING ORAL at 21:02

## 2022-09-28 ASSESSMENT — ACTIVITIES OF DAILY LIVING (ADL)
LAUNDRY: WITH SUPERVISION
DRESS: INDEPENDENT
HYGIENE/GROOMING: INDEPENDENT
ADLS_ACUITY_SCORE: 40
ORAL_HYGIENE: INDEPENDENT
ADLS_ACUITY_SCORE: 40

## 2022-09-28 NOTE — PLAN OF CARE
"  Problem: Behavior Regulation Impairment (Psychotic Signs/Symptoms)  Goal: Improved Behavioral Control (Psychotic Signs/Symptoms)  Outcome: Ongoing, Progressing     Problem: Cognitive Impairment (Psychotic Signs/Symptoms)  Goal: Optimal Cognitive Function (Psychotic Signs/Symptoms)  Outcome: Ongoing, Progressing     Problem: Mood Impairment (Psychotic Signs/Symptoms)  Goal: Improved Mood Symptoms (Psychotic Signs/Symptoms)  Outcome: Ongoing, Progressing   Goal Outcome Evaluation:      Patient reports doing well. Denies all mental health symptoms including anxiety and depression. Denies hearing voices or any kind of hallucinations. She presents bright and polite. Social with peers. No psychosis noted. Asked specific things she can do to be discharged. Writer instructed  Continues to be independent with ADLs and good grooming. Requested and was given bed linens to change her bedding, doing some  Coloring and word search puzzles. She attends and participates in groups. Her thoughts were organized and speech were clear.     Later in the shift, patient asked for her left over food from home. Writer was not able to find the food in any of the fridge. Patient got upset. \"you do not want to help me, what is the big deal with you getting my food\" she continues to demand for a different nurse. Writer informed her that left food are not saved in the fridge so there are chances that the food had been thrown away. She attended groups this shift. No further concern. Will continue to monitor.                "

## 2022-09-28 NOTE — PROGRESS NOTES
----------------------------------------------------------------------------------------------------------  [facility]  Psychiatric Progress Note  Hospital Day #4    Ema Rojas MRN# [id number]   Age: 32 year old YOB: 1990   Date of Admission: 9/21/2022     Subjective   The patient was discussed with the treatment team and chart notes were reviewed.      Identifier:   Bob Boo is a 32 years old patient who uses she/her pronouns brought to emergency room by EMS due to concern of ani. She was hospitalized at [facility] for ani, worsening psychosis from January 29, 2022 until February 7, 2022 discharged home with olanzapine 15 mg at bedtime.  Patient previously tried Abilify but he stopped taking by herself. 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     Sleep:  7 hours (09/28/22 0600)  Prescribed Medications: Taken as prescribed  PRN Psychiatric Medications: acetaminophen, alum & mag hydroxide-simethicone, hydrOXYzine, melatonin, OLANZapine **OR** OLANZapine, ondansetron, senna-docusate   -No PRN meds needed.     Weekend Nursing Notes/Staff Report:  No acute events. She has been attending and participating in groups. She has denied any anxiety, depression.  visited her and brought food. She was upset later when she said her leftover food was in the fridge but nurse could not find it. Otherwise doing well.     Patient interview:  Ema Rojas  was sitting out in the [facility] working on an art project when we talked to her. She escorted us to a more private area in the [facility]. She said she is good and back to normal. Ema noted that her visit with her  went well and the baby is ok. Asked about her discharge and we informed her that the plan for now is discharge on Friday. She was ok with this plan.    ROS   ROS was negative unless noted above.     Objective   Vitals:  Temp:  "97.1  F (36.2  C) (Temp  Min: 97.1  F (36.2  C)  Max: 97.1  F (36.2  C))  Resp: 12 (Resp  Min: 12  Max: 12)  SpO2: 100 % (SpO2  Min: 100 %  Max: 100 %)  Pulse: 80 (Pulse  Min: 80  Max: 80)  BP: 111/76  Systolic (24hrs), Av , Min:111 , Max:111   Diastolic (24hrs), Av, Min:76, Max:76    Mental Status Examination:  Oriented: Grossly Oriented   Appearance:  awake, alert, appeared as age stated and well groomed  Attitude:  a lot calmer and more cooperative today. Not as persistent, defensive,  focused on discharge compared to yesterday   Eye Contact:  good  Mood: \"Good\", \"Back to normal\"   Affect:  Still appears anxious but  Much less labile affect overall.  Overall improved from yesterday.   Speech:  clear, coherent and normal prosody  Psychomotor Behavior:  no evidence of tardive dyskinesia, dystonia, or tics  Thought Process:  More organized thought process today, no delusions, hallucinations appreciated    Associations:  no loose associations  Thought Content:  no evidence of suicidal ideation or homicidal ideation; still worried about baby but does not seem as pre-occupied with this.   Insight:  limited but improved from previous days, does not seem to understand why she needs to stay here   Judgment:  limited but improved from previous days, focused more on baby and less on hospitalization, healing  Attention Span and Concentration:  intact  Recent and Remote Memory:  intact  Language:  English with appropriate syntax and vocabulary  Fund of Knowledge: appropriate  Muscle Strength and Tone: not assessed   Gait and Station: Normal     Allergies   No Known Allergies     Labs & Imaging     No results found for this or any previous visit (from the past 24 hour(s)).    Trending Labs: (lithium levels, ANC, etc.)      Assessment   Diagnostic Impression:  Ema Rojas is a 32 year old  female with a past psychiatric history of psychosis unspecified who presented to the ED by ambulance with out of control " behaviors, psychosis and ani in the context of recent menstrual period causing sleeplessness. Significant symptoms include depressed, mood lability, sleep issues, psychosis, disorganization and impulsive. Her last psychiatric hospitalization was in 2022.  Current psychosocial stressors include chronic mental health issues, family dynamics and caring for her baby which she has been coping with by using substances and acting out to others.  Patient's support system includes family and community.  Substance use does not appear to be playing a contributing role in the patient's presentation.  There is genetic loading for none known. Medical history does not appear to be significant for obesity, in utero exposure and thyroid disorder.  Psychologically, Ema is very hesistant to acknowledge she has a mental illness due to a fear it will impact her ability to leave the hospital or care for her son. Socially, it seems like their family has a lot of support, and Ema could use some of it if she can communicate their needs. The MSE is notable for normal speech rate and volume after reported hyperverbal state in the ED. She denies self injurious behaviors. Her definitive diagnosis is still in evolution; differential includes peripartum psychosis, recurrent; bipolar affective disorder type 1, catamenial psychosis.  Additionally, she has traits of denial which interfere with her ability to adhere with the treatment plan.  Optimization of medications to target these symptom clusters in addition to evaluation of adquate outpatient supports will be targets for treatment during this admission.      The patient had a similar episode in the immediate postpartum period in 2020 after her first baby . She improved rapidly on 5mg Abilify and she discharged within 5 days on Abilify 5mg + trazodone 50mg.   reports she stopped taking her medications as soon as she got home. Interestingly, in spite of  non-adherence,  and patient both deny and known psychotic symptoms or mood episodes in between hospitalizations, prior to their onset again 5 days ago. No particular psychosocial stressor was identified apart from giving birth to her second child in 2021. It seems that most of her mental health crises occur around hormonal changes in Ema's body, whether after a period or birth of a child. 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 Disposition pending clinical stabilization, medication optimization and development of an appropriate discharge plan.    9/26/22: Still showing limited insight and judgment into current presentation. Focused on returning home to baby. Other wise seems to be doing well, not as hyper verbal, no hallucinations/delusions/SI/HI appreciated on encounter today. Will continue with current medication regimen.     9/27/22: Seems less anxious today, still very persistent on leaving, thoughts still disorganized. Denies depressive symptoms at this time. Plan to keep on same medication regimen.     9/28/22: Seems to be doing much better today after seeing her  last night. Seems less concerned with baby. Participating in groups, compliant with meds. No SI/HI/delusions/hallucinations appreciated during encounter today. Will plan for discharge Friday.       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 her PTA  were continued. PTA olanzapine (though pt not adherent with this) continued.  Olanzapine 10 mg ODT, po at bedtime, for psychosis.     Ema Rojas  continued to meet criteria for inpatient hospitalization medication optimization, inpatient stabilization, and appropriate discharge planning.     Medical course:   Ema Rojas was physically examined by the ED prior to being transferred to the unit and was found to be medically stable and appropriate for admission.      Plan   Today's Changes/Updates:     No changes today     Plan for discharge home on Friday, 9/30     Can look into mother baby program (via [facility] or [facility]).   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    Scheduled Medications (summary):  Current Facility-Administered Medications   Medication Dose Route Frequency     OLANZapine zydis  10 mg Oral At Bedtime       PRN Medications (summary):  Current Facility-Administered Medications   Medication Dose Route Frequency     acetaminophen  650 mg Oral Q4H PRN     alum & mag hydroxide-simethicone  30 mL Oral Q4H PRN     hydrOXYzine  25 mg Oral Q4H PRN     melatonin  3 mg Oral At Bedtime PRN     OLANZapine  10 mg Oral TID PRN    Or     OLANZapine  10 mg Intramuscular TID PRN     ondansetron  4-8 mg Oral Q8H PRN     senna-docusate  1 tablet Oral BID PRN       Discontinued Medications (& Rationale):    None    Consults:    None at this time.     Legal Status:    Initially 72 hr hold-> Voluntary     SIO:    N    Pt has not required locked seclusion or restraints in the past 24 hours to maintain safety, please refer to RN documentation for further  details.    Disposition:    TBD, pending clinical stabilization, medication optimization, and formulation of a safe discharge plan.    Safety Assessment:   Behavioral Orders   Procedures     Code 1 - Restrict to Unit     Routine Programming     As clinically indicated     Status 15     Every 15 minutes.      ____________________________________________________________________  Pertinent Medical diagnoses and management:  #. Constipation  - Endorsed no BM in 5 days prior to admission.   - Started scheduled senna-docusate BID     #. Nausea, resolved  - Ondansetron 8 mg po PRN Q6H  ____________________________________________________________________  Patient seen and discussed with attending physician, Dr. Ammon Rivers, who is in agreement with my assessment and plan.    Angelita Wallis MD  PGY-1 Psychiatry Resident    Attending Attestation:  This patient has been seen and evaluated by me, Ammon Arana.  I have discussed this patient with the psychiatry resident and I agree with the findings and plan in this note.    I have reviewed today's vital signs, medications, labs and imaging.     Ammon Rivers MD on 9/28/2022 at 10:10 PM

## 2022-09-28 NOTE — PLAN OF CARE
Problem: Behavior Regulation Impairment (Psychotic Signs/Symptoms)  Goal: Improved Behavioral Control (Psychotic Signs/Symptoms)  Outcome: Ongoing, Progressing     Problem: Decreased Participation and Engagement (Psychotic Signs/Symptoms)  Goal: Increased Participation and Engagement (Psychotic Signs/Symptoms)  Outcome: Ongoing, Progressing     Problem: Mood Impairment (Psychotic Signs/Symptoms)  Goal: Improved Mood Symptoms (Psychotic Signs/Symptoms)  Outcome: Ongoing, Progressing     Pt's VSS in this shift. Denies pain or any concern during this shift. Pt denies all mental symptoms. Denies anxiety and depression when asked by this writer. Pt is hopeful and stated that she is looking forward to get discharge from here. Pt mood is pleasant and very cooperative with cares. Pt ate snacks and dinner on time. Pt spent most of her time out in a lounge area doing arts and watching TV with peers. Pt stated that her sleep and mood has gotten much better since she was admitted here. Pt used personal cell phone to video call her family. Pt did participate in evening music therapy group. No PRN medication was requested or given during this shift. Pt is compliant with meds. There is new order for Covid swab to be collect.        Goal Outcome Evaluation:    Plan of Care Reviewed With: patient

## 2022-09-28 NOTE — PLAN OF CARE
"  Problem: Sleep Disturbance (Psychotic Signs/Symptoms)  Goal: Improved Sleep (Psychotic Signs/Symptoms)  Outcome: Ongoing, Progressing     Problem: Behavioral Health Plan of Care  Goal: Optimal Comfort and Wellbeing  Outcome: Ongoing, Progressing   Goal Outcome Evaluation: Ongoing    Observed to have slept well for approx 7 Hrs. Overnight on all Q 15 \" rounds w/ regular non-labored respirations and no reported or observed distress.  Safe, therapeutic environment maintained.                       "

## 2022-09-28 NOTE — PLAN OF CARE
09/28/22 1218   Group Therapy Session   Group Attendance attended group session   Time Session Began 1015   Time Session Ended 1115   Total Time patient participated (minutes) 30 (No Charge)   Total # Attendees 7   Group Type Occupational Therapy   Group Topic Covered balanced lifestyle;coping skills/lifestyle management;leisure exploration/use of leisure time;relaxation techniques;cognitive activities   Group Session Detail OT Clinic Group   Patient Response Detail Intervention: OT Clinic with 6 peers    Patient Response: Pt joined clinic group stating this was their first time attending this group. Writer offered explanation of group structure, purpose and project options available to them. Pt requested to work on a sticker art project, having done this in the past and enjoying it. Pt IND selected a project from the book and initiated working on this. Pt was moderately social with nursing students in the room and another peer sitting nearby, was otherwise focused on project. After about 30 minutes pt asked if they could finish a different time, thanked writer for their time and left group.     Mood/Affect: Pleasant     Plan: Patient encouraged to maintain attendance for continued ongoing support in working towards occupational therapy goals to support overall treatment/care.

## 2022-09-28 NOTE — PLAN OF CARE
"   09/28/22 1530   Group Therapy Session   Group Attendance attended group session   Time Session Began 1415   Time Session Ended 1500   Total Time patient participated (minutes) 45   Total # Attendees 4   Group Type Occupational Therapy   Group Topic Covered balanced lifestyle;coping skills/lifestyle management;leisure exploration/use of leisure time;emotions/expression;relaxation techniques;self-care activities;structured socialization   Group Session Detail OT Wellness group through gentle movement/stretching and conversation/socialization   Patient Response Detail Intervention: Wellness Group with 3 peers.    Patient Response: Pt participated in a wellness group focused on wellness through gentle movement/stretching and conversation/socialization. Pt was an active participant for the duration of the group, following along with both the movements and answering most of the questions. When prompted with \"what is something that you have accomplished in life that you are proud of\", pt responded with \"I don't have anything to be proud of\" and requested to pass on answering this question. Shared that some of the goals they have for the future are: to be good mother to my son and find a job and that the would like to become a nurse.     Mood/Affect: Pleasant       Plan: Patient encouraged to maintain attendance for continued ongoing support in working towards occupational therapy goals to support overall treatment/care.          "

## 2022-09-29 VITALS
SYSTOLIC BLOOD PRESSURE: 120 MMHG | OXYGEN SATURATION: 100 % | TEMPERATURE: 97.6 F | HEART RATE: 63 BPM | BODY MASS INDEX: 26.96 KG/M2 | WEIGHT: 124.6 LBS | RESPIRATION RATE: 12 BRPM | DIASTOLIC BLOOD PRESSURE: 70 MMHG

## 2022-09-29 PROCEDURE — 250N000013 HC RX MED GY IP 250 OP 250 PS 637: Performed by: PSYCHIATRY & NEUROLOGY

## 2022-09-29 PROCEDURE — 124N000002 HC R&B MH UMMC

## 2022-09-29 PROCEDURE — 99232 SBSQ HOSP IP/OBS MODERATE 35: CPT | Mod: GC | Performed by: PSYCHIATRY & NEUROLOGY

## 2022-09-29 RX ORDER — OLANZAPINE 10 MG/1
10 TABLET ORAL AT BEDTIME
Qty: 30 TABLET | Refills: 0 | Status: ON HOLD | OUTPATIENT
Start: 2022-09-29 | End: 2023-06-20

## 2022-09-29 RX ADMIN — OLANZAPINE 10 MG: 10 TABLET, ORALLY DISINTEGRATING ORAL at 19:58

## 2022-09-29 ASSESSMENT — ACTIVITIES OF DAILY LIVING (ADL)
ADLS_ACUITY_SCORE: 40
HYGIENE/GROOMING: INDEPENDENT
ADLS_ACUITY_SCORE: 40
ADLS_ACUITY_SCORE: 40
LAUNDRY: WITH SUPERVISION
ADLS_ACUITY_SCORE: 40
ORAL_HYGIENE: INDEPENDENT
ADLS_ACUITY_SCORE: 40
ORAL_HYGIENE: INDEPENDENT
HYGIENE/GROOMING: INDEPENDENT
LAUNDRY: WITH SUPERVISION
DRESS: INDEPENDENT
DRESS: INDEPENDENT

## 2022-09-29 NOTE — PLAN OF CARE
"  Problem: Sleep Disturbance (Psychotic Signs/Symptoms)  Goal: Improved Sleep (Psychotic Signs/Symptoms)  Outcome: Ongoing, Progressing     Problem: Behavioral Health Plan of Care  Goal: Optimal Comfort and Wellbeing  Outcome: Ongoing, Progressing   Goal Outcome Evaluation: Ongoing     Observed to have slept well for approx 7 hrs on all Q15\" rounds overnight w/ regular non-labored respirations and no reported or observed distress.  Safe, therapeutic environment maintained.                       "

## 2022-09-29 NOTE — PROGRESS NOTES
----------------------------------------------------------------------------------------------------------  [facility]  Psychiatric Progress Note  Hospital Day #5    Ema Rojas MRN# [id number]   Age: 32 year old YOB: 1990   Date of Admission: 9/21/2022     Subjective   The patient was discussed with the treatment team and chart notes were reviewed.      Identifier:   Bob Boo is a 32 years old patient who uses she/her pronouns brought to emergency room by EMS due to concern of ani. She was hospitalized at [facility] for ani, worsening psychosis from January 29, 2022 until February 7, 2022 discharged home with olanzapine 15 mg at bedtime.  Patient previously tried Abilify but he stopped taking by herself. 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     Sleep:  7 hours (09/29/22 0600)  Prescribed Medications: Taken as prescribed  PRN Psychiatric Medications: acetaminophen, alum & mag hydroxide-simethicone, hydrOXYzine, melatonin, OLANZapine **OR** OLANZapine, ondansetron, senna-docusate   -No PRN meds needed.     Weekend Nursing Notes/Staff Report:  No acute events. Participated in evening music relaxation group. Denied any anxiety or depression. She said sleep and mood are much better since she got here. Pleasant and cooperative. Ate meals. Used personal cell to video call family.    Patient interview:  Ema said she is feeling good and much better. She said she is ready for discharge and said her partner can come at 10am. Reviewed medication side effects with her and asked if she would be interested in outpatient follow up. She said yes.    ROS   ROS was negative unless noted above.     Objective   Vitals:  Temp: 97.6  F (36.4  C) (Temp  Min: 97  F (36.1  C)  Max: 97.6  F (36.4  C))  Resp: 12 (Resp  Min: 12  Max: 16)  SpO2: 100 % (SpO2  Min: 100 %  Max: 100 %)  Pulse: 63 (Pulse  Min:  "63  Max: 76)  BP: 120/70  Systolic (24hrs), Av , Min:115 , Max:120   Diastolic (24hrs), Av, Min:70, Max:79    Mental Status Examination:  Oriented: Grossly Oriented   Appearance:  awake, alert, appeared as age stated and well groomed  Attitude:  calm, cooperative  Eye Contact:  good  Mood: \"Good\", \"Back to normal\"   Affect:  Congruent with mood. Appears less anxious.   Psychomotor Behavior:  no evidence of tardive dyskinesia, dystonia, or tics  Thought Process:  More organized thought process today, no delusions, hallucinations appreciated    Associations:  no loose associations  Thought Content:  no evidence of suicidal ideation or homicidal ideation  Insight:  fair  Judgment:  fair  Attention Span and Concentration:  intact  Recent and Remote Memory:  intact  Language:  English with appropriate syntax and vocabulary  Fund of Knowledge: appropriate  Muscle Strength and Tone: not assessed   Gait and Station: Normal     Allergies   No Known Allergies     Labs & Imaging     No results found for this or any previous visit (from the past 24 hour(s)).    Trending Labs: (lithium levels, ANC, etc.)      Assessment   Diagnostic Impression:  Ema Rojas is a 32 year old  female with a past psychiatric history of psychosis unspecified who presented to the ED by ambulance with out of control behaviors, psychosis and ani in the context of recent menstrual period causing sleeplessness. Significant symptoms include depressed, mood lability, sleep issues, psychosis, disorganization and impulsive. Her last psychiatric hospitalization was in 2022.  Current psychosocial stressors include chronic mental health issues, family dynamics and caring for her baby which she has been coping with by using substances and acting out to others.  Patient's support system includes family and community.  Substance use does not appear to be playing a contributing role in the patient's presentation.  There is genetic " loading for none known. Medical history does not appear to be significant for obesity, in utero exposure and thyroid disorder.  Psychologically, Ema is very hesistant to acknowledge she has a mental illness due to a fear it will impact her ability to leave the hospital or care for her son. Socially, it seems like their family has a lot of support, and Ema could use some of it if she can communicate their needs. The MSE is notable for normal speech rate and volume after reported hyperverbal state in the ED. She denies self injurious behaviors. Her definitive diagnosis is still in evolution; differential includes peripartum psychosis, recurrent; bipolar affective disorder type 1, catamenial psychosis.  Additionally, she has traits of denial which interfere with her ability to adhere with the treatment plan.  Optimization of medications to target these symptom clusters in addition to evaluation of adquate outpatient supports will be targets for treatment during this admission.      The patient had a similar episode in the immediate postpartum period in 2020 after her first baby . She improved rapidly on 5mg Abilify and she discharged within 5 days on Abilify 5mg + trazodone 50mg.   reports she stopped taking her medications as soon as she got home. Interestingly, in spite of non-adherence,  and patient both deny and known psychotic symptoms or mood episodes in between hospitalizations, prior to their onset again 5 days ago. No particular psychosocial stressor was identified apart from giving birth to her second child in . It seems that most of her mental health crises occur around hormonal changes in Ema's body, whether after a period or birth of a child. 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 Disposition pending clinical  stabilization, medication optimization and development of an appropriate discharge plan.    9/26/22: Still showing limited insight and judgment into current presentation. Focused on returning home to baby. Other wise seems to be doing well, not as hyper verbal, no hallucinations/delusions/SI/HI appreciated on encounter today. Will continue with current medication regimen.     9/27/22: Seems less anxious today, still very persistent on leaving, thoughts still disorganized. Denies depressive symptoms at this time. Plan to keep on same medication regimen.     9/28/22: Seems to be doing much better today after seeing her  last night. Seems less concerned with baby. Participating in groups, compliant with meds. No SI/HI/delusions/hallucinations appreciated during encounter today. Will plan for discharge Friday.     9/29/22: Overall , Ema is doing much better today. She is calm, cooperative, agreeable to continuing medications and outpatient follow up.  No SI/HI, hallucinations/delusions appreciated on this encounter. Ready for discharge tomorrow 9/30/22.     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 her PTA  were continued. PTA olanzapine (though pt not adherent with this) continued.  Olanzapine 10 mg ODT, po at bedtime, for psychosis.     Ema Rojas continued to meet criteria for inpatient hospitalization medication optimization, inpatient stabilization, and appropriate discharge planning.     Medical course:   Ema Rojas was physically examined by the ED prior to being transferred to the unit and was found to be medically stable and appropriate for admission.      Plan   Today's Changes/Updates:     No changes  today     Plan for discharge home tomorrow Friday, 9/30     Schedule outpatient appointment on discharge.   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    Scheduled Medications (summary):  Current Facility-Administered Medications   Medication Dose Route Frequency     OLANZapine zydis  10 mg Oral At Bedtime       PRN Medications (summary):  Current Facility-Administered Medications   Medication Dose Route Frequency     acetaminophen  650 mg Oral Q4H PRN     alum & mag hydroxide-simethicone  30 mL Oral Q4H PRN     hydrOXYzine  25 mg Oral Q4H PRN     melatonin  3 mg Oral At Bedtime PRN     OLANZapine  10 mg Oral TID PRN    Or     OLANZapine  10 mg Intramuscular TID PRN     ondansetron  4-8 mg Oral Q8H PRN     senna-docusate  1 tablet Oral BID PRN       Discontinued Medications (& Rationale):    None    Consults:    None at this time.     Legal Status:    Initially 72 hr hold-> Voluntary     SIO:    N    Pt has not required locked seclusion or restraints in the past 24 hours to maintain safety, please refer to RN documentation for further details.    Disposition:    TBD, pending clinical stabilization, medication optimization, and formulation of a safe discharge plan.    Safety Assessment:   Behavioral Orders   Procedures     Code 1 - Restrict to Unit     Routine Programming     As clinically indicated     Status 15     Every 15 minutes.      ____________________________________________________________________  Pertinent Medical  diagnoses and management:  #. Constipation  - Endorsed no BM in 5 days prior to admission.   - Started scheduled senna-docusate BID     #. Nausea, resolved  - Ondansetron 8 mg po PRN Q6H  ____________________________________________________________________  Patient seen and discussed with attending physician, Dr. Ammon Rivers, who is in agreement with my assessment and plan.    Angelita Wallis MD  PGY-1 Psychiatry Resident    Attending Attestation:  This patient has been seen and evaluated by me, Ammon Arana.  I have discussed this patient with the psychiatry resident and I agree with the findings and plan in this note.    I have reviewed today's vital signs, medications, labs and imaging.     Ammon Rivers MD on 9/29/2022 at 10:18 PM

## 2022-09-29 NOTE — PLAN OF CARE
Occupational Therapy Group Note      09/29/22 1255   Group Therapy Session   Group Attendance attended group session   Total Time patient participated (minutes) 15   Group Session Detail OT: Qigong sequence and wellness discussion for parsympathetic nervous system activation, sensory modulation, building mind-body awareness, building insight into total body wellness and coping with stress/sxs.   Patient Response/Contribution cooperative with task    Pt joined group late and left early.  She engaged in physical movement.  General disorganization observed as pt was unable to maintain attention and left group early.

## 2022-09-29 NOTE — PLAN OF CARE
"Occupational Therapy Group Note      09/29/22 7497   Group Therapy Session   Group Attendance attended group session   Total Time patient participated (minutes) 30   Group Session Detail OT: [employer] game for attention,concentration, new learning, follow through, visuospatial recognition, opportunity for social engagement, leisure education/exploration, reality orientation and managing mood/symptoms.      Patient Response/Contribution confused;cooperative with task;disorganized    Pt joined group and repeatedly shared, \"I've never played a game.\"  Pt required maximal assistance for turn taking but with repetition, improved to needing moderate assistance.  Pt required encouragement from writer to persist with learning; pt tolerated group for ~30 minutes and then opted to leave. Pt appeared proud of herself for learning a new game.  Thought content continues to be disorganized.          "

## 2022-09-29 NOTE — PLAN OF CARE
Occupational Therapy Group Note      09/29/22 1446   Group Therapy Session   Group Attendance attended group session   Total Time patient participated (minutes) 35   Group Session Detail OT: OT [facility] for creative expression, promoting autonomy, building sense of self-worth, coping with stress/symptoms and opportunity to exercise cognitive skills (i.e. initiation, planning, organization, sequencing, sustained attention, follow-through, termination of task and overall safety awareness).   Patient Response/Contribution confused;disorganized    Pt joined group and presented with overall confusion and disorganization.  She requested to do a [employer] project but did not paint on the project but rather used scratch paper to replicate the project.  It appeared that the patient did not understand that she was able to paint directly on the project.  Performance/follow-through did not improve with writer offering verbal and visual cues. Pt finished the group working on a simple 2-step project that she started in previous days.  Pt opted to leave group early.  Mood: pleasant, calm.  Affect: congruent.  Thought content: disorganized.

## 2022-09-29 NOTE — PLAN OF CARE
Problem: Behavior Regulation Impairment (Psychotic Signs/Symptoms)  Goal: Improved Behavioral Control (Psychotic Signs/Symptoms)  Outcome: Ongoing, Progressing     Problem: Cognitive Impairment (Psychotic Signs/Symptoms)  Goal: Optimal Cognitive Function (Psychotic Signs/Symptoms)  Outcome: Ongoing, Progressing   Goal Outcome Evaluation:    Plan of Care Reviewed With: patient               Patient reported doing well. No concern was reported. She denies all psych symptoms. She contracts for safety. Out in the lounge watching TV with peers. Not much social interactions. She said she is looking forward to her discharge tomorrow. Showered Staff will continue to monitor.

## 2022-09-29 NOTE — PROGRESS NOTES
09/28/22 1900   Group Therapy Session   Time Session Began 1715   Time Session Ended 1800   Total Time patient participated (minutes) 30   Total # Attendees 4   Group Type expressive therapy   Group Topic Covered balanced lifestyle;coping skills/lifestyle management;relaxation techniques   Group Session Detail Evening Relaxation   Patient Response/Contribution cooperative with task   Patient Response Detail Cooperatively engaged in Evening Music Relaxation group to decrease anxiety and promote sleep later in the evening.  Calm affect, appropriately engaged in session, responding well to the music.  Pleasant affect.     *Note: calming and enjoyable nature of the intervention may have masked possible symptoms.

## 2022-09-29 NOTE — PLAN OF CARE
Assessment/Intervention/Current Symptoms and Care Coordination  - chart review  - team meeting     Discharge Plan or Goal  Discharge home   9/30/22 10:00am    Barriers to Discharge   Patient requires further psychiatric stabilization due to current symptomology    Referral Status    Legal Status   voluntary

## 2022-09-30 PROCEDURE — 99239 HOSP IP/OBS DSCHRG MGMT >30: CPT | Mod: GC | Performed by: PSYCHIATRY & NEUROLOGY

## 2022-09-30 ASSESSMENT — ACTIVITIES OF DAILY LIVING (ADL)
LAUNDRY: WITH SUPERVISION
ADLS_ACUITY_SCORE: 40
HYGIENE/GROOMING: INDEPENDENT
ORAL_HYGIENE: INDEPENDENT
ADLS_ACUITY_SCORE: 40
ADLS_ACUITY_SCORE: 40
DRESS: INDEPENDENT
ADLS_ACUITY_SCORE: 40
ADLS_ACUITY_SCORE: 40

## 2022-09-30 NOTE — PLAN OF CARE
Assessment/Intervention/Current Symptoms and Care Coordination  - chart review  - individual meeting with patient to finalize discharge planning - see referral status below. previously did inquire about rescheduling with [facility] as had canceled previously scheduled intake - first available intake was December 2, 2022. - discussed other options and was able to schedule with another provider clinic as mentioned in referral status below and in discharge instructions.   - team meeting      Discharge Plan or Goal  Discharge today to home with out patient follow up      Barriers to Discharge   none      Referral Status  Met with patient to discuss / finalize follow up.     During our meeting  Declines individual therapy at this time.   Is wanting to schedule for medication management  Says virtual visits are best for her.     Scheduled as below:      Psychiatry/Medication Management.   October 18, 2022 - 1:00pm -  Patrick Schoenecker APRN, CNP, PHN - virtual / video appointment   [facility]  - : [phone] Fax: [phone]  You will received an e-mail to the e-mail address you provided with an intake packet - please complete as soon as you are able.     Psychiatry team approached this writer after final check in saying patient was now interested in individual therapy. As it was also time for planned discharge /  by . This writer is unable to schedule. Did add in instructions to discuss therapy options at above intake appointment.       Legal Status  Patient is voluntary

## 2022-09-30 NOTE — PLAN OF CARE
Problem: Mood Impairment (Psychotic Signs/Symptoms)  Goal: Improved Mood Symptoms (Psychotic Signs/Symptoms)  Outcome: Ongoing, Progressing   Goal Outcome Evaluation:    Plan of Care Reviewed With: patient      Nursing Assessment    General Shift Summary  Pt laying in bed the majority of the shift. Pt was up for dinner, made video call to  and baby, and then sat in the lounge for about an hour. Pt denies all MH sx, states that they are feeling like their normal self. Pt states when they first came here they had not slept for five days and were having a lot of anxiety but are better now. Pt stated that they have a ride set up for discharge tomorrow.     Patient is medication compliant and reported no side effects. No PRN medications given this shift. Discussed with pt importance of medication adherence after discharge and to plan with psychiatrist if they plan to quit taking medication. Pt states that they will keep taking their medication when they leave.     Hygiene and appetite are appropriate.     Reji Jimenez RN

## 2022-09-30 NOTE — PLAN OF CARE
Goal Outcome Evaluation:    Plan of Care Reviewed With: patient      Patient continues to be calm this morning. Reported doing well. Patient reported being ready to be discharged. Patient seen by provider and received discharge order. Patient discharged home to her .  Sent with all belongings and discharge medication. No concerns.

## 2022-09-30 NOTE — PLAN OF CARE
"  Problem: Behavioral Health Plan of Care  Goal: Optimal Comfort and Wellbeing  Outcome: Ongoing, Progressing     Problem: Sleep Disturbance (Psychotic Signs/Symptoms)  Goal: Improved Sleep (Psychotic Signs/Symptoms)  Outcome: Adequate for Care Transition   Goal Outcome Evaluation: Ongoing     Observed to have slept well for approx 7 hrs on all Q 15\" rounds overnight  w/ regular non-labored respirations and no reported or observed distress.  Safe, therapeutic environment maintained.                      "

## 2022-10-01 NOTE — DISCHARGE SUMMARY
"    ----------------------------------------------------------------------------------------------------------  [facility], [place]   Discharge Summary  Hospital Day #6     Ema Rojas   MRN# [id number]   Age: 32 year old YOB: 1990   Date of Admission:  9/21/2022  Date of Discharge:  9/30/2022 10:36 AM  Admitting Physician:  Ammon Rivers MD  Discharge Physician:  Ammon Rivers MD     Event Leading to Hospitalization:     Bob Boo is a 32 years old patient who uses she/her pronouns brought to emergency room by EMS due to concern of ani. She was hospitalized at [facility] for ani, worsening psychosis from January 29, 2022 until February 7, 2022 discharged home with olanzapine 15 mg at bedtime.  Patient previously tried Abilify but he stopped taking by herself.     Per ED Note:   \"Ema Rojas is a 32 year old female who presents to us with a chief complaint of insomnia and left arm pain.  Describes her pain rating down her left arm that is a burning sensation.  No known injury.  Patient took 1 single 200 mg ibuprofen tablet which did not help with the pain.  No rash.  She also notes difficulty sleeping for the last several days.  Per chart review patient has a history of psychosis that exhibited initially with inability to sleep.  She was admitted and then when she was stabilized and discharged she did not continue any medication outpatient.  She is not currently suicidal or homicidal.  She and her  do not feel she needs to see a mental health  at this time.\"    Per DEC Note:   \"Pt brought to the ED by EMS.  Triage notes indicate that she was outside her home yelling at her . It is unclear who called 911.  In the ED pt appears to be manic. Her speech is pressured, rambling and tangential.  She was hyersexual grabbing at RN and other staff in the hallway.  She was moved to ED 1.   She is able to engage with writer for a " "brief period of time and then will break out laughing to the point of tears, pointing around the room and saying things that are not understood. She then pats the top of her head and runs her right hand down her left arm and claps, she repeats this several times.   When asked what brings her to the ED she says \"it is a complicated story.\"   She is religiously preoccupied and talks about being a Jain and then proceeds to try and list off all the different types of Taoist's.    She is having trouble sleeping and says that she has pain in her head to her arm, she points to the top of her scalp and touches it to the tips of her fingers on her left hand. She talks about the \"word of God\" watching Taoist channels on TV, not sleeping for 5 days, the moon, seeing her dad in the moon and from [facility].\"    See Admission note by Dr. Padilal on 9/24/22.      Objective:   24 Hour Vital Signs Summary:   Latest vitals: 9/28/22:   BP:120/70, Temp: 97.6, HR:63, RR: 12, SpO2:100%    Weight is 124 lbs 9.6 oz Body mass index is 26.96 kg/m .    Mental Status Examination:  Oriented: Grossly Oriented   Appearance:  awake, alert, appeared as age stated and well groomed, in home clothes  Attitude:  calm, cooperative  Eye Contact:  good  Mood: \" Really good\", \"Ready to go home\"   Affect:  Congruent with mood. Appears less anxious. No lability or pressured speech appreciated.   Psychomotor Behavior:  no evidence of tardive dyskinesia, dystonia, or tics  Thought Process:  More organized thought process today, no delusions, hallucinations appreciated    Associations:  no loose associations  Thought Content:  no evidence of suicidal ideation or homicidal ideation  Insight:  fair  Judgment:  fair  Attention Span and Concentration:  intact  Recent and Remote Memory:  intact  Language:  English with appropriate syntax and vocabulary  Fund of Knowledge: appropriate  Muscle Strength and Tone: not assessed   Gait and Station: Normal     " Patient's Strengths & Goals:   Patient stated that their goal(s) for treatment were: to get better so this doesn't happen again and to take care of baby      Diagnoses:   Primary psychiatric diagnoses:   - Psychosis, unspecified psychosis type   - Rule out bipolar I disorder, current episode manic, with psychotic features  - Rule out catamenial psychosis     Hospital Course:   Diagnostic Impression:  Ema Rojas is a 32 year old female with a past psychiatric history of psychosis unspecified who presented to the ED by ambulance with out of control behaviors, psychosis and ani in the context of recent menstrual period causing sleeplessness. Significant symptoms include depressed, mood lability, sleep issues, psychosis, disorganization and impulsive. Her last psychiatric hospitalization was in February 2022.  Current psychosocial stressors include chronic mental health issues, family dynamics and caring for her baby which she has been coping with by using substances and acting out to others.  Patient's support system includes family.  Substance use does not appear to be playing a contributing role in the patient's presentation.  There is genetic loading for none known. Medical history does not appear to be significant for obesity, in utero exposure and thyroid disorder.  Psychologically, Ema is very hesistant to acknowledge she has a mental illness due to a fear it will impact her ability to leave the hospital or care for her son. Socially, it seems like their family has a lot of support, and Ema could use some of it if she can communicate their needs. The MSE is notable for normal speech rate and volume after reported hyperverbal state in the ED. She denies self injurious behaviors. Her definitive diagnosis is still in evolution; differential includes peripartum psychosis, recurrent; bipolar affective disorder type 1, catamenial psychosis.  Additionally, she has traits of denial which interfere with her  ability to adhere with the treatment plan.  Optimization of medications to target these symptom clusters in addition to evaluation of adquate outpatient supports will be targets for treatment during this admission.      The patient had a similar episode in the immediate postpartum period in 2020 after her first baby . She improved rapidly on 5mg Abilify and she discharged within 5 days on Abilify 5mg + trazodone 50mg.   reports she stopped taking her medications as soon as she got home. Interestingly, in spite of non-adherence,  and patient both deny and known psychotic symptoms or mood episodes in between hospitalizations, prior to their onset again 5 days ago. No particular psychosocial stressor was identified apart from giving birth to her second child in . It seems that most of her mental health crises occur around hormonal changes in Ema's body, whether after a period or birth of a child. This makes catamenial psychosis or BPAD1 more likely    Psychiatric Course:   Ema Rojas was admitted to [facility]  With attending Dr. TEJAL Scott as a voluntary patient on a 72 hour hold. Pt remained on unit on voluntary status after hold . She had previously been prescribed Zyprexa 15mg bed time but she had not been taking this.  Olanzapine 10 mg ODT, po at bedtime, for psychosis started      Today Ema Rojas deniess SI, SIB and HI. No overt evidence of psychosis or ani observed. Patient grossly appears to be cognitively intact. Insight and judgement have improved significantly since admission. Initially, Ema was more anxious, labile and pre-occupied with getting back to her baby when I first met her. Now seems to understand more why she came to the hospital and expressed interest in self-care in order to prevent this from happening in the future. Patient has not exhibited aggressive or violence behaviors during this admission.  Patient agreed to further reduce risk of  self-harm by be adherent with medications, following up with outpatient psychiatrist and therapist and agreed to remain medication adherent. Additional steps taken to minimize risk include: medication optimization, close psychiatric follow up and provision of crisis resources . Voluntary referral for outpatient psychiatry and therapy were offered, she accepted this offer.     During this admission, Ema was an active participant in groups and was frequently visible in the milieu. Her symptoms of  depressed, mood lability, sleep issues, psychosis, disorganization and impulsive improved significantly during course of stay. At the time of discharge she was determined to not be a danger to herself or others.    Medical Course:   Patient was medically cleared for admission to the unit. No medical issues arose during this hospitalization.     Consults:     None on this encounter    Risk Assessment:   Today Ema denies any symptoms of depression, SI or HI. Notable risk factors for self-harm/suicide include stressors at home including baby, lack of family support. Risk is mitigated, however, by child, support with , interest in following up with therapy and psychiatry post-discharge.  Therefore, based on all available evidence including the factors cited above, Ema Rojas does not appear to be an imminent danger to self or others, a 72 hour hold is not indicated, and is thus is appropriate for an outpatient level of care. If, however, patient uses substances or is non-adherent with medication, their risk of decompensation and SI/HI will be elevated. This was discussed with the patient.    This document serves as a transfer of care to Ema Rojas's outpatient providers.     Discharge Plan:   Patient is being discharged to home with the following medications and appointments as detailed below:    Medications:  Added/Changed:    Zyprexa 10mg bedtime    Continued:    None    Discontinued:    None (previously  prescribed Zyprexa 15mg bedtime but has not been taking).     Discharge Medications (summary):  Discharge Medication List as of 9/30/2022 10:29 AM      START taking these medications    Details   OLANZapine (ZYPREXA) 10 MG tablet Take 1 tablet (10 mg) by mouth At Bedtime, Disp-30 tablet, R-0, E-Prescribe             Patient discharge/disposition: Good. Discharge to home.     Healthcare Appointments and Contacts:   Summary: You were admitted on 9/21/2022 due to symptoms concerning for ani and psychosis.  You were treated by Ammon Arana MD and discharged on 9/30/2022 from [facility] to Home     Main Diagnosis:   - Psychosis, unspecified psychosis type   - Rule out bipolar I disorder, current episode manic, with psychotic features  - Rule out catamenial psychosis     Health Care Follow-up:      Psychiatry Medication Management:   October 18, 2022 - 1:00pm -  Emilio Schoenecker APRN, CNP, PHN - virtual / video appointment   [facility]  - : [phone] Fax: [phone]  You will received an e-mail to the e-mail address you provided with a link and intake packet - please complete as soon as you are able.   It will be sent to the following e-mail: [email or web address]     Individual Therapy - during your appointment speak with your provider about individual therapy options.      Attend all scheduled appointments with your outpatient providers. Call at least 24 hours in advance if you need to reschedule an appointment to ensure continued access to your outpatient providers.       Patient seen and discussed with attending physician, Dr. Arana .  Angelita Wallis MD  PGY-1 Psychiatry Resident    45 minutes was used in interviewing and planning this discharge.     Attestation:  The patient has been seen and evaluated by me, Ammon Arana . I have examined the patient today and reviewed the discharge plan with the resident. I agree with the final assessment and plan, as noted in the  discharge summary. I have reviewed today's vital signs, medications, labs and imaging.        Ammon Rivers MD on 10/3/2022 at 5:42 PM         Appendix A: All Labs This Admission:     Results for orders placed or performed during the hospital encounter of 09/21/22   TSH with free T4 reflex     Status: Normal   Result Value Ref Range    TSH 0.42 0.40 - 4.00 mU/L   Basic metabolic panel     Status: Abnormal   Result Value Ref Range    Sodium 140 133 - 144 mmol/L    Potassium 3.2 (L) 3.4 - 5.3 mmol/L    Chloride 107 94 - 109 mmol/L    Carbon Dioxide (CO2) 26 20 - 32 mmol/L    Anion Gap 7 3 - 14 mmol/L    Urea Nitrogen 6 (L) 7 - 30 mg/dL    Creatinine 0.49 (L) 0.52 - 1.04 mg/dL    Calcium 8.8 8.5 - 10.1 mg/dL    Glucose 104 (H) 70 - 99 mg/dL    GFR Estimate >90 >60 mL/min/1.73m2   HCG qualitative urine (UPT)     Status: Normal   Result Value Ref Range    hCG Urine Qualitative Negative Negative   Asymptomatic COVID-19 Virus (Coronavirus) by PCR Nasopharyngeal     Status: Normal    Specimen: Nasopharyngeal; Swab   Result Value Ref Range    SARS CoV2 PCR Negative Negative    Narrative    Testing was performed using the Xpert Xpress SARS-CoV-2 Assay on the   Cepheid Gene-Xpert Instrument Systems. Additional information about   this Emergency Use Authorization (EUA) assay can be found via the Lab   Guide. This test should be ordered for the detection of SARS-CoV-2 in   individuals who meet SARS-CoV-2 clinical and/or epidemiological   criteria. Test performance is unknown in asymptomatic patients. This   test is for in vitro diagnostic use under the FDA EUA for   laboratories certified under CLIA to perform high complexity testing.   This test has not been FDA cleared or approved. A negative result   does not rule out the presence of PCR inhibitors in the specimen or   target RNA in concentration below the limit of detection for the   assay. The possibility of a false negative should be considered if   the  patient's recent exposure or clinical presentation suggests   COVID-19. This test was validated by the [facility]. This laboratory is certified under the Clinical Laboratory Improvement Amendments of 1988 (CLIA-88) as qualified to perform high complexity laboratory testing.     CBC with platelets and differential     Status: None   Result Value Ref Range    WBC Count 7.5 4.0 - 11.0 10e3/uL    RBC Count 4.63 3.80 - 5.20 10e6/uL    Hemoglobin 12.9 11.7 - 15.7 g/dL    Hematocrit 38.9 35.0 - 47.0 %    MCV 84 78 - 100 fL    MCH 27.9 26.5 - 33.0 pg    MCHC 33.2 31.5 - 36.5 g/dL    RDW 14.0 10.0 - 15.0 %    Platelet Count 220 150 - 450 10e3/uL    % Neutrophils 62 %    % Lymphocytes 30 %    % Monocytes 7 %    % Eosinophils 1 %    % Basophils 0 %    % Immature Granulocytes 0 %    NRBCs per 100 WBC 0 <1 /100    Absolute Neutrophils 4.6 1.6 - 8.3 10e3/uL    Absolute Lymphocytes 2.3 0.8 - 5.3 10e3/uL    Absolute Monocytes 0.5 0.0 - 1.3 10e3/uL    Absolute Eosinophils 0.1 0.0 - 0.7 10e3/uL    Absolute Basophils 0.0 0.0 - 0.2 10e3/uL    Absolute Immature Granulocytes 0.0 <=0.4 10e3/uL    Absolute NRBCs 0.0 10e3/uL   Drug abuse screen 1 urine (ED)     Status: Normal   Result Value Ref Range    Amphetamines Urine Screen Negative Screen Negative    Barbiturates Urine Screen Negative Screen Negative    Benzodiazepines Urine Screen Negative Screen Negative    Cannabinoids Urine Screen Negative Screen Negative    Cocaine Urine Screen Negative Screen Negative    Opiates Urine Screen Negative Screen Negative   CBC with platelets and differential     Status: None   Result Value Ref Range    WBC Count 5.1 4.0 - 11.0 10e3/uL    RBC Count 4.45 3.80 - 5.20 10e6/uL    Hemoglobin 12.3 11.7 - 15.7 g/dL    Hematocrit 38.1 35.0 - 47.0 %    MCV 86 78 - 100 fL    MCH 27.6 26.5 - 33.0 pg    MCHC 32.3 31.5 - 36.5 g/dL    RDW 13.6 10.0 - 15.0 %    Platelet Count 221 150 - 450 10e3/uL    % Neutrophils 48 %    %  Lymphocytes 39 %    % Monocytes 9 %    % Eosinophils 3 %    % Basophils 1 %    % Immature Granulocytes 0 %    NRBCs per 100 WBC 0 <1 /100    Absolute Neutrophils 2.5 1.6 - 8.3 10e3/uL    Absolute Lymphocytes 2.0 0.8 - 5.3 10e3/uL    Absolute Monocytes 0.4 0.0 - 1.3 10e3/uL    Absolute Eosinophils 0.2 0.0 - 0.7 10e3/uL    Absolute Basophils 0.0 0.0 - 0.2 10e3/uL    Absolute Immature Granulocytes 0.0 <=0.4 10e3/uL    Absolute NRBCs 0.0 10e3/uL   CBC with platelets differential     Status: None    Narrative    The following orders were created for panel order CBC with platelets differential.  Procedure                               Abnormality         Status                     ---------                               -----------         ------                     CBC with platelets and d...[[id number]]                      Final result                 Please view results for these tests on the individual orders.   Urine Drugs of Abuse Screen     Status: Normal    Narrative    The following orders were created for panel order Urine Drugs of Abuse Screen.  Procedure                               Abnormality         Status                     ---------                               -----------         ------                     Drug abuse screen 1 urin...[[id number]]  Normal              Final result                 Please view results for these tests on the individual orders.   CBC with Platelets & Differential     Status: None    Narrative    The following orders were created for panel order CBC with Platelets & Differential.  Procedure                               Abnormality         Status                     ---------                               -----------         ------                     CBC with platelets and d...[[id number]]                      Final result                 Please view results for these tests on the individual orders.

## 2022-11-09 ENCOUNTER — TELEPHONE (OUTPATIENT)
Dept: OBGYN | Facility: CLINIC | Age: 32
End: 2022-11-09

## 2022-11-09 NOTE — TELEPHONE ENCOUNTER
[facility] Call Center    Phone Message    May a detailed message be left on voicemail: yes     Reason for Call: Pt's  is requesting a call back to schedule for a new pregnancy.  He is unwilling to schedule an OB Intake appointment and said his wife needs to be seen in person this week.  Please call him back to discuss.  Thanks.    Action Taken: Message routed to:  [facility] p [phone]    Travel Screening: Not Applicable

## 2022-11-09 NOTE — TELEPHONE ENCOUNTER
Attempted to call pt to discuss further: like when was her LMP, what hospital she wants to deliver at, is she having concerns that she wants to be seen this week, etc.    Unable to reach patient via phone. Left message to call back at [phone].    Chani Santos RN

## 2022-11-09 NOTE — TELEPHONE ENCOUNTER
Patient did see Dr. Ku for her last pregnancy.  Soonest available is 12/1 with any provider.  Wants to be seen this week.      Palak Moya, CMA  November 9, 2022

## 2022-11-10 NOTE — TELEPHONE ENCOUNTER
Unable to reach patient via phone. RN left a message and instructed patient to call the clinic at [phone].    RN also sent [employer] message.    Shantal Crockett RN on 11/10/2022 at 9:14 AM

## 2022-11-11 NOTE — TELEPHONE ENCOUNTER
Pt has not read [employer] message. RN called and left detailed message to call back so we can further assist her.    Shantal Crockett RN on 11/11/2022 at 9:30 AM

## 2022-12-07 ENCOUNTER — PRENATAL OFFICE VISIT (OUTPATIENT)
Dept: NURSING | Facility: CLINIC | Age: 32
End: 2022-12-07
Payer: COMMERCIAL

## 2022-12-07 ENCOUNTER — TELEPHONE (OUTPATIENT)
Dept: OBGYN | Facility: CLINIC | Age: 32
End: 2022-12-07

## 2022-12-07 VITALS — BODY MASS INDEX: 26.96 KG/M2 | HEIGHT: 57 IN

## 2022-12-07 DIAGNOSIS — Z34.90 ENCOUNTER FOR SUPERVISION OF NORMAL PREGNANCY: Primary | ICD-10-CM

## 2022-12-07 DIAGNOSIS — Z23 NEED FOR TDAP VACCINATION: ICD-10-CM

## 2022-12-07 DIAGNOSIS — O21.9 NAUSEA AND VOMITING IN PREGNANCY: Primary | ICD-10-CM

## 2022-12-07 DIAGNOSIS — R52 MILD PAIN: ICD-10-CM

## 2022-12-07 PROBLEM — B96.20 E. COLI UTI: Status: ACTIVE | Noted: 2020-01-13

## 2022-12-07 PROBLEM — N39.0 E. COLI UTI: Status: ACTIVE | Noted: 2020-01-13

## 2022-12-07 PROCEDURE — 99207 PR NO CHARGE NURSE ONLY: CPT

## 2022-12-07 RX ORDER — MULTIVITAMIN WITH IRON
100 TABLET ORAL 3 TIMES DAILY
Qty: 90 TABLET | Refills: 4 | Status: SHIPPED | OUTPATIENT
Start: 2022-12-07

## 2022-12-07 RX ORDER — ACETAMINOPHEN 325 MG/1
325-650 TABLET ORAL EVERY 6 HOURS PRN
Qty: 90 TABLET | Refills: 0 | Status: ON HOLD | OUTPATIENT
Start: 2022-12-07 | End: 2023-06-20

## 2022-12-07 NOTE — PROGRESS NOTES
Important Information for Provider:     New ob nurse intake by phone, third pregnancy. Recent pregnancy 11/2021. IUFD 7/2020 at 36 weeks( [facility]). Patient requesting B6, Unisom, Tylenol to the updated pharmacy, TE sent to Dr Neves. Handouts reviewed. Ultrasound and NOB with Dr Neves 12/29/2022  TSH ordered with NOB labs    Prenatal OB Questionnaire  Patient supplied answers from flow sheet for:  Prenatal OB Questionnaire.  Past Medical History  Have you ever received care for your mental health? : (!) Yes  Have you ever been in a major accident or suffered serious trauma?: (!) Yes IUFD  Within the last year, has anyone hit, slapped, kicked or otherwise hurt you?: No  In the last year, has anyone forced you to have sex when you didn't want to?: No    Past Medical History 2   Have you ever received a blood transfusion?: No  Would you accept a blood transfusion if was medically recommended?: Yes  Does anyone in your home smoke?: No   Is your blood type Rh negative?: No  Have you ever ?: (!) Yes  Have you been hospitalized for a nonsurgical reason excluding normal delivery?: No  Have you ever had an abnormal pap smear?: No    Past Medical History (Continued)  Do you have a history of abnormalities of the uterus?: No  Did your mother take GIOVANNI or any other hormones when she was pregnant with you?: No  Do you have any other problems we have not asked about which you feel may be important to this pregnancy?: No                     Allergies as of 12/7/2022:    Allergies as of 12/07/2022     (No Known Allergies)       Current medications are:  Current Outpatient Medications   Medication Sig Dispense Refill     Prenatal Vit-DSS-Fe Cbn-FA (PRENATAL AD PO)        OLANZapine (ZYPREXA) 10 MG tablet Take 1 tablet (10 mg) by mouth At Bedtime (Patient not taking: Reported on 12/7/2022) 30 tablet 0         Early ultrasound screening tool:    Does patient have irregular periods?  No  Did patient use hormonal birth control  in the three months prior to positive urine pregnancy test? No  Is the patient breastfeeding?  No  Is the patient 10 weeks or greater at time of education visit?  No

## 2022-12-07 NOTE — PHARMACY-ADMISSION MEDICATION HISTORY
[facility]         HOSPITALIST  DEATH SUMMARY    Patient Name: Lulú Barkley  : 1958  MRN: [id number]    Date of Admission: 2022  Date of Death:  22 at 0339  Primary Care Physician: Jordan Nixon MD    Consults     Date and Time Order Name Status Description    2022  9:26 AM Inpatient Neurology Consult General      2022  8:25 AM Inpatient Cardiology Consult Completed     2022  5:25 AM Inpatient Pulmonology Consult Completed     2022 10:42 PM Inpatient Hospitalist Consult            Active and Resolved Hospital Problems:  Active Hospital Problems    Diagnosis POA   • **Cardiac arrest (HCC) [I46.9] Yes      Resolved Hospital Problems   No resolved problems to display.   Out of hospital cardiac arrest  V. tach/V. fib arrest  Cardiogenic shock  NSTEMI  Severe anoxic brain injury  Severe three-vessel coronary disease, scheduled for CABG in 2 weeks  Metabolic acidosis  Lactic acidosis  Respiratory acidosis  Acute hypoxemic and hypercapnic respiratory failure requiring mechanical ventilation  Acute cardiogenic pulmonary edema  NSTEMI  Transaminitis  SIMEON  Morbid obesity with BMI 48.7 kg/M2, clinically significant    Hospital Course     Hospital Course:  Lulú Barkley is a 64 y.o. female  with a past medical history of coronary artery disease s/p 2 stents in LAD, 2 stents in the RCA in  and , scheduled for CABG in 2 weeks, hypertension, hyperlipidemia has been brought into the ED by EMS after cardiac arrest.  Patient was in the bathroom and then family members heard the thump and immediately went into the bathroom and found her to be unresponsive in the bathtub by her , he started the CPR and called the EMS around 8:48 PM.  She received 6 doses of epi and 1 bicarb in route to the hospital, 2 doses of epi 1 bicarb and 1 calcium chloride after she arrived to the hospital.  ROSC was achieved.  Had V. tach/V. fib after ROSC was achieved for which  Admission Medication History Completed by Pharmacy    See [facility] Admission Navigator for allergy information, preferred outpatient pharmacy, prior to admission medications and immunization status.     Medication history sources:  pt interview    Pertinent changes made to PTA medication list:  Added: none  Deleted:   - trazodone, quetiapine  Changed: none    Additional medication history information:   - Patient denies taking/being prescribed prescription medications and over-the-counter (OTC) products such as vitamins, sleep aids, etc.      Prior to Admission medications    Not on File     Date completed: 09/22/22    Medication history completed by:   Mu Pal, PharmD, BCPS, BCPP   amiodarone drip was started.  Admitted to the ICU, required epinephrine, vasopressin, started on amiodarone and heparin drips.  Cardiology and pulmonology consulted.  Preliminary neurologic exam revealed the patient had severe anoxic brain injury and likely brain death.  Urine output ceased.  Palliative care consulted and family ultimately made the decision to withdraw care and compassionately extubate the patient.  She was extubated and  on 2022 at 0339.    Electronically signed by Brandon Borges MD, 22, 7:38 AM EST.

## 2022-12-28 LAB
ABO/RH(D): NORMAL
ANTIBODY SCREEN: NEGATIVE
SPECIMEN EXPIRATION DATE: NORMAL

## 2022-12-29 ENCOUNTER — ANCILLARY PROCEDURE (OUTPATIENT)
Dept: ULTRASOUND IMAGING | Facility: CLINIC | Age: 32
End: 2022-12-29
Payer: COMMERCIAL

## 2022-12-29 ENCOUNTER — PRENATAL OFFICE VISIT (OUTPATIENT)
Dept: OBGYN | Facility: CLINIC | Age: 32
End: 2022-12-29
Payer: COMMERCIAL

## 2022-12-29 VITALS
DIASTOLIC BLOOD PRESSURE: 60 MMHG | SYSTOLIC BLOOD PRESSURE: 118 MMHG | BODY MASS INDEX: 25.1 KG/M2 | OXYGEN SATURATION: 98 % | WEIGHT: 116 LBS | HEART RATE: 58 BPM

## 2022-12-29 DIAGNOSIS — Z86.59 HISTORY OF PSYCHOSIS: ICD-10-CM

## 2022-12-29 DIAGNOSIS — O09.299 HISTORY OF GESTATIONAL DIABETES IN PRIOR PREGNANCY, CURRENTLY PREGNANT: ICD-10-CM

## 2022-12-29 DIAGNOSIS — K08.89 TOOTH PAIN: ICD-10-CM

## 2022-12-29 DIAGNOSIS — O21.9 NAUSEA AND VOMITING IN PREGNANCY: ICD-10-CM

## 2022-12-29 DIAGNOSIS — O09.899 SHORT INTERVAL BETWEEN PREGNANCIES COMPLICATING PREGNANCY, ANTEPARTUM: ICD-10-CM

## 2022-12-29 DIAGNOSIS — Z34.90 ENCOUNTER FOR SUPERVISION OF NORMAL PREGNANCY: ICD-10-CM

## 2022-12-29 DIAGNOSIS — E05.90 HYPERTHYROIDISM: ICD-10-CM

## 2022-12-29 DIAGNOSIS — Z86.32 HISTORY OF GESTATIONAL DIABETES IN PRIOR PREGNANCY, CURRENTLY PREGNANT: ICD-10-CM

## 2022-12-29 DIAGNOSIS — O09.92 HIGH-RISK PREGNANCY IN SECOND TRIMESTER: Primary | ICD-10-CM

## 2022-12-29 DIAGNOSIS — Z34.82 ENCOUNTER FOR SUPERVISION OF OTHER NORMAL PREGNANCY IN SECOND TRIMESTER: ICD-10-CM

## 2022-12-29 DIAGNOSIS — Z86.59 HISTORY OF MANIA: ICD-10-CM

## 2022-12-29 DIAGNOSIS — Z87.59 HISTORY OF IUFD: ICD-10-CM

## 2022-12-29 LAB
ALBUMIN UR-MCNC: NEGATIVE MG/DL
APPEARANCE UR: CLEAR
BILIRUB UR QL STRIP: NEGATIVE
COLOR UR AUTO: YELLOW
ERYTHROCYTE [DISTWIDTH] IN BLOOD BY AUTOMATED COUNT: 13.6 % (ref 10–15)
GLUCOSE UR STRIP-MCNC: NEGATIVE MG/DL
HBA1C MFR BLD: 5.1 % (ref 0–5.6)
HCT VFR BLD AUTO: 35.5 % (ref 35–47)
HGB BLD-MCNC: 12 G/DL (ref 11.7–15.7)
HGB UR QL STRIP: NEGATIVE
KETONES UR STRIP-MCNC: 15 MG/DL
LEUKOCYTE ESTERASE UR QL STRIP: NEGATIVE
MCH RBC QN AUTO: 28.8 PG (ref 26.5–33)
MCHC RBC AUTO-ENTMCNC: 33.8 G/DL (ref 31.5–36.5)
MCV RBC AUTO: 85 FL (ref 78–100)
NITRATE UR QL: NEGATIVE
PH UR STRIP: 8 [PH] (ref 5–7)
PLATELET # BLD AUTO: 202 10E3/UL (ref 150–450)
RBC # BLD AUTO: 4.17 10E6/UL (ref 3.8–5.2)
SP GR UR STRIP: 1.02 (ref 1–1.03)
T4 FREE SERPL-MCNC: 1.71 NG/DL (ref 0.9–1.7)
TSH SERPL DL<=0.005 MIU/L-ACNC: <0.01 UIU/ML (ref 0.3–4.2)
UROBILINOGEN UR STRIP-ACNC: 1 E.U./DL
WBC # BLD AUTO: 5 10E3/UL (ref 4–11)

## 2022-12-29 PROCEDURE — 81003 URINALYSIS AUTO W/O SCOPE: CPT | Performed by: OBSTETRICS & GYNECOLOGY

## 2022-12-29 PROCEDURE — 83036 HEMOGLOBIN GLYCOSYLATED A1C: CPT | Performed by: OBSTETRICS & GYNECOLOGY

## 2022-12-29 PROCEDURE — 99214 OFFICE O/P EST MOD 30 MIN: CPT | Performed by: OBSTETRICS & GYNECOLOGY

## 2022-12-29 PROCEDURE — 86762 RUBELLA ANTIBODY: CPT | Performed by: OBSTETRICS & GYNECOLOGY

## 2022-12-29 PROCEDURE — 87086 URINE CULTURE/COLONY COUNT: CPT | Performed by: OBSTETRICS & GYNECOLOGY

## 2022-12-29 PROCEDURE — 76801 OB US < 14 WKS SINGLE FETUS: CPT | Performed by: OBSTETRICS & GYNECOLOGY

## 2022-12-29 PROCEDURE — 87389 HIV-1 AG W/HIV-1&-2 AB AG IA: CPT | Performed by: OBSTETRICS & GYNECOLOGY

## 2022-12-29 PROCEDURE — 86780 TREPONEMA PALLIDUM: CPT | Performed by: OBSTETRICS & GYNECOLOGY

## 2022-12-29 PROCEDURE — 86900 BLOOD TYPING SEROLOGIC ABO: CPT | Performed by: OBSTETRICS & GYNECOLOGY

## 2022-12-29 PROCEDURE — 87340 HEPATITIS B SURFACE AG IA: CPT | Performed by: OBSTETRICS & GYNECOLOGY

## 2022-12-29 PROCEDURE — 36415 COLL VENOUS BLD VENIPUNCTURE: CPT | Performed by: OBSTETRICS & GYNECOLOGY

## 2022-12-29 PROCEDURE — 86850 RBC ANTIBODY SCREEN: CPT | Performed by: OBSTETRICS & GYNECOLOGY

## 2022-12-29 PROCEDURE — 84439 ASSAY OF FREE THYROXINE: CPT | Performed by: OBSTETRICS & GYNECOLOGY

## 2022-12-29 PROCEDURE — 86901 BLOOD TYPING SEROLOGIC RH(D): CPT | Performed by: OBSTETRICS & GYNECOLOGY

## 2022-12-29 PROCEDURE — 86803 HEPATITIS C AB TEST: CPT | Performed by: OBSTETRICS & GYNECOLOGY

## 2022-12-29 PROCEDURE — 85027 COMPLETE CBC AUTOMATED: CPT | Performed by: OBSTETRICS & GYNECOLOGY

## 2022-12-29 PROCEDURE — 84443 ASSAY THYROID STIM HORMONE: CPT | Performed by: OBSTETRICS & GYNECOLOGY

## 2022-12-29 RX ORDER — ACETAMINOPHEN 500 MG
500-1000 TABLET ORAL EVERY 6 HOURS PRN
Qty: 90 TABLET | Refills: 4 | Status: ON HOLD | OUTPATIENT
Start: 2022-12-29 | End: 2023-06-20

## 2022-12-29 RX ORDER — METOCLOPRAMIDE 5 MG/1
5 TABLET ORAL
Qty: 90 TABLET | Refills: 4 | Status: SHIPPED | OUTPATIENT
Start: 2022-12-29

## 2022-12-29 NOTE — LETTER
December 29, 2022    Regarding: Ema Rojas  YOB: 1990  [address]   [facility] [address]    To Whom It May Concern:     Routine teeth cleaning, use of Novocaine and dental x-rays with use of lead apron are safe during all stages of pregnancy. Antibiotics that are category B and narcotic pain medications can also be safely used during all stages of pregnancy. Examples of these include, but are not limited to: amoxicillin, penicillin, Norco, Tylenol #3 and Percocet. Root canals are safe during all trimesters of pregnancy.    Please use these guidelines when treating our patients. If you have additional questions or concerns, please call us at [phone].    Sincerely,        Alyse Neves MD

## 2022-12-29 NOTE — PROGRESS NOTES
OB - New OB History and Physical    HPI: Ema Rojas is a 32 year old  at 14w5d as dated by LMP c/w 14w US.   Estimated Date of Delivery: 2023.     and son present with her today.    Since becoming pregnant, patient reports she's been feeling nauseated.  Would like medication for this.  No bleeding or pain.    Short interval pregnancy.  Last delivery 21.  Pregnancy complicated by GDM (inconsistent checking, unclear control) and polyhydramnios.    History of term IUFD 2020.  Placenta path showed umbilical cord hematoma, but per [facility] consult, literature doesn't consistently associate this with stillbirth.  ? Of GDM during this pregnancy due to LGA and suspected poly.    Admitted for pp psychosis after IUFD and for psychosis/ani following second delivery.  Another  admission in 2022 for psychosis and ani.  Was prescribed Zyprexa most recently and, based on chart review, safety for pregnancy was discussed at that time, but she self d/c'ed due to pregnancy.  Has never had outpatient follow-up for mental health and denies any issues with mental health today.    Hyperthyroid.  Has seen endocrine previously, last pregnancy.  No follow-up since then.    Tooth pain x 1 month.  Bottom right.  One tooth removed on left previously.  Wondering if holes in this tooth.  Cavity?  Only bothersome at night.  Has appointment at outside dental clinic in Feb, but wondering if could be seen sooner.    Ultrasound: today  Bains viable intrauterine pregnancy with growth at 14w 0d by CRL (+/- 7-10 days) which is consistent with menstrual dating, Estimated due date remains 2023.    Obstetric history:     OB History    Para Term  AB Living   3 2 2 0 0 1   SAB IAB Ectopic Multiple Live Births   0 0 0 0 1      # Outcome Date GA Lbr Polo/2nd Weight Sex Delivery Anes PTL Lv   3 Current            2 Term 21 37w6d  3.515 kg (7 lb 12 oz) M  EPI  PADMINI      Complications:  Gestational diabetes      Name: Javier Morales Term 07/20/20 38w0d   F I.U. FETAL D   FD         Gynecologic History:   Menstrual Interval: regular  Patient's last menstrual period was 09/17/2022.   STI history: denies  Last Pap: 1/13/2020  History of abnormal pap: denies    Allergy: Patient has no known allergies.  Patient denies food, latex or environmental allergies.     Current Medications:  Current Outpatient Medications   Medication     acetaminophen (TYLENOL) 325 MG tablet     doxylamine (UNISOM) 25 MG TABS tablet     Prenatal Vit-DSS-Fe Cbn-FA (PRENATAL AD PO)     vitamin B6 (PYRIDOXINE) 100 MG tablet     OLANZapine (ZYPREXA) 10 MG tablet     No current facility-administered medications for this visit.       Past Medical History:  Past Medical History:   Diagnosis Date     E. coli UTI 1/13/2020     Hyperthyroidism     she has seen endocrinology 03/2021     Iron deficiency anemia secondary to inadequate dietary iron intake 06/15/2020     Prior pregnancy with fetal demise 07/2020       Past Surgical History:  Past Surgical History:   Procedure Laterality Date     NO HISTORY OF SURGERY         Social History:  Patient lives with , son.  Patient's relationship status is: .    Works as [employer].   Denies current tobacco, alcohol or recreational drug use.       Family History:  Family History   Problem Relation Age of Onset     No Known Problems Mother      No Known Problems Father      No Known Problems Maternal Grandmother      No Known Problems Maternal Grandfather      No Known Problems Paternal Grandmother      No Known Problems Paternal Grandfather      No Known Problems Brother      No Known Problems Sister      No Known Problems Brother      No Known Problems Sister      No Known Problems Sister      No Known Problems Sister        Review of Systems  Gen:  no change in weight, no fever, no chills, no fatigue  CV: no palpitations, no chest pain, no hypertension, no syncope  Resp: no shortness of  breath, no cough, no wheezing, no asthma  GI: some nausea, rare vomiting, no diarrhea, no constipation, no bloating, no GERD   no vaginal discharge, no dysuria, no abnormal bleeding, no pelvic pain   Endo: no thyroid problems, no cold/heat intolerance, no acne, no hirsutism, no diabetes  Heme: no easy bruising or bleeding, no history of DVT/PE/CVA  Neuro: no headaches, no seizures, no strokes, no focal deficits      Physical Exam:  Vitals:    22 1123   BP: 118/60   Pulse: 58   SpO2: 98%   Weight: 52.6 kg (116 lb)     Body mass index is 25.1 kg/m .  Gen: alert, oriented, no distress,  pleasant, appears stated age, appropriately groomed  Neck: supple, trachea midline, no thyromegaly, no lymphadenopathy  HEENT: head normocephalic, atraumatic, normal oropharynx without erythema or exudates  CV: normal heart sounds, regular rate and rhythm, no murmurs  Resp: good inspiratory effort, lungs clear to ascultation bilaterally, no wheezes or rhonchi  Abd: soft, nontender, nondistended  : deferred  Extr: warm, well perfused, nontender, no edema  Psych: affect bright, cooperative, responds appropriately      Assessment:  Ema Rojas is a 32 year old  at 14w5d presenting to establish prenatal care.    Problem List:   1. High-risk pregnancy in second trimester  - [facility] Referral - Pregnancy; Future  - Adult Mental Health  Referral; Future    2. History of IUFD  Unclear cause.  Based on documentation and previous MFM consult, more likely to be related to uncontrolled GDM than cord accident.    - [facility] Referral - Pregnancy; Future    3. History of gestational diabetes in prior pregnancy, currently pregnant  Concerned about ability to complete early 1 hour glucose test, so decided to order A1c instead/  - Hemoglobin A1c; Future  - [facility]etal [facility]tr Referral - Pregnancy; Future  - Hemoglobin A1c    4. History of psychosis  5. History of ani  Discussed safety of Zyprexa in pregnancy  and recommendation to take this medication.  Little/no insight into previous hospitalizations during visit.  Discussed that I would recommend seeing a doctor for mental health concerns outside of the hospital to prevent them from having to go to the ER and be admitted when struggles arise.  Patient and her  did express understanding of this and the reason for referral.  Do not suspect she will restart Zyprexa despite my recommendation.  - Adult Mental Health  Referral; Future    6. Short interval between pregnancies complicating pregnancy, antepartum  - [facility] Referral - Pregnancy; Future    7. Hyperthyroidism  Thyroid labs checked with prenatal labs.  TSH very suppressed but T4 only mildly elevated.  Endocrine referral placed.  - [facility] Referral - Pregnancy; Future  - Adult Endocrinology  Referral; Future    8. Tooth pain  Recommended Tylenol and placed dental referral to see if they could get her in sooner here.  Provided dental letter just in case.  - acetaminophen (TYLENOL) 500 MG tablet; Take 1-2 tablets (500-1,000 mg) by mouth every 6 hours as needed for mild pain  Dispense: 90 tablet; Refill: 4  - Dental Referral; Future    9. Nausea and vomiting in pregnancy  - metoclopramide (REGLAN) 5 MG tablet; Take 1 tablet (5 mg) by mouth 4 times daily (before meals and nightly)  Dispense: 90 tablet; Refill: 4    10. Encounter for supervision of other normal pregnancy in second trimester  - ABO/Rh type and screen  - Hepatitis B surface antigen  - CBC with platelets  - HIV Antigen Antibody Combo  - Rubella Antibody IgG Quantitative  - Treponema Abs w Reflex to RPR and Titer  - Urine Culture Aerobic Bacterial  - Hepatitis C antibody  - UA without Microscopic - lab collect  - TSH with free T4 reflex  - T4 free      Plan:  1. Reviewed routine prenatal care. Discussed MD call schedule as well as role of residents and med students both in clinic and hospital.  She is okay with  resident care, although wondering about seeing Dr. Ku, as his office is closer to her home.  Discussed if seeing Dr. Ku, should plan for delivery at [place].  Given their interest in delivering here, I did let them know Phillip Hogan and Toño see patients in [place].  They were given info about how to schedule with either clinic prior to leaving today.  2. Pap: UTD   3. Diet, Nutrition and Exercise:  Continue PNVs. Continue normal exercise. Her prepregnancy BMI is 26.  According to the WHO guidelines, patient is given a goal of gaining approximately 15-25 pounds during the course of her pregnancy.    4. Immunizations: plan TdaP at 28 weeks  5. Fetal anomaly screening: discussed, declines  6. Routine Prenatal Care: the patient will return to clinic in 4 weeks and prn    Alyse Neves MD    -------------  In addition to routine prenatal care/new ob discussion, >30 minutes spent on discussing previous pregnancy complications and mental health admissions, implications on current care and recommendations for follow-up.

## 2022-12-29 NOTE — PATIENT INSTRUCTIONS
The doctors I work with and who deliver at [place] that work at [facility]:  Dr Mely Lundberg and Dr. Patience Hogan.

## 2022-12-30 ENCOUNTER — TRANSCRIBE ORDERS (OUTPATIENT)
Dept: MATERNAL FETAL MEDICINE | Facility: CLINIC | Age: 32
End: 2022-12-30

## 2022-12-30 DIAGNOSIS — O26.90 PREGNANCY RELATED CONDITION, ANTEPARTUM: Primary | ICD-10-CM

## 2022-12-30 LAB
HBV SURFACE AG SERPL QL IA: NONREACTIVE
HCV AB SERPL QL IA: NONREACTIVE
HIV 1+2 AB+HIV1 P24 AG SERPL QL IA: NONREACTIVE
RUBV IGG SERPL QL IA: 2.68 INDEX
RUBV IGG SERPL QL IA: POSITIVE
T PALLIDUM AB SER QL: NONREACTIVE

## 2022-12-31 LAB — BACTERIA UR CULT: NORMAL

## 2023-01-03 NOTE — TELEPHONE ENCOUNTER
RECORDS RECEIVED FROM: internal    DATE RECEIVED: 1.10.23    NOTES (FOR ALL VISITS) STATUS DETAILS   OFFICE NOTES from referring provider internal  Dr Neves   OFFICE NOTES from other specialist internal  11.16.21 Wiggins       MEDICATION LIST internal     IMAGING        ULTRASOUND (HEAD/NECK) internal  3.11.21    LABS     DIABETES: HBGA1C, CREATININE, FASTING LIPIDS, MICROALBUMIN URINE, POTASSIUM, TSH, T4    THYROID: TSH, T4, CBC, THYRODLONULIN, TOTAL T3, FREE T4, CALCITONIN, CEA internal  T4/TSH- 12.29.22   HBGA1C- 12.29.22  Cbc- 12.29.22  POTASSIUM- 1.31.22  cmp- 1.29.22

## 2023-01-10 ENCOUNTER — PRE VISIT (OUTPATIENT)
Dept: ENDOCRINOLOGY | Facility: CLINIC | Age: 33
End: 2023-01-10

## 2023-01-10 ENCOUNTER — VIRTUAL VISIT (OUTPATIENT)
Dept: ENDOCRINOLOGY | Facility: CLINIC | Age: 33
End: 2023-01-10
Attending: OBSTETRICS & GYNECOLOGY
Payer: COMMERCIAL

## 2023-01-10 DIAGNOSIS — E05.90 THYROTOXICOSIS DURING PREGNANCY: Primary | ICD-10-CM

## 2023-01-10 DIAGNOSIS — O99.280 THYROTOXICOSIS DURING PREGNANCY: Primary | ICD-10-CM

## 2023-01-10 PROCEDURE — 99215 OFFICE O/P EST HI 40 MIN: CPT | Mod: 95 | Performed by: INTERNAL MEDICINE

## 2023-01-10 NOTE — PROGRESS NOTES
Ema Rojas is being evaluated via a billable video visit.        How would you like to obtain your AVS? OB10  For the video visit, send the invitation by: Text to cell phone: [phone]  Will anyone else be joining your video visit? No

## 2023-01-10 NOTE — LETTER
1/10/2023       RE: Ema Rojas  [address]  [facility] [address]     Dear Colleague,    Thank you for referring your patient, Ema Rojas, to the [facility] [place] at [facility]. Please see a copy of my visit note below.    Video-Visit Details    Type of service:  Video Visit    Video Start Time (time video started): 10:04 am  Video End Time (time video stopped): 10:32 am     Originating Location (pt. Location): Home  Distant Location (provider location): Off-site    Mode of Communication:  Video Conference via Dapper    [facility]     Ema Rojas is a 32 year old female with a past medical history significant for transient hyperthyroidism in March 2021, which resolved during her pregnancy.  She found out she is pregnant in November 2022, when she established GYN clinic.  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  Clinically, the patient does not endorse signs or symptoms suggestive of thyrotoxicosis.  Of note that she had an upper respiratory infection in November 2022.  She is now 16 weeks pregnant.  She has no symptoms or signs suggestive of Graves' ophthalmopathy or thyromegaly.    Differential diagnosis:  Subacute thyroiditis versus Graves' disease.  Toxic adenoma or toxic multinodular goiter less likely.     Plan:  Recheck TFTs, including total T3  Check TSI, thyroid peroxidase and antithyroglobulin antibodies  Follow-up to be determined based on the above lab results.  The patient expresses her wish of not having to take medications during pregnancy unless really necessary.  Discussed about the possibility of her having Graves' disease, which might require treatment with methimazole.  Counseled on side effects of this medication, with GI symptoms being more common but also discussed about very rare side effects like agranulocytosis, anemia, thrombocytopenia or fulminant  hepatitis.      Orders Placed This Encounter   Procedures     TSH     T4 free     T3 total     Thyroid stimulating immunoglobulin     Thyroid peroxidase antibody     Anti thyroglobulin antibody       The patient is seen in consultation at the request of Dr. Alyse Neves.     History of Present Illness:  Ema Rojas is a 32 year old female previously evaluated by Dr. Rosado, in March 2021, when she presented for evaluation of abnormal TFTs, checked as part of her work-up for irregular menses.  The patient was first told she has abnormal thyroid hormone levels in July 2020, when she delivered a stillborn baby at 36 weeks.  There is a questionable history of gestational diabetes with this pregnancy.  In November 2020, she was admitted for postpartum psychosis.  In March 2021, TSH was undetectable and free T4 was normal at 1.25. The thyroid ultrasound done at that time revealed a normal thyroid gland size, measuring 4.7 x 1.3 x 1.9 cm on the right and 4.4 x 1.7 x 1.5 cm on the left, with slightly heterogeneous echotexture and mildly increased vascularity.  I personally reviewed the ultrasound images.  She got pregnant in April 2021.    On subsequent lab work from September 2021, TSH improved at 0.21, while free T4 remained in the normal range at 0.98.  The patient delivered on November 20, 2021, a healthy baby boy.  Breast-fed for 3 months.  In January 2022, she was admitted for worsening psychosis and manic symptoms and she was discharged on Abilify and trazodone.  Patient compliance with treatment is questionable.  In September 2022, the patient complained of difficulty sleeping, which in the past has triggered the episodes of psychosis.  She was admitted on 9/29/2022, with an acute episode of psychosis and discharged on olanzapine.  In January 2022 and September 2022, TSH was normal at 1.39 and 0.42, respectively.  She found out she is pregnant in November 2022, when she established GYN care.    On lab  work from 12/29/2022, TSH was undetectable and free T4 was elevated at 1.71.  LMP: 09/17/22          EDC: 6/24/23 12/29/22 A1c 5.1%    She denies taking any over-the-counter supplements with the exception of prenatal multivitamins and Tylenol, which she took for a recent tooth pain.  She is now 16 weeks pregnant.  She did lost some weight in the first trimester and the same thing happened with her prior pregnancy.  She did experience nausea and vomiting up until last week, when these symptoms resolved.    Ebise denies any neck enlargement, dysphagia, voice hoarseness, cough, eye symptoms.  In November 2022 she reports experiencing upper respiratory symptoms of runny nose, headaches, suggestive of a viral infection. No associated neck tenderness.     Her heart rate was 80 at the time of the visit (the patient checked it).    Past Medical History   Past Medical History:   Diagnosis Date     E. coli UTI 1/13/2020     Hyperthyroidism     she has seen endocrinology 03/2021     Iron deficiency anemia secondary to inadequate dietary iron intake 06/15/2020     Prior pregnancy with fetal demise 07/2020   Psychosis      Past Surgical History   Past Surgical History:   Procedure Laterality Date     NO HISTORY OF SURGERY         Current Medications    Current Outpatient Medications:      acetaminophen (TYLENOL) 325 MG tablet, Take 1-2 tablets (325-650 mg) by mouth every 6 hours as needed for mild pain, Disp: 90 tablet, Rfl: 0     acetaminophen (TYLENOL) 500 MG tablet, Take 1-2 tablets (500-1,000 mg) by mouth every 6 hours as needed for mild pain, Disp: 90 tablet, Rfl: 4     doxylamine (UNISOM) 25 MG TABS tablet, Take 0.5 tablets (12.5 mg) by mouth At Bedtime, Disp: 30 tablet, Rfl: 4     metoclopramide (REGLAN) 5 MG tablet, Take 1 tablet (5 mg) by mouth 4 times daily (before meals and nightly), Disp: 90 tablet, Rfl: 4     OLANZapine (ZYPREXA) 10 MG tablet, Take 1 tablet (10 mg) by mouth At Bedtime, Disp: 30 tablet, Rfl: 0      Prenatal Vit-DSS-Fe Cbn-FA (PRENATAL AD PO), , Disp: , Rfl:      vitamin B6 (PYRIDOXINE) 100 MG tablet, Take 1 tablet (100 mg) by mouth 3 times daily, Disp: 90 tablet, Rfl: 4    Family History:   Nonsignificant. Father  in an accident.   Originally from [place]. In [place].     Social History  , one child. She denies smoking, drinking alcohol or using illicit drugs. Occupation: unemployed.     Review of Systems   Systemic:             As above  Eye:                      No eye symptoms   Tonia-Laryngeal:     No tonia-laryngeal symptoms, no dysphagia, no hoarseness, no cough     Breast:                  No breast symptoms  Cardiovascular:    No cardiovascular symptoms, no CP or palpitations   Pulmonary:           No pulmonary symptoms, no SOB or cough    Gastrointestinal:   No gastrointestinal symptoms, no diarrhea or constipation   Genitourinary:       No genitourinary symptoms, no increased thirst or urination; at times she feels thirsty; she does not wake up at night to use the restroom  Endocrine:            No endocrine symptoms, no cold or heat intolerance; no increased sweating    Neurological:        No neurological symptoms, no headaches, no tremor, no numbness or tingling sensation, no dizziness   Musculoskeletal:  No musculoskeletal symptoms, no muscle or joint pain   Skin:                     No skin symptoms, no dry skin, no hair falling out   Psychological:     No psychological symptoms; no difficulty sleeping                  Vital Signs     Previous Weights:    Wt Readings from Last 10 Encounters:   22 52.6 kg (116 lb)   22 56.5 kg (124 lb 9.6 oz)   22 56.3 kg (124 lb 3.2 oz)   22 55.4 kg (122 lb 1.6 oz)   22 59.1 kg (130 lb 3.2 oz)   21 62.7 kg (138 lb 3.2 oz)   11/10/21 63.1 kg (139 lb 3.2 oz)   21 63.9 kg (140 lb 12.8 oz)   10/19/21 62.8 kg (138 lb 6.4 oz)   10/13/21 62.8 kg (138 lb 6.4 oz)        LMP 2022     Physical Exam  General  Appearance: alert, no distress noted   Eyes: grossly normal to inspection, conjunctivae and sclerae normal, no lid lag or stare   Neck: no visible thyromegaly   Respiratory: no audible wheeze, cough, or visible cyanosis.  No visible retractions or increased work of breathing.  Able to speak fully in complete sentences.  Neurological: Cranial nerves grossly intact, mentation intact and speech normal; no tremor of the hands   Skin: no lesions on exposed skin   Psychological: mentation appears normal, affect normal, judgement and insight intact, normal speech and appearance well-groomed     Lab Results  I reviewed prior lab results documented in Epic.  TSH   Date Value Ref Range Status   12/29/2022 <0.01 (L) 0.30 - 4.20 uIU/mL Final   09/21/2022 0.42 0.40 - 4.00 mU/L Final   01/29/2022 1.39 0.40 - 4.00 mU/L Final   09/20/2021 0.21 (L) 0.40 - 4.00 mU/L Final   03/08/2021 <0.01 (L) 0.40 - 4.00 mU/L Final     55 minutes spent on the date of the encounter doing chart review, history and exam, documentation and further activities as noted above.                           Ema Rojas is being evaluated via a billable video visit.        How would you like to obtain your AVS? MyChart  For the video visit, send the invitation by: Text to cell phone: [phone]  Will anyone else be joining your video visit? No      Sincerely,    Marge Sandoval MD

## 2023-01-10 NOTE — PROGRESS NOTES
Video-Visit Details    Type of service:  Video Visit    Video Start Time (time video started): 10:04 am  Video End Time (time video stopped): 10:32 am     Originating Location (pt. Location): Home  Distant Location (provider location): Off-site    Mode of Communication:  Video Conference via [facility]     Ema Rojas is a 32 year old female with a past medical history significant for transient hyperthyroidism in March 2021, which resolved during her pregnancy.  She found out she is pregnant in November 2022, when she established GYN clinic.  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  Clinically, the patient does not endorse signs or symptoms suggestive of thyrotoxicosis.  Of note that she had an upper respiratory infection in November 2022.  She is now 16 weeks pregnant.  She has no symptoms or signs suggestive of Graves' ophthalmopathy or thyromegaly.    Differential diagnosis:  Subacute thyroiditis versus Graves' disease.  Toxic adenoma or toxic multinodular goiter less likely.     Plan:  Recheck TFTs, including total T3  Check TSI, thyroid peroxidase and antithyroglobulin antibodies  Follow-up to be determined based on the above lab results.  The patient expresses her wish of not having to take medications during pregnancy unless really necessary.  Discussed about the possibility of her having Graves' disease, which might require treatment with methimazole.  Counseled on side effects of this medication, with GI symptoms being more common but also discussed about very rare side effects like agranulocytosis, anemia, thrombocytopenia or fulminant hepatitis.      Orders Placed This Encounter   Procedures     TSH     T4 free     T3 total     Thyroid stimulating immunoglobulin     Thyroid peroxidase antibody     Anti thyroglobulin antibody       The patient is seen in consultation at the request of Dr. Alyse Neves.     History of Present Illness:  Ema Rojas is a  32 year old female previously evaluated by Dr. Rosado, in March 2021, when she presented for evaluation of abnormal TFTs, checked as part of her work-up for irregular menses.  The patient was first told she has abnormal thyroid hormone levels in July 2020, when she delivered a stillborn baby at 36 weeks.  There is a questionable history of gestational diabetes with this pregnancy.  In November 2020, she was admitted for postpartum psychosis.  In March 2021, TSH was undetectable and free T4 was normal at 1.25. The thyroid ultrasound done at that time revealed a normal thyroid gland size, measuring 4.7 x 1.3 x 1.9 cm on the right and 4.4 x 1.7 x 1.5 cm on the left, with slightly heterogeneous echotexture and mildly increased vascularity.  I personally reviewed the ultrasound images.  She got pregnant in April 2021.    On subsequent lab work from September 2021, TSH improved at 0.21, while free T4 remained in the normal range at 0.98.  The patient delivered on November 20, 2021, a healthy baby boy.  Breast-fed for 3 months.  In January 2022, she was admitted for worsening psychosis and manic symptoms and she was discharged on Abilify and trazodone.  Patient compliance with treatment is questionable.  In September 2022, the patient complained of difficulty sleeping, which in the past has triggered the episodes of psychosis.  She was admitted on 9/29/2022, with an acute episode of psychosis and discharged on olanzapine.  In January 2022 and September 2022, TSH was normal at 1.39 and 0.42, respectively.  She found out she is pregnant in November 2022, when she established GYN care.    On lab work from 12/29/2022, TSH was undetectable and free T4 was elevated at 1.71.  LMP: 09/17/22          EDC: 6/24/23 12/29/22 A1c 5.1%    She denies taking any over-the-counter supplements with the exception of prenatal multivitamins and Tylenol, which she took for a recent tooth pain.  She is now 16 weeks pregnant.  She did lost  some weight in the first trimester and the same thing happened with her prior pregnancy.  She did experience nausea and vomiting up until last week, when these symptoms resolved.    Ebise denies any neck enlargement, dysphagia, voice hoarseness, cough, eye symptoms.  In 2022 she reports experiencing upper respiratory symptoms of runny nose, headaches, suggestive of a viral infection. No associated neck tenderness.     Her heart rate was 80 at the time of the visit (the patient checked it).    Past Medical History   Past Medical History:   Diagnosis Date     E. coli UTI 2020     Hyperthyroidism     she has seen endocrinology 2021     Iron deficiency anemia secondary to inadequate dietary iron intake 06/15/2020     Prior pregnancy with fetal demise 2020   Psychosis      Past Surgical History   Past Surgical History:   Procedure Laterality Date     NO HISTORY OF SURGERY         Current Medications    Current Outpatient Medications:      acetaminophen (TYLENOL) 325 MG tablet, Take 1-2 tablets (325-650 mg) by mouth every 6 hours as needed for mild pain, Disp: 90 tablet, Rfl: 0     acetaminophen (TYLENOL) 500 MG tablet, Take 1-2 tablets (500-1,000 mg) by mouth every 6 hours as needed for mild pain, Disp: 90 tablet, Rfl: 4     doxylamine (UNISOM) 25 MG TABS tablet, Take 0.5 tablets (12.5 mg) by mouth At Bedtime, Disp: 30 tablet, Rfl: 4     metoclopramide (REGLAN) 5 MG tablet, Take 1 tablet (5 mg) by mouth 4 times daily (before meals and nightly), Disp: 90 tablet, Rfl: 4     OLANZapine (ZYPREXA) 10 MG tablet, Take 1 tablet (10 mg) by mouth At Bedtime, Disp: 30 tablet, Rfl: 0     Prenatal Vit-DSS-Fe Cbn-FA (PRENATAL AD PO), , Disp: , Rfl:      vitamin B6 (PYRIDOXINE) 100 MG tablet, Take 1 tablet (100 mg) by mouth 3 times daily, Disp: 90 tablet, Rfl: 4    Family History:   Nonsignificant. Father  in an accident.   Originally from [place]. In [place]CE [place].     Social History  , one child. She  denies smoking, drinking alcohol or using illicit drugs. Occupation: unemployed.     Review of Systems   Systemic:             As above  Eye:                      No eye symptoms   Tonia-Laryngeal:     No tonia-laryngeal symptoms, no dysphagia, no hoarseness, no cough     Breast:                  No breast symptoms  Cardiovascular:    No cardiovascular symptoms, no CP or palpitations   Pulmonary:           No pulmonary symptoms, no SOB or cough    Gastrointestinal:   No gastrointestinal symptoms, no diarrhea or constipation   Genitourinary:       No genitourinary symptoms, no increased thirst or urination; at times she feels thirsty; she does not wake up at night to use the restroom  Endocrine:            No endocrine symptoms, no cold or heat intolerance; no increased sweating    Neurological:        No neurological symptoms, no headaches, no tremor, no numbness or tingling sensation, no dizziness   Musculoskeletal:  No musculoskeletal symptoms, no muscle or joint pain   Skin:                     No skin symptoms, no dry skin, no hair falling out   Psychological:     No psychological symptoms; no difficulty sleeping                  Vital Signs     Previous Weights:    Wt Readings from Last 10 Encounters:   12/29/22 52.6 kg (116 lb)   09/27/22 56.5 kg (124 lb 9.6 oz)   09/21/22 56.3 kg (124 lb 3.2 oz)   09/19/22 55.4 kg (122 lb 1.6 oz)   01/25/22 59.1 kg (130 lb 3.2 oz)   11/17/21 62.7 kg (138 lb 3.2 oz)   11/10/21 63.1 kg (139 lb 3.2 oz)   11/05/21 63.9 kg (140 lb 12.8 oz)   10/19/21 62.8 kg (138 lb 6.4 oz)   10/13/21 62.8 kg (138 lb 6.4 oz)        [facility] 09/17/2022     Physical Exam  General Appearance: alert, no distress noted   Eyes: grossly normal to inspection, conjunctivae and sclerae normal, no lid lag or stare   Neck: no visible thyromegaly   Respiratory: no audible wheeze, cough, or visible cyanosis.  No visible retractions or increased work of breathing.  Able to speak fully in complete  sentences.  Neurological: Cranial nerves grossly intact, mentation intact and speech normal; no tremor of the hands   Skin: no lesions on exposed skin   Psychological: mentation appears normal, affect normal, judgement and insight intact, normal speech and appearance well-groomed     Lab Results  I reviewed prior lab results documented in Epic.  TSH   Date Value Ref Range Status   12/29/2022 <0.01 (L) 0.30 - 4.20 uIU/mL Final   09/21/2022 0.42 0.40 - 4.00 mU/L Final   01/29/2022 1.39 0.40 - 4.00 mU/L Final   09/20/2021 0.21 (L) 0.40 - 4.00 mU/L Final   03/08/2021 <0.01 (L) 0.40 - 4.00 mU/L Final     55 minutes spent on the date of the encounter doing chart review, history and exam, documentation and further activities as noted above.

## 2023-01-31 ENCOUNTER — PRENATAL OFFICE VISIT (OUTPATIENT)
Dept: OBGYN | Facility: CLINIC | Age: 33
End: 2023-01-31
Payer: COMMERCIAL

## 2023-01-31 VITALS
WEIGHT: 116.6 LBS | HEART RATE: 79 BPM | SYSTOLIC BLOOD PRESSURE: 104 MMHG | BODY MASS INDEX: 25.23 KG/M2 | DIASTOLIC BLOOD PRESSURE: 62 MMHG | TEMPERATURE: 98.6 F

## 2023-01-31 DIAGNOSIS — E05.90 THYROTOXICOSIS DURING PREGNANCY: ICD-10-CM

## 2023-01-31 DIAGNOSIS — Z87.59 HISTORY OF IUFD: ICD-10-CM

## 2023-01-31 DIAGNOSIS — O99.280 THYROTOXICOSIS DURING PREGNANCY: ICD-10-CM

## 2023-01-31 DIAGNOSIS — O09.92 HIGH-RISK PREGNANCY IN SECOND TRIMESTER: Primary | ICD-10-CM

## 2023-01-31 LAB
T3 SERPL-MCNC: 198 NG/DL (ref 85–202)
T4 FREE SERPL-MCNC: 1.14 NG/DL (ref 0.9–1.7)
TSH SERPL DL<=0.005 MIU/L-ACNC: 0.27 UIU/ML (ref 0.3–4.2)

## 2023-01-31 PROCEDURE — 99207 PR PRENATAL VISIT: CPT | Performed by: OBSTETRICS & GYNECOLOGY

## 2023-01-31 PROCEDURE — 84443 ASSAY THYROID STIM HORMONE: CPT | Performed by: OBSTETRICS & GYNECOLOGY

## 2023-01-31 PROCEDURE — 99000 SPECIMEN HANDLING OFFICE-LAB: CPT | Performed by: OBSTETRICS & GYNECOLOGY

## 2023-01-31 PROCEDURE — 36415 COLL VENOUS BLD VENIPUNCTURE: CPT | Performed by: OBSTETRICS & GYNECOLOGY

## 2023-01-31 PROCEDURE — 84480 ASSAY TRIIODOTHYRONINE (T3): CPT | Performed by: OBSTETRICS & GYNECOLOGY

## 2023-01-31 PROCEDURE — 84445 ASSAY OF TSI GLOBULIN: CPT | Mod: 90 | Performed by: OBSTETRICS & GYNECOLOGY

## 2023-01-31 PROCEDURE — 84439 ASSAY OF FREE THYROXINE: CPT | Performed by: OBSTETRICS & GYNECOLOGY

## 2023-01-31 PROCEDURE — 86376 MICROSOMAL ANTIBODY EACH: CPT | Performed by: OBSTETRICS & GYNECOLOGY

## 2023-01-31 PROCEDURE — 86800 THYROGLOBULIN ANTIBODY: CPT | Performed by: OBSTETRICS & GYNECOLOGY

## 2023-01-31 NOTE — PROGRESS NOTES
19w3d   She is here with  and 14 month old son.  Feeling much better, Nausea has improved. She is eating better now and she has an appetite.   Her infected tooth is no longer hurting, encouraged to keep her upcoming dental appt.   Patient has [facility] US scheduled for 2/8/23.  Patient has a h/o transient hyperthyroidism and met with endocrine team.  Has labs that need to be drawn so will do today.   discussed GCT at 24 wks.  RR

## 2023-02-01 LAB
THYROGLOB AB SERPL IA-ACNC: <20 IU/ML
THYROPEROXIDASE AB SERPL-ACNC: <10 IU/ML

## 2023-02-03 ENCOUNTER — PRE VISIT (OUTPATIENT)
Dept: MATERNAL FETAL MEDICINE | Facility: CLINIC | Age: 33
End: 2023-02-03
Payer: COMMERCIAL

## 2023-02-03 LAB — TSI SER-ACNC: <1 TSI INDEX

## 2023-02-03 NOTE — RESULT ENCOUNTER NOTE
The thyroid hormone levels are now in the normal range, towards the upper end of normal range.  The antithyroid antibodies are negative.  No treatment is required, just monitoring.  I recommend to have them rechecked in 2 months.

## 2023-02-08 ENCOUNTER — HOSPITAL ENCOUNTER (OUTPATIENT)
Dept: ULTRASOUND IMAGING | Facility: CLINIC | Age: 33
Discharge: HOME | End: 2023-02-08
Attending: OBSTETRICS & GYNECOLOGY
Payer: COMMERCIAL

## 2023-02-08 ENCOUNTER — OFFICE VISIT (OUTPATIENT)
Dept: MATERNAL FETAL MEDICINE | Facility: CLINIC | Age: 33
End: 2023-02-08
Attending: OBSTETRICS & GYNECOLOGY
Payer: COMMERCIAL

## 2023-02-08 DIAGNOSIS — O09.293 PRIOR PREGNANCY WITH FETAL DEMISE AND CURRENT PREGNANCY IN THIRD TRIMESTER: Primary | ICD-10-CM

## 2023-02-08 DIAGNOSIS — O26.90 PREGNANCY RELATED CONDITION, ANTEPARTUM: ICD-10-CM

## 2023-02-08 PROCEDURE — 76811 OB US DETAILED SNGL FETUS: CPT | Mod: 26 | Performed by: OBSTETRICS & GYNECOLOGY

## 2023-02-08 PROCEDURE — 99213 OFFICE O/P EST LOW 20 MIN: CPT | Mod: 25 | Performed by: OBSTETRICS & GYNECOLOGY

## 2023-02-08 PROCEDURE — 76811 OB US DETAILED SNGL FETUS: CPT

## 2023-02-08 PROCEDURE — G0463 HOSPITAL OUTPT CLINIC VISIT: HCPCS | Mod: 25 | Performed by: OBSTETRICS & GYNECOLOGY

## 2023-02-08 NOTE — PROGRESS NOTES
1) Intrauterine pregnancy at 20 4/7 weeks gestational age.   2) None of the anomalies commonly detected by ultrasound were evident in the detailed fetal anatomic survey described above.  A small left sided CPC is seen. No other markers for aneuploidy were noted.   3) Growth parameters and estimated fetal weight were consistent with an appropriate for gestation age pattern of growth.  4) The amniotic fluid volume appeared normal.  5) Normal transabdominal cervical length.       We discussed the findings on today's ultrasound with the patient.    Patient declined genetic screening.  We reviewed the limitations of ultrasound in the diagnosis of aneuploidy and birth defects.     I discussed that on today s ultrasound a choroid plexus cyst (CPC) was noted. We discussed that CPCs are seen in 1-2% of all pregnancies in the second trimester and can be single or multiple, unilateral or bilateral and are often small (<1cm) in diameter. In the absence of other anatomic abnormalities or soft markers the risk of underlying aneuploidy is low (LR < 2 for Trisomy 18). We reviewed the limitations of ultrasound and its limitations in detecting aneuploidy. Since she has not yet had genetic screening this pregnancy, we reviewed the availability of cell free DNA screening for risk refinement . After counseling the patient declined. Finally, we discussed that CPCs are not considered a structural or functional brain abnormality and therefore have no impact on structural brain development or function. Specifically, there is no association with neurodevelopmental outcomes. Almost all resolve by 28 weeks and they do not require any follow-up prenatal or postnatally.     Given her history of prior near term IUFD.  Plan for a growth US at 28 weeks, then serially and weekly BPPs at 32 weeks.     Return to primary provider for continued prenatal care.    Thank you for the opportunity to participate in the care of this patient. If you have  questions regarding today's evaluation or if we can be of further service, please contact the [facility].    **Fetal anomalies may be present but not detected**        Nazanin Wiggins DO FACOG  Maternal Fetal Medicine Specialist    MDM:   1) 20 week pregnancy   2) choroid plexus cyst    Records reviewed/Labs discussed/follow up ordered  ED notes  Prior MFM consultation  Ordered a follow up growth US  Discussed option for NIPT, patient declines    Risk;   Minimal

## 2023-03-09 ENCOUNTER — PRENATAL OFFICE VISIT (OUTPATIENT)
Dept: OBGYN | Facility: CLINIC | Age: 33
End: 2023-03-09
Payer: COMMERCIAL

## 2023-03-09 VITALS
WEIGHT: 119 LBS | SYSTOLIC BLOOD PRESSURE: 112 MMHG | DIASTOLIC BLOOD PRESSURE: 72 MMHG | BODY MASS INDEX: 25.75 KG/M2 | TEMPERATURE: 98 F | HEART RATE: 95 BPM

## 2023-03-09 DIAGNOSIS — O09.92 HIGH-RISK PREGNANCY IN SECOND TRIMESTER: Primary | ICD-10-CM

## 2023-03-09 DIAGNOSIS — Z34.82 ENCOUNTER FOR SUPERVISION OF OTHER NORMAL PREGNANCY IN SECOND TRIMESTER: ICD-10-CM

## 2023-03-09 LAB
GLUCOSE 1H P 50 G GLC PO SERPL-MCNC: 102 MG/DL (ref 70–129)
HGB BLD-MCNC: 11.2 G/DL (ref 11.7–15.7)

## 2023-03-09 PROCEDURE — 36415 COLL VENOUS BLD VENIPUNCTURE: CPT | Performed by: OBSTETRICS & GYNECOLOGY

## 2023-03-09 PROCEDURE — 82950 GLUCOSE TEST: CPT | Performed by: OBSTETRICS & GYNECOLOGY

## 2023-03-09 PROCEDURE — 99207 PR PRENATAL VISIT: CPT | Performed by: OBSTETRICS & GYNECOLOGY

## 2023-03-09 NOTE — PROGRESS NOTES
24w5d   Here with  and son.  Feeling much better. Weight is still below pre- pregnant but appetite is back and she is starting to gain weight.  Feeling fetal movement.  L2 US at [facility] was normal except for an isolated CPC.  Given her history of IUFD near-term, plan for growth ultrasound monthly starting at 28 weeks, and weekly BPP's at 32 weeks.  GCT and hemoglobin today.  RR

## 2023-04-04 ENCOUNTER — PRENATAL OFFICE VISIT (OUTPATIENT)
Dept: OBGYN | Facility: CLINIC | Age: 33
End: 2023-04-04
Payer: COMMERCIAL

## 2023-04-04 VITALS
DIASTOLIC BLOOD PRESSURE: 62 MMHG | WEIGHT: 121 LBS | TEMPERATURE: 97 F | SYSTOLIC BLOOD PRESSURE: 110 MMHG | BODY MASS INDEX: 26.18 KG/M2 | HEART RATE: 86 BPM

## 2023-04-04 DIAGNOSIS — O99.280 THYROTOXICOSIS DURING PREGNANCY: ICD-10-CM

## 2023-04-04 DIAGNOSIS — O09.92 HIGH-RISK PREGNANCY IN SECOND TRIMESTER: Primary | ICD-10-CM

## 2023-04-04 DIAGNOSIS — E05.90 THYROTOXICOSIS DURING PREGNANCY: ICD-10-CM

## 2023-04-04 LAB
T4 FREE SERPL-MCNC: 1.21 NG/DL (ref 0.9–1.7)
TSH SERPL DL<=0.005 MIU/L-ACNC: 0.33 UIU/ML (ref 0.3–4.2)

## 2023-04-04 PROCEDURE — 84443 ASSAY THYROID STIM HORMONE: CPT | Performed by: OBSTETRICS & GYNECOLOGY

## 2023-04-04 PROCEDURE — 84439 ASSAY OF FREE THYROXINE: CPT | Performed by: OBSTETRICS & GYNECOLOGY

## 2023-04-04 PROCEDURE — 99207 PR PRENATAL VISIT: CPT | Performed by: OBSTETRICS & GYNECOLOGY

## 2023-04-04 PROCEDURE — 36415 COLL VENOUS BLD VENIPUNCTURE: CPT | Performed by: OBSTETRICS & GYNECOLOGY

## 2023-04-04 RX ORDER — FERROUS GLUCONATE 324(38)MG
324 TABLET ORAL
Qty: 60 TABLET | Refills: 3 | Status: SHIPPED | OUTPATIENT
Start: 2023-04-04

## 2023-04-04 NOTE — PROGRESS NOTES
28w3d   No complaints. Feeling well.  Baby is moving.  Feels like her mood is very stable.   She is pleasant and smiling. Toddler is playing in the room.   Passed the GCT. rec starting iron supplements.   discussed her h/o PP psychosis and I have referred her to Sarah Frank to meet her ahead of delivery and discuss management of potential postpartum depression.   She is agreeable.  RTC 2 wks. RR

## 2023-04-04 NOTE — Clinical Note
Can you please set up this patient to see Sarah CHAPARRO not a big hardwick but some time in the next month.  Thanks!!

## 2023-04-04 NOTE — Clinical Note
Yehuda Smith,  I am sending this patient to you for at least one visit prior to giving birth. She has a h/o PP psychosis after first 2 children. First pregnancy ended in IUFD.  She seems to be fine now but I want her in the system so afterwards if there is a problem, she can contact you.  She is 28w3d now.  Thanks,  RR

## 2023-04-05 ENCOUNTER — TELEPHONE (OUTPATIENT)
Dept: OBGYN | Facility: CLINIC | Age: 33
End: 2023-04-05
Payer: COMMERCIAL

## 2023-04-05 NOTE — RESULT ENCOUNTER NOTE
Good news!  The thyroid hormone levels are now in the normal range.  You can continue to follow-up with your gynecologist.  Please schedule a follow-up appointment with me 2 to 3 months following delivery, to make sure the thyroid hormone levels remain normal.

## 2023-04-10 ENCOUNTER — VIRTUAL VISIT (OUTPATIENT)
Dept: BEHAVIORAL HEALTH | Facility: CLINIC | Age: 33
End: 2023-04-10
Payer: COMMERCIAL

## 2023-04-10 DIAGNOSIS — F29 PSYCHOSIS, UNSPECIFIED PSYCHOSIS TYPE (H): Primary | ICD-10-CM

## 2023-04-10 PROCEDURE — 90834 PSYTX W PT 45 MINUTES: CPT | Mod: VID

## 2023-04-10 ASSESSMENT — ANXIETY QUESTIONNAIRES
GAD7 TOTAL SCORE: 0
IF YOU CHECKED OFF ANY PROBLEMS ON THIS QUESTIONNAIRE, HOW DIFFICULT HAVE THESE PROBLEMS MADE IT FOR YOU TO DO YOUR WORK, TAKE CARE OF THINGS AT HOME, OR GET ALONG WITH OTHER PEOPLE: NOT DIFFICULT AT ALL
7. FEELING AFRAID AS IF SOMETHING AWFUL MIGHT HAPPEN: NOT AT ALL
4. TROUBLE RELAXING: NOT AT ALL
GAD7 TOTAL SCORE: 0
3. WORRYING TOO MUCH ABOUT DIFFERENT THINGS: NOT AT ALL
6. BECOMING EASILY ANNOYED OR IRRITABLE: NOT AT ALL
8. IF YOU CHECKED OFF ANY PROBLEMS, HOW DIFFICULT HAVE THESE MADE IT FOR YOU TO DO YOUR WORK, TAKE CARE OF THINGS AT HOME, OR GET ALONG WITH OTHER PEOPLE?: NOT DIFFICULT AT ALL
5. BEING SO RESTLESS THAT IT IS HARD TO SIT STILL: NOT AT ALL
1. FEELING NERVOUS, ANXIOUS, OR ON EDGE: NOT AT ALL
7. FEELING AFRAID AS IF SOMETHING AWFUL MIGHT HAPPEN: NOT AT ALL
GAD7 TOTAL SCORE: 0
2. NOT BEING ABLE TO STOP OR CONTROL WORRYING: NOT AT ALL

## 2023-04-10 ASSESSMENT — PATIENT HEALTH QUESTIONNAIRE - PHQ9
10. IF YOU CHECKED OFF ANY PROBLEMS, HOW DIFFICULT HAVE THESE PROBLEMS MADE IT FOR YOU TO DO YOUR WORK, TAKE CARE OF THINGS AT HOME, OR GET ALONG WITH OTHER PEOPLE: NOT DIFFICULT AT ALL
SUM OF ALL RESPONSES TO PHQ QUESTIONS 1-9: 0
SUM OF ALL RESPONSES TO PHQ QUESTIONS 1-9: 0

## 2023-04-10 NOTE — PROGRESS NOTES
[facility]  April 10, 2023  Behavioral Health Clinician Progress Note    Patient Name: Ema Rojas       Service Type:  Individual      Service Location:   Dheere Bolohart / Email (patient reached)     Session Start Time: 11:00 AM  Session End Time: 11:50 AM      Session Length: 38 - 52      Attendees: Patient     Service Modality:  Video Visit:      Provider verified identity through the following two step process.  Patient provided:  Patient photo    Telemedicine Visit: The patient's condition can be safely assessed and treated via synchronous audio and visual telemedicine encounter.      Reason for Telemedicine Visit: Patient has requested telehealth visit and Patient convenience (e.g. access to timely appointments / distance to available provider)    Originating Site (Patient Location): Patient's home    Distant Site (Provider Location): [facility]    Consent:  The patient/guardian has verbally consented to: the potential risks and benefits of telemedicine (video visit) versus in person care; bill my insurance or make self-payment for services provided; and responsibility for payment of non-covered services.     Patient would like the video invitation sent by:  My Chart    Mode of Communication:  Video Conference via [facility]    Distant Location (Provider):  On-site    As the provider I attest to compliance with applicable laws and regulations related to telemedicine.    Visit Activities (Refresh list every visit): [facility] and [facility] Only    Diagnostic Assessment Date: Not yet completed  Treatment Plan Review Date: Not yet created  See Flowsheets for today's PHQ-9 and NICHOLAS-7 results  Previous PHQ-9:     1/25/2022     2:02 PM 4/10/2023    10:28 AM   PHQ-9 SCORE   PHQ-9 Total Score MyChart  0   PHQ-9 Total Score 0 0     Previous NICHOLAS-7:       4/10/2023    10:29 AM   NICHOLAS-7 SCORE   Total Score 0 (minimal anxiety)   Total Score 0       GRAY LEVEL:       View : No data to display.                 DATA  Extended Session (60+ minutes): No  Interactive Complexity: No  Crisis: No  [facility] Patient: No    Treatment Objective(s) Addressed in This Session:  Target Behavior(s): establishing plan/support for postpartum MH management    Postpartum psychosis: mitigate risks regarding postpartum psychosis by establishing appropriate MH support including  psychiatry, increasing understanding of symptoms, and creating plan to appropriately address postpartum concerns     Current Stressors / Issues:  Patient was referred by OB to discuss  mental health support options given patient's history of psychosis/ani, seemingly isolated within the postpartum period. Patient is currently 29w2d into her third pregnancy, having experienced a stillbirth at 38w in 2020 and giving birth to her son in 2021. Per patient report/chart review, she was hospitalized for psychotic symptoms for the first time in 2020, for the second time in 2022, and most recently in 2022 (see medical record for additional details). Patient denied other mental health history and denied any mental health concerns today. She reported she is sleeping well and has good appetite, is maintaining hygiene and household routines, and feels very excited about this pregnancy. C and patient discussed following up again prior to delivery in order to develop a written plan listing warning signs of postpartum psychosis or other postpartum mood/anxiety disorders, support resources/people, and steps to ensure safety. Also discussed patient's openness to  psychiatry referral which she accepted.     Interventions: C assessed patient's understanding of her previous MH symptoms and provided additional psycho-education on postpartum psychosis, depression, and anxiety. Affirmed patient's openness to discuss these concerns, reinforced patient's proactive approach to mental health care, and shared information  about support options. Provided information about  psychiatry and placed referral.     Progress on Treatment Objective(s) / Homework:  Treatment plan/objectives not defined - current objective is to schedule intake with  psych, review information about postpartum psychosis symptoms    Care Plan review completed: No    Medication Review:  Patient reported she is not taking any psychiatric medications, open to discussing options with  psych    Medication Compliance:  NA    Changes in Health Issues:  Currently pregnant - 29w2d    Chemical Use Review:   Substance Use: Chemical use reviewed, no active concerns identified      Tobacco Use: No current tobacco use.      Assessment: Current Emotional / Mental Status (status of significant symptoms):  Risk status (Self / Other harm or suicidal ideation)  Patient denies a history of suicidal ideation, suicide attempts, self-injurious behavior, homicidal ideation, homicidal behavior and and other safety concerns  Patient denies current fears or concerns for personal safety.  Patient denies current or recent suicidal ideation or behaviors.  Patient denies current or recent homicidal ideation or behaviors.  Patient denies current or recent self injurious behavior or ideation.  Patient denies other safety concerns.  A safety and risk management plan has not been developed at this time, however patient was encouraged to call [facility] should there be a change in any of these risk factors.    Appearance:   Appropriate   Eye Contact:   Good   Psychomotor Behavior: Normal   Attitude:   Cooperative  Interested Pleasant  Orientation:   All  Speech   Rate / Production: Normal/ Responsive   Volume:  Normal   Mood:    Normal  Affect:    Appropriate   Thought Content:  Clear   Thought Form:  Coherent  Goal Directed  Logical   Insight:    Fair     Diagnoses:  1. Psychosis, unspecified psychosis type (H)    (History of) provisional     Collateral Reports  Completed:  Routed note to Care Team Member(s) - referring OB    Plan: (Homework, other):  Follow-up scheduled in 6 weeks to formalize plan for management of potential postpartum psychosis symptoms. [facility] sent patient resources including information about postpartum psychosis, support groups, and crisis resources. She will also reflect on previous experiences with  to identify past symptoms which will be noted in plan. Patient will continue checking in about any MH concerns at prenatal visits and will move up next [facility] visit if she notices any significant changes in sleep, mood, or other behaviors. Priority referral for  psychiatry in place. CD Recommendations: No indications of CD issues.     Sarah Frank, MAGGIE, [facility]

## 2023-04-10 NOTE — Clinical Note
Hi Dr. Hogan, I met with Ema today to discuss concerns of potential postpartum psychosis. We will plan to meet at least once more when she is around 35 weeks to formalize a safety plan, but for now I've shared resources with her including information about PP psychosis symptoms, crisis resources, and support groups. I've also placed a priority referral for  psychiatry - even if she does not want to restart medications, I think it would be good for her to establish with someone who can be looped into her care in the case that any concerns to emerge, and she was in agreement. I encouraged her to follow up with me sooner if she notices any changes in mood or sleep.

## 2023-04-11 ENCOUNTER — OFFICE VISIT (OUTPATIENT)
Dept: MATERNAL FETAL MEDICINE | Facility: HOSPITAL | Age: 33
End: 2023-04-11
Attending: OBSTETRICS & GYNECOLOGY
Payer: COMMERCIAL

## 2023-04-11 ENCOUNTER — ANCILLARY PROCEDURE (OUTPATIENT)
Dept: ULTRASOUND IMAGING | Facility: HOSPITAL | Age: 33
End: 2023-04-11
Attending: OBSTETRICS & GYNECOLOGY
Payer: COMMERCIAL

## 2023-04-11 DIAGNOSIS — O40.9XX0 POLYHYDRAMNIOS AFFECTING PREGNANCY: ICD-10-CM

## 2023-04-11 DIAGNOSIS — O09.293 PRIOR PREGNANCY WITH FETAL DEMISE AND CURRENT PREGNANCY IN THIRD TRIMESTER: ICD-10-CM

## 2023-04-11 DIAGNOSIS — O09.293 PRIOR PREGNANCY WITH FETAL DEMISE AND CURRENT PREGNANCY IN THIRD TRIMESTER: Primary | ICD-10-CM

## 2023-04-11 PROCEDURE — 99207 PR NO CHARGE LOS: CPT | Performed by: OBSTETRICS & GYNECOLOGY

## 2023-04-11 PROCEDURE — 76816 OB US FOLLOW-UP PER FETUS: CPT

## 2023-04-11 PROCEDURE — 76816 OB US FOLLOW-UP PER FETUS: CPT | Mod: 26 | Performed by: OBSTETRICS & GYNECOLOGY

## 2023-04-11 NOTE — PROGRESS NOTES
"Please see \"Imaging\" tab under Chart Review for full details.    Kristina Avila MD  Maternal Fetal Medicine    "

## 2023-04-11 NOTE — NURSING NOTE
Ema Rojas is a  at 29w3d who presents to [facility] for follow up growth ultrasound. Pt reports positive fetal movement. Pt denies bldg/lof/change in discharge, contractions, headache, vision changes, chest pain/SOB or edema. SBAR given to Dr. Avila, see note in Epic.

## 2023-04-18 ENCOUNTER — PRENATAL OFFICE VISIT (OUTPATIENT)
Dept: OBGYN | Facility: CLINIC | Age: 33
End: 2023-04-18
Payer: COMMERCIAL

## 2023-04-18 VITALS
BODY MASS INDEX: 26.7 KG/M2 | HEART RATE: 80 BPM | DIASTOLIC BLOOD PRESSURE: 57 MMHG | SYSTOLIC BLOOD PRESSURE: 99 MMHG | TEMPERATURE: 97.4 F | WEIGHT: 123.4 LBS

## 2023-04-18 DIAGNOSIS — O09.92 HIGH-RISK PREGNANCY IN SECOND TRIMESTER: Primary | ICD-10-CM

## 2023-04-18 PROCEDURE — 99207 PR PRENATAL VISIT: CPT | Performed by: OBSTETRICS & GYNECOLOGY

## 2023-04-18 NOTE — PROGRESS NOTES
30w3d   No complaints.  Baby is moving a lot. Prenatal vitamin is making her a little nauseated.    Has been eating smaller but more frequent meals. Recommend increasing her dietary intake as her weight is still just around pre-pregnant weight.   Patient reports good mood.  She met with Sarah CHAPARRO and really appreciated the visit.  She has another appt at ~ 36 wks with her. Also was given a referral to  psych as well.   reviewed MFM US from last week.  Moderate poly noted with growth at 74%.  Repeat GCT was recommended today. She has a n/o GDM. Patient drank the glucola but vomited it up.  Will have her check QID BG levels x 2 wks and bring in to next visit. RR

## 2023-05-02 ENCOUNTER — PRENATAL OFFICE VISIT (OUTPATIENT)
Dept: OBGYN | Facility: CLINIC | Age: 33
End: 2023-05-02
Payer: COMMERCIAL

## 2023-05-02 VITALS
TEMPERATURE: 98.2 F | WEIGHT: 124.8 LBS | HEART RATE: 85 BPM | BODY MASS INDEX: 27.01 KG/M2 | DIASTOLIC BLOOD PRESSURE: 64 MMHG | SYSTOLIC BLOOD PRESSURE: 108 MMHG

## 2023-05-02 DIAGNOSIS — O09.92 HIGH-RISK PREGNANCY IN SECOND TRIMESTER: Primary | ICD-10-CM

## 2023-05-02 PROCEDURE — 99207 PR PRENATAL VISIT: CPT | Performed by: OBSTETRICS & GYNECOLOGY

## 2023-05-02 NOTE — PROGRESS NOTES
32w3d   No complaints.  Baby is moving well.   Checked BG levels intermittently and we reviewed today.  Fasting range from 86-96 and 1 Hr PP sugars range from .  She is monitoring her sugar and carb intake.   Reports her mood is stable. Has a follow-up appt with KM on 5/23.  She has only her  to help after the baby is born.  Has siblings in [place] but not in a close relationship with them.  She is working on a obtaining a visa for her mother to come and live here permanently.   Process is very slow. Not sure when she will come.   Follow-up US tomorrow for moderate poly at last US.   Patient declined tdap vaccine.  RR

## 2023-05-03 ENCOUNTER — ANCILLARY PROCEDURE (OUTPATIENT)
Dept: ULTRASOUND IMAGING | Facility: HOSPITAL | Age: 33
End: 2023-05-03
Attending: OBSTETRICS & GYNECOLOGY
Payer: COMMERCIAL

## 2023-05-03 ENCOUNTER — OFFICE VISIT (OUTPATIENT)
Dept: MATERNAL FETAL MEDICINE | Facility: HOSPITAL | Age: 33
End: 2023-05-03
Attending: OBSTETRICS & GYNECOLOGY
Payer: COMMERCIAL

## 2023-05-03 DIAGNOSIS — O40.3XX0 POLYHYDRAMNIOS IN THIRD TRIMESTER COMPLICATION, SINGLE OR UNSPECIFIED FETUS: Primary | ICD-10-CM

## 2023-05-03 DIAGNOSIS — O09.293 PRIOR PREGNANCY WITH FETAL DEMISE AND CURRENT PREGNANCY IN THIRD TRIMESTER: ICD-10-CM

## 2023-05-03 PROCEDURE — 76819 FETAL BIOPHYS PROFIL W/O NST: CPT | Mod: 26 | Performed by: OBSTETRICS & GYNECOLOGY

## 2023-05-03 PROCEDURE — 99207 PR NO CHARGE LOS: CPT | Performed by: OBSTETRICS & GYNECOLOGY

## 2023-05-03 PROCEDURE — 76819 FETAL BIOPHYS PROFIL W/O NST: CPT

## 2023-05-03 NOTE — PROGRESS NOTES
Please see full imaging report from [employer] program under imaging tab.    Moderate polyhydramnios - weekly BPP and growth in one week.   Breech.    Leigha Bronson MD  Maternal Fetal Medicine

## 2023-05-08 ENCOUNTER — HEALTH MAINTENANCE LETTER (OUTPATIENT)
Age: 33
End: 2023-05-08

## 2023-05-09 ENCOUNTER — OFFICE VISIT (OUTPATIENT)
Dept: MATERNAL FETAL MEDICINE | Facility: HOSPITAL | Age: 33
End: 2023-05-09
Attending: OBSTETRICS & GYNECOLOGY
Payer: COMMERCIAL

## 2023-05-09 ENCOUNTER — ANCILLARY PROCEDURE (OUTPATIENT)
Dept: ULTRASOUND IMAGING | Facility: HOSPITAL | Age: 33
End: 2023-05-09
Attending: OBSTETRICS & GYNECOLOGY
Payer: COMMERCIAL

## 2023-05-09 DIAGNOSIS — O09.293 PRIOR PREGNANCY WITH FETAL DEMISE AND CURRENT PREGNANCY IN THIRD TRIMESTER: ICD-10-CM

## 2023-05-09 DIAGNOSIS — O09.293 HISTORY OF STILLBIRTH IN CURRENTLY PREGNANT PATIENT, THIRD TRIMESTER: ICD-10-CM

## 2023-05-09 DIAGNOSIS — O24.410 DIET CONTROLLED GESTATIONAL DIABETES MELLITUS (GDM) IN THIRD TRIMESTER: Primary | ICD-10-CM

## 2023-05-09 PROCEDURE — 76819 FETAL BIOPHYS PROFIL W/O NST: CPT

## 2023-05-09 PROCEDURE — 76816 OB US FOLLOW-UP PER FETUS: CPT | Mod: 26 | Performed by: OBSTETRICS & GYNECOLOGY

## 2023-05-09 PROCEDURE — 76819 FETAL BIOPHYS PROFIL W/O NST: CPT | Mod: 26 | Performed by: OBSTETRICS & GYNECOLOGY

## 2023-05-09 PROCEDURE — 99207 PR NO CHARGE LOS: CPT | Performed by: OBSTETRICS & GYNECOLOGY

## 2023-05-09 NOTE — NURSING NOTE
Ema Rojas is a  at 33w3d who presents to [facility] for scheduled growth ultrasound and biophysical profile. Pt reports positive fetal movement. Pt denies bldg/lof/change in discharge, contractions, headache, vision changes, chest pain/SOB or edema. SBAR given to Dr. Quinn, see note in Epic.

## 2023-05-16 ENCOUNTER — PRENATAL OFFICE VISIT (OUTPATIENT)
Dept: OBGYN | Facility: CLINIC | Age: 33
End: 2023-05-16
Payer: COMMERCIAL

## 2023-05-16 VITALS
DIASTOLIC BLOOD PRESSURE: 62 MMHG | WEIGHT: 127 LBS | BODY MASS INDEX: 27.48 KG/M2 | SYSTOLIC BLOOD PRESSURE: 108 MMHG | HEART RATE: 96 BPM | TEMPERATURE: 98.3 F

## 2023-05-16 DIAGNOSIS — O09.92 HIGH-RISK PREGNANCY IN SECOND TRIMESTER: Primary | ICD-10-CM

## 2023-05-16 PROCEDURE — 99207 PR PRENATAL VISIT: CPT | Performed by: OBSTETRICS & GYNECOLOGY

## 2023-05-16 NOTE — PROGRESS NOTES
34w3d   Feeling well.  Baby is moving a lot. Mood is stable.  Has an appt with Sarah DAVIS Coming up soon.   Reviewed last US and growth is 85% with polyhydramnios. Patient passed GCT, was unable to tolerate a 3rd tri GCT due to vomiting. Instead she has been checking BG levels ~ 1-2 times a day.  90% sugars are WNL.   discussed IOL ~ 39 wks and patient will consider.  Plan GBS and cervix check at [place].  Declined tdap and COVID booster.  RR

## 2023-05-16 NOTE — Clinical Note
Can you please make an appt for this patient with  me at [facility] on 6/13 at 9:40 for an ob visit?  Thanks.

## 2023-05-23 ENCOUNTER — VIRTUAL VISIT (OUTPATIENT)
Dept: BEHAVIORAL HEALTH | Facility: CLINIC | Age: 33
End: 2023-05-23
Payer: COMMERCIAL

## 2023-05-23 DIAGNOSIS — F29 PSYCHOSIS, UNSPECIFIED PSYCHOSIS TYPE (H): Primary | ICD-10-CM

## 2023-05-23 PROCEDURE — 90832 PSYTX W PT 30 MINUTES: CPT | Mod: VID

## 2023-05-23 ASSESSMENT — ANXIETY QUESTIONNAIRES
1. FEELING NERVOUS, ANXIOUS, OR ON EDGE: NOT AT ALL
3. WORRYING TOO MUCH ABOUT DIFFERENT THINGS: NOT AT ALL
GAD7 TOTAL SCORE: 0
2. NOT BEING ABLE TO STOP OR CONTROL WORRYING: NOT AT ALL
GAD7 TOTAL SCORE: 0
7. FEELING AFRAID AS IF SOMETHING AWFUL MIGHT HAPPEN: NOT AT ALL
7. FEELING AFRAID AS IF SOMETHING AWFUL MIGHT HAPPEN: NOT AT ALL
5. BEING SO RESTLESS THAT IT IS HARD TO SIT STILL: NOT AT ALL
GAD7 TOTAL SCORE: 0
8. IF YOU CHECKED OFF ANY PROBLEMS, HOW DIFFICULT HAVE THESE MADE IT FOR YOU TO DO YOUR WORK, TAKE CARE OF THINGS AT HOME, OR GET ALONG WITH OTHER PEOPLE?: NOT DIFFICULT AT ALL
4. TROUBLE RELAXING: NOT AT ALL
IF YOU CHECKED OFF ANY PROBLEMS ON THIS QUESTIONNAIRE, HOW DIFFICULT HAVE THESE PROBLEMS MADE IT FOR YOU TO DO YOUR WORK, TAKE CARE OF THINGS AT HOME, OR GET ALONG WITH OTHER PEOPLE: NOT DIFFICULT AT ALL
6. BECOMING EASILY ANNOYED OR IRRITABLE: NOT AT ALL

## 2023-05-23 ASSESSMENT — PATIENT HEALTH QUESTIONNAIRE - PHQ9
SUM OF ALL RESPONSES TO PHQ QUESTIONS 1-9: 0
SUM OF ALL RESPONSES TO PHQ QUESTIONS 1-9: 0
10. IF YOU CHECKED OFF ANY PROBLEMS, HOW DIFFICULT HAVE THESE PROBLEMS MADE IT FOR YOU TO DO YOUR WORK, TAKE CARE OF THINGS AT HOME, OR GET ALONG WITH OTHER PEOPLE: NOT DIFFICULT AT ALL

## 2023-05-23 NOTE — PROGRESS NOTES
[facility]  May 23, 2023  Behavioral Health Clinician Progress Note    Patient Name: Ema Rojas       Service Type:  Individual      Service Location:   [facility] / Email (patient reached)     Session Start Time: 11:05 AM  Session End Time: 11:35 AM      Session Length: 16 - 37      Attendees: Patient     Service Modality:  Video Visit:      Provider verified identity through the following two step process.  Patient provided:  Patient is known previously to provider    Telemedicine Visit: The patient's condition can be safely assessed and treated via synchronous audio and visual telemedicine encounter.      Reason for Telemedicine Visit: Patient has requested telehealth visit and Patient convenience (e.g. access to timely appointments / distance to available provider)    Originating Site (Patient Location): Patient's home    Distant Site (Provider Location): [facility]    Consent:  The patient/guardian has verbally consented to: the potential risks and benefits of telemedicine (video visit) versus in person care; bill my insurance or make self-payment for services provided; and responsibility for payment of non-covered services.     Patient would like the video invitation sent by:  My Chart    Mode of Communication:  Video Conference via [facility]    Distant Location (Provider):  On-site    As the provider I attest to compliance with applicable laws and regulations related to telemedicine.    Visit Activities (Refresh list every visit): [facility] Only    Diagnostic Assessment Date: Not yet completed  Treatment Plan Review Date: Not yet created  See Flowsheets for today's PHQ-9 and NICHOLAS-7 results  Previous PHQ-9:       1/25/2022     2:02 PM 4/10/2023    10:28 AM 5/23/2023    10:34 AM   PHQ-9 SCORE   PHQ-9 Total Score MyChart  0 0   PHQ-9 Total Score 0 0 0     Previous NICHOLAS-7:       4/10/2023    10:29 AM 5/23/2023    10:42 AM   NICHOLAS-7 SCORE   Total Score 0 (minimal anxiety) 0  (minimal anxiety)   Total Score 0 0       GRAY LEVEL:       View : No data to display.                DATA  Extended Session (60+ minutes): No  Interactive Complexity: No  Crisis: No  [facility] Patient: No    Treatment Objective(s) Addressed in This Session:  Target Behavior(s): establishing plan/support for postpartum MH management    Postpartum psychosis: mitigate risks regarding postpartum psychosis by establishing appropriate MH support including  psychiatry, increasing understanding of symptoms, and creating plan to appropriately address postpartum concerns     Current Stressors / Issues:  Patient reported no current concerns with mood or anxiety. Denied significant sleep concerns, reported she is doing her best to maintain a balanced diet to manage gestational diabetes. Endorsed some stress about adjusting to two children but nothing concerning. [facility] assisted patient in identifying action steps prior to delivery, including learning more about signs of postpartum depression, discussing support plan with her , and establishing care with  psychiatry. Reviewed patient's support system and explored patient's openness to seek additional help postpartum to allow for more rest, given risk factor of sleep deprivation.     Interventions: [facility] assessed mental health symptoms, guided patient reflection on previous experience of postpartum mental health symptoms, offered suggestions for support resources, provided information about treatment options, affirmed patient's proactive approach to supporting postpartum MH    Progress on Treatment Objective(s) / Homework:  Treatment plan/objectives not defined - current objectives include scheduling intake with  psych, review information about postpartum psychosis symptoms, discuss care plan with     Care Plan review completed: No    Medication Review:  Patient reported she is not taking any psychiatric medications, open to discussing options with   psych    Medication Compliance:  NA    Changes in Health Issues:  Currently pregnant - 35w3d  Moderate polyhydramnios    Chemical Use Review:   Substance Use: Chemical use reviewed, no active concerns identified      Tobacco Use: No current tobacco use.      Assessment: Current Emotional / Mental Status (status of significant symptoms):  Risk status (Self / Other harm or suicidal ideation)  Patient denies a history of suicidal ideation, suicide attempts, self-injurious behavior, homicidal ideation, homicidal behavior and and other safety concerns  Patient denies current fears or concerns for personal safety.  Patient denies current or recent suicidal ideation or behaviors.  Patient denies current or recent homicidal ideation or behaviors.  Patient denies current or recent self injurious behavior or ideation.  Patient denies other safety concerns.  A safety and risk management plan has not been developed at this time, however patient was encouraged to call [place] [phone] should there be a change in any of these risk factors.    Appearance:   Appropriate   Eye Contact:   Good   Psychomotor Behavior: Normal   Attitude:   Cooperative  Interested Pleasant  Orientation:   All  Speech   Rate / Production: Normal/ Responsive   Volume:  Normal   Mood:    Reports stable mood  Affect:    Appropriate   Thought Content:  Clear   Thought Form:  Coherent  Goal Directed  Logical   Insight:    Good     Diagnoses:  1. Psychosis, unspecified psychosis type (H)     (History of) provisional     Collateral Reports Completed:  Not Applicable     Plan: (Homework, other):  Follow-up scheduled in 3 weeks. Patient will schedule intake with  psychiatry before next visit, review information about postpartum psychosis. [facility] and patient will review care plan/crisis support resources at next visit. CD Recommendations: No indications of CD issues.     Sarah Frank, MAGGIE, [facility]

## 2023-05-30 ENCOUNTER — OFFICE VISIT (OUTPATIENT)
Dept: MATERNAL FETAL MEDICINE | Facility: HOSPITAL | Age: 33
End: 2023-05-30
Attending: OBSTETRICS & GYNECOLOGY
Payer: COMMERCIAL

## 2023-05-30 ENCOUNTER — ANCILLARY PROCEDURE (OUTPATIENT)
Dept: ULTRASOUND IMAGING | Facility: HOSPITAL | Age: 33
End: 2023-05-30
Attending: OBSTETRICS & GYNECOLOGY
Payer: COMMERCIAL

## 2023-05-30 ENCOUNTER — PRENATAL OFFICE VISIT (OUTPATIENT)
Dept: OBGYN | Facility: CLINIC | Age: 33
End: 2023-05-30
Payer: COMMERCIAL

## 2023-05-30 VITALS
DIASTOLIC BLOOD PRESSURE: 61 MMHG | BODY MASS INDEX: 28.43 KG/M2 | WEIGHT: 131.4 LBS | HEART RATE: 73 BPM | TEMPERATURE: 98.3 F | SYSTOLIC BLOOD PRESSURE: 109 MMHG

## 2023-05-30 DIAGNOSIS — O09.293 PRIOR PREGNANCY WITH FETAL DEMISE AND CURRENT PREGNANCY IN THIRD TRIMESTER: ICD-10-CM

## 2023-05-30 DIAGNOSIS — O40.3XX0 POLYHYDRAMNIOS AFFECTING PREGNANCY IN THIRD TRIMESTER: ICD-10-CM

## 2023-05-30 DIAGNOSIS — O09.92 HIGH-RISK PREGNANCY IN SECOND TRIMESTER: Primary | ICD-10-CM

## 2023-05-30 DIAGNOSIS — E05.90 HYPERTHYROIDISM: ICD-10-CM

## 2023-05-30 DIAGNOSIS — O40.9XX0 POLYHYDRAMNIOS AFFECTING PREGNANCY: Primary | ICD-10-CM

## 2023-05-30 LAB — HGB BLD-MCNC: 10.4 G/DL (ref 11.7–15.7)

## 2023-05-30 PROCEDURE — 76819 FETAL BIOPHYS PROFIL W/O NST: CPT

## 2023-05-30 PROCEDURE — 36415 COLL VENOUS BLD VENIPUNCTURE: CPT | Performed by: OBSTETRICS & GYNECOLOGY

## 2023-05-30 PROCEDURE — 87653 STREP B DNA AMP PROBE: CPT | Performed by: OBSTETRICS & GYNECOLOGY

## 2023-05-30 PROCEDURE — 99207 PR PRENATAL VISIT: CPT | Performed by: OBSTETRICS & GYNECOLOGY

## 2023-05-30 PROCEDURE — 99207 PR NO CHARGE LOS: CPT | Performed by: OBSTETRICS & GYNECOLOGY

## 2023-05-30 PROCEDURE — 82728 ASSAY OF FERRITIN: CPT | Performed by: OBSTETRICS & GYNECOLOGY

## 2023-05-30 PROCEDURE — 76819 FETAL BIOPHYS PROFIL W/O NST: CPT | Mod: 26 | Performed by: OBSTETRICS & GYNECOLOGY

## 2023-05-30 ASSESSMENT — PATIENT HEALTH QUESTIONNAIRE - PHQ9
SUM OF ALL RESPONSES TO PHQ QUESTIONS 1-9: 1
5. POOR APPETITE OR OVEREATING: NOT AT ALL

## 2023-05-30 ASSESSMENT — ANXIETY QUESTIONNAIRES
GAD7 TOTAL SCORE: 0
7. FEELING AFRAID AS IF SOMETHING AWFUL MIGHT HAPPEN: NOT AT ALL
6. BECOMING EASILY ANNOYED OR IRRITABLE: NOT AT ALL
GAD7 TOTAL SCORE: 0
1. FEELING NERVOUS, ANXIOUS, OR ON EDGE: NOT AT ALL
3. WORRYING TOO MUCH ABOUT DIFFERENT THINGS: NOT AT ALL
2. NOT BEING ABLE TO STOP OR CONTROL WORRYING: NOT AT ALL
IF YOU CHECKED OFF ANY PROBLEMS ON THIS QUESTIONNAIRE, HOW DIFFICULT HAVE THESE PROBLEMS MADE IT FOR YOU TO DO YOUR WORK, TAKE CARE OF THINGS AT HOME, OR GET ALONG WITH OTHER PEOPLE: NOT DIFFICULT AT ALL
5. BEING SO RESTLESS THAT IT IS HARD TO SIT STILL: NOT AT ALL

## 2023-05-30 NOTE — PROGRESS NOTES
36w3d   Here with  and son. Baby is moving a lot. No ctx's.   BPP today 8/8 with moderate poly.  Has weekly US for poly. Baby cephalic in US today.   discussed IOL ~ 39 wks if poly persists.  Patient has been checking BG levels intermittently with 90% WNL.  GBS and Hgb today. RR

## 2023-05-30 NOTE — PROGRESS NOTES
Please see full imaging report from [employer] program under imaging tab.    Moderate polyhydramnios.     Leigha Bronson MD  Maternal Fetal Medicine

## 2023-05-30 NOTE — NURSING NOTE
Ema Rojas is a  at 36w3d who presents to [facility] for BPP. Pt reports positive fetal movement. Pt denies bldg/lof/change in discharge, contractions, headache, vision changes, chest pain/SOB or edema. SBAR given to Dr. Bronson, see note in Epic.

## 2023-05-31 LAB
FERRITIN SERPL-MCNC: 8 NG/ML (ref 6–175)
GP B STREP DNA SPEC QL NAA+PROBE: NEGATIVE

## 2023-06-06 ENCOUNTER — PRENATAL OFFICE VISIT (OUTPATIENT)
Dept: OBGYN | Facility: CLINIC | Age: 33
End: 2023-06-06
Payer: COMMERCIAL

## 2023-06-06 VITALS
DIASTOLIC BLOOD PRESSURE: 62 MMHG | WEIGHT: 132 LBS | HEART RATE: 80 BPM | TEMPERATURE: 97 F | SYSTOLIC BLOOD PRESSURE: 107 MMHG | BODY MASS INDEX: 28.56 KG/M2

## 2023-06-06 DIAGNOSIS — O24.410 GDM, CLASS A1: ICD-10-CM

## 2023-06-06 DIAGNOSIS — Z87.59 HISTORY OF IUFD: ICD-10-CM

## 2023-06-06 DIAGNOSIS — O09.92 HIGH-RISK PREGNANCY IN SECOND TRIMESTER: Primary | ICD-10-CM

## 2023-06-06 DIAGNOSIS — O40.3XX0 POLYHYDRAMNIOS AFFECTING PREGNANCY IN THIRD TRIMESTER: ICD-10-CM

## 2023-06-06 PROCEDURE — 59025 FETAL NON-STRESS TEST: CPT | Performed by: OBSTETRICS & GYNECOLOGY

## 2023-06-06 PROCEDURE — 99207 PR PRENATAL VISIT: CPT | Performed by: OBSTETRICS & GYNECOLOGY

## 2023-06-06 NOTE — PROGRESS NOTES
37w3d   No complaints.  Reviewed labs.  MACHELLE noted, discussed iron supplements.   Pregnancy c/b:  - Persistent moderate poly  - previous term IUFD  - glucose intolerance, possible GDM   - h/o PP psychosis -- not currently on meds. But sees [facility]  All above risks discussed with patient and now that she is term, a shared decision was made to induce on 23.  She was offered anytime after 37 wks.  She will discuss with her  and let me know if wants to move up further.    testing done with NST today due to risks noted above and NST is reactive.   EFM:   130 baseline, moderate variablility, + accels, no decels, Category I  RTC one week. RR

## 2023-06-07 ENCOUNTER — OFFICE VISIT (OUTPATIENT)
Dept: MATERNAL FETAL MEDICINE | Facility: HOSPITAL | Age: 33
End: 2023-06-07
Attending: OBSTETRICS & GYNECOLOGY
Payer: COMMERCIAL

## 2023-06-07 ENCOUNTER — ANCILLARY PROCEDURE (OUTPATIENT)
Dept: ULTRASOUND IMAGING | Facility: HOSPITAL | Age: 33
End: 2023-06-07
Attending: OBSTETRICS & GYNECOLOGY
Payer: COMMERCIAL

## 2023-06-07 DIAGNOSIS — O09.293 HISTORY OF STILLBIRTH IN CURRENTLY PREGNANT PATIENT, THIRD TRIMESTER: ICD-10-CM

## 2023-06-07 DIAGNOSIS — O24.410 DIET CONTROLLED GESTATIONAL DIABETES MELLITUS (GDM) IN THIRD TRIMESTER: ICD-10-CM

## 2023-06-07 DIAGNOSIS — O09.293 PRIOR PREGNANCY WITH FETAL DEMISE AND CURRENT PREGNANCY IN THIRD TRIMESTER: ICD-10-CM

## 2023-06-07 DIAGNOSIS — O40.9XX0 POLYHYDRAMNIOS AFFECTING PREGNANCY: Primary | ICD-10-CM

## 2023-06-07 PROCEDURE — 76819 FETAL BIOPHYS PROFIL W/O NST: CPT

## 2023-06-07 PROCEDURE — 76816 OB US FOLLOW-UP PER FETUS: CPT | Mod: 26 | Performed by: OBSTETRICS & GYNECOLOGY

## 2023-06-07 PROCEDURE — 99213 OFFICE O/P EST LOW 20 MIN: CPT | Mod: 25 | Performed by: OBSTETRICS & GYNECOLOGY

## 2023-06-07 PROCEDURE — 76816 OB US FOLLOW-UP PER FETUS: CPT

## 2023-06-07 PROCEDURE — 76818 FETAL BIOPHYS PROFILE W/NST: CPT | Mod: 26 | Performed by: OBSTETRICS & GYNECOLOGY

## 2023-06-07 PROCEDURE — G0463 HOSPITAL OUTPT CLINIC VISIT: HCPCS | Mod: 25 | Performed by: OBSTETRICS & GYNECOLOGY

## 2023-06-07 NOTE — PROGRESS NOTES
Please see full imaging report from [employer] program under imaging tab.    Thank-you for referring your patient to assess fetal growth. I discussed the findings on today's ultrasound with the patient and her family. We explained why an NST was necessary today, even though she just had one yesterday.    We discussed the ongoing concern with a severe increase in amniotic fluid, and the recommendation to consider delivery at/after 37 weeks with severe polyhydramnios. She is aware of the option for delivery at this time but is not interested. She indicates a plan for delivery 23. If she opts not to have an early term delivery, we should continue with weekly BPPs with a strong recommendation for delivery at 39 weeks with pediatrics present at delivery. This is due to an increased risk of  complications with severe polyhydramnios. The patient and her family are aware of this recommendation; she is delivering at [facility].     Follow-up is scheduled weekly for BPP until delivery.     Return to primary provider for continued prenatal care.     I spent a total of 15 minutes on the date of this encounter including preparing to see the patient (reviewing medical records/tests), counseling and discussing the plan of care, documenting the visit in the electronic medical record, and communicating with other health care professionals and/or care coordination.    Leigha Bronson MD  Maternal Fetal Medicine

## 2023-06-07 NOTE — NURSING NOTE
Ema Rojas is a  at 37w4d who presents to [facility] for scheduled weekly ultrasound surveillance. Pt reports positive fetal movement. Pt denies bldg/lof/change in discharge, contractions, headache, vision changes, chest pain/SOB or edema. SBAR given to Dr. Bronson, see note in Epic.

## 2023-06-13 ENCOUNTER — TELEPHONE (OUTPATIENT)
Dept: BEHAVIORAL HEALTH | Facility: CLINIC | Age: 33
End: 2023-06-13

## 2023-06-13 ENCOUNTER — PRENATAL OFFICE VISIT (OUTPATIENT)
Dept: OBGYN | Facility: CLINIC | Age: 33
End: 2023-06-13
Payer: COMMERCIAL

## 2023-06-13 VITALS
HEART RATE: 81 BPM | TEMPERATURE: 98.3 F | SYSTOLIC BLOOD PRESSURE: 109 MMHG | WEIGHT: 132 LBS | DIASTOLIC BLOOD PRESSURE: 72 MMHG | BODY MASS INDEX: 28.56 KG/M2

## 2023-06-13 DIAGNOSIS — O24.410 GDM, CLASS A1: ICD-10-CM

## 2023-06-13 DIAGNOSIS — O09.92 HIGH-RISK PREGNANCY IN SECOND TRIMESTER: Primary | ICD-10-CM

## 2023-06-13 DIAGNOSIS — Z87.59 HISTORY OF IUFD: ICD-10-CM

## 2023-06-13 DIAGNOSIS — O40.3XX0 POLYHYDRAMNIOS AFFECTING PREGNANCY IN THIRD TRIMESTER: ICD-10-CM

## 2023-06-13 PROCEDURE — 99207 PR PRENATAL VISIT: CPT | Performed by: OBSTETRICS & GYNECOLOGY

## 2023-06-13 PROCEDURE — 59025 FETAL NON-STRESS TEST: CPT | Performed by: OBSTETRICS & GYNECOLOGY

## 2023-06-13 NOTE — LETTER
June 13, 2023      Ema Rojas  [address]   [place] [address]        To Whom It May Concern,   Ema is a patient under my care for her current pregnancy. I am writing to please have her Mother's visa expedited. Due to this patient significant postpartum history and lack of family in the [place], it is necessary for her mother to be present as soon as possible to help care for both Ema and her growing family. Ema has a planned induction of labor on 06/18/2023. With questions, please call the clinic at [phone].    Mother full name:  Date of Birth:  Visa:    Sincerely,    Patience Hogan MD

## 2023-06-13 NOTE — TELEPHONE ENCOUNTER
Called patient to check in about missed appointment scheduled for earlier today. No answer, left VM.

## 2023-06-13 NOTE — Clinical Note
Can you please write a letter for patient to help expedite her mother's visa.   I thought we wrote a letter already to have her mother brought here from [place], but I am looking in her letters and I do not see that we wrote one.  Can you please call her and get the information about her mom and write a letter to help her mother get a visa to come and be with her.  She has a history of postpartum psychosis with both of her first 2 babies.  She she has no family in the [place].   has to go back to work and she really needs her mother to be with her.  Please let me know if you have any questions.  She is going in for an induction this coming weekend.   Thanks

## 2023-06-13 NOTE — PROGRESS NOTES
38w3d   Here with  and son. Baby is active.   Reviewed most recent US. discussed recommendation for delivery now given risks of polyhydramnios, previous term fetal demise and GDM.   Patient is planning on 6/19 but would agree to move up to ripening on 6/18 evening.    NST done today due to above noted risks.  EFM:   130 baseline, moderate variablility, + accels, no decels, Category I.  Baby was noted to be moving a lot during the NST.   NST is reactive today.  Patient reassured.  Recommended coming to labor and delivery for LOF, contractions, bleeding or decreased FM.   discussed risks of IOL for cord prolapse given large fluid volume.   Patient and  stated that they have no family here.  They were working on a visa for her mother but that has not yet been improved.  They are in a difficult situation with relying on friends to watch their little toddler.  Requesting a letter to expedite the visa for her mother especially in light of the fact that twice she suffered from postpartum psychosis after the birth of her first 2 babies.  Will work on getting a letter for her but will not be in time for the delivery.  But will focus on getting her mother here for the postpartum period.   Patient missed her appt today with Sarah CHAPARRO due to mixing up appt times with her ob visit today. Plans to reschedule. RR

## 2023-06-14 ENCOUNTER — TELEPHONE (OUTPATIENT)
Dept: OBGYN | Facility: CLINIC | Age: 33
End: 2023-06-14
Payer: COMMERCIAL

## 2023-06-14 NOTE — LETTER
June 14, 2023      Ema Rojas  [address]   [place] [address]        To Whom It May Concern,   Ema Rojas is a patient under my care for her current pregnancy. I am writing to please expedite her mother's Visa at this time. We are planning for an induction of labor on 06/18/2023. Due to Ema's complex postpartum history, it is necessary to have her mother here for her delivery and postpartum recovery due to Ema's  being away for work. Her mother is needed to help provide support and care for Ema and her growing family. Please call the clinic at [phone] with any questions or concerns with this request.    Mother's Full Name: Jenny Bansal (Elsabet)  Mother's YOB: 1963        Sincerely,    Patience Hogan MD

## 2023-06-14 NOTE — TELEPHONE ENCOUNTER
Sent via [facility]  Sent to home address on file as well.    Arleth Victor RN

## 2023-06-14 NOTE — TELEPHONE ENCOUNTER
Initiated letter.  Please review and revise if needed.    MATT Norman Rahel, MD  [address] Triage [address]  Can you please write a letter for patient to help expedite her mother's visa.     I thought we wrote a letter already to have her mother brought here from [place], but I am looking in her letters and I do not see that we wrote one.  Can you please call her and get the information about her mom and write a letter to help her mother get a visa to come and be with her.  She has a history of postpartum psychosis with both of her first 2 babies.  She she has no family in the [place].   has to go back to work and she really needs her mother to be with her.  Please let me know if you have any questions.  She is going in for an induction this coming weekend.    Thanks

## 2023-06-15 ENCOUNTER — OFFICE VISIT (OUTPATIENT)
Dept: MATERNAL FETAL MEDICINE | Facility: HOSPITAL | Age: 33
End: 2023-06-15
Attending: OBSTETRICS & GYNECOLOGY
Payer: COMMERCIAL

## 2023-06-15 ENCOUNTER — ANCILLARY PROCEDURE (OUTPATIENT)
Dept: ULTRASOUND IMAGING | Facility: HOSPITAL | Age: 33
End: 2023-06-15
Attending: OBSTETRICS & GYNECOLOGY
Payer: COMMERCIAL

## 2023-06-15 DIAGNOSIS — Z87.59 HISTORY OF IUFD: ICD-10-CM

## 2023-06-15 DIAGNOSIS — O40.9XX0 POLYHYDRAMNIOS AFFECTING PREGNANCY: ICD-10-CM

## 2023-06-15 DIAGNOSIS — O24.410 DIET CONTROLLED WHITE CLASSIFICATION A1 GESTATIONAL DIABETES MELLITUS (GDM): ICD-10-CM

## 2023-06-15 DIAGNOSIS — O40.9XX0 POLYHYDRAMNIOS AFFECTING PREGNANCY: Primary | ICD-10-CM

## 2023-06-15 DIAGNOSIS — O09.293 HISTORY OF STILLBIRTH IN CURRENTLY PREGNANT PATIENT, THIRD TRIMESTER: ICD-10-CM

## 2023-06-15 DIAGNOSIS — O24.410 DIET CONTROLLED GESTATIONAL DIABETES MELLITUS (GDM) IN THIRD TRIMESTER: ICD-10-CM

## 2023-06-15 PROCEDURE — 76819 FETAL BIOPHYS PROFIL W/O NST: CPT

## 2023-06-15 PROCEDURE — 76819 FETAL BIOPHYS PROFIL W/O NST: CPT | Mod: 26 | Performed by: OBSTETRICS & GYNECOLOGY

## 2023-06-15 PROCEDURE — 99207 PR NO CHARGE LOS: CPT | Performed by: OBSTETRICS & GYNECOLOGY

## 2023-06-15 NOTE — NURSING NOTE
Patient reports active fetal movement, denies pain, regualr contractions, leaking of fluid, or bleeding.  Reports blood sugar values fasting and post prandial values are within range, although reports she has not checked them today. GDM diet controlled.  Patient denies headache, visual changes, nausea/vomiting, epigastric pain related to preeclampsia. IOL scheduled for Sunday 6/18/23  SBAR given to NANCI ANDERSEN, see their note in Epic.

## 2023-06-15 NOTE — PROGRESS NOTES
Please refer to ultrasound report under 'Imaging' Studies of 'Chart Review' tabs.    Parker Moya M.D.

## 2023-06-18 ENCOUNTER — HOSPITAL ENCOUNTER (INPATIENT)
Facility: CLINIC | Age: 33
LOS: 2 days | Discharge: HOME HEALTH | End: 2023-06-20
Attending: OBSTETRICS & GYNECOLOGY | Admitting: OBSTETRICS & GYNECOLOGY
Payer: COMMERCIAL

## 2023-06-18 DIAGNOSIS — Z86.59 HISTORY OF PSYCHOSIS: ICD-10-CM

## 2023-06-18 PROBLEM — Z34.90 ENCOUNTER FOR INDUCTION OF LABOR: Status: ACTIVE | Noted: 2023-06-18

## 2023-06-18 LAB
ABO/RH(D): NORMAL
ANTIBODY SCREEN: NEGATIVE
SPECIMEN EXPIRATION DATE: NORMAL

## 2023-06-18 PROCEDURE — 120N000002 HC R&B MED SURG/OB UMMC

## 2023-06-18 RX ORDER — KETOROLAC TROMETHAMINE 30 MG/ML
30 INJECTION, SOLUTION INTRAMUSCULAR; INTRAVENOUS
Status: DISCONTINUED | OUTPATIENT
Start: 2023-06-18 | End: 2023-06-20 | Stop reason: HOSPADM

## 2023-06-18 RX ORDER — ACETAMINOPHEN 325 MG/1
650 TABLET ORAL EVERY 4 HOURS PRN
Status: DISCONTINUED | OUTPATIENT
Start: 2023-06-18 | End: 2023-06-19 | Stop reason: HOSPADM

## 2023-06-18 RX ORDER — LIDOCAINE 40 MG/G
CREAM TOPICAL
Status: DISCONTINUED | OUTPATIENT
Start: 2023-06-18 | End: 2023-06-19 | Stop reason: HOSPADM

## 2023-06-18 RX ORDER — METOCLOPRAMIDE HYDROCHLORIDE 5 MG/ML
10 INJECTION INTRAMUSCULAR; INTRAVENOUS EVERY 6 HOURS PRN
Status: DISCONTINUED | OUTPATIENT
Start: 2023-06-18 | End: 2023-06-19 | Stop reason: HOSPADM

## 2023-06-18 RX ORDER — NALOXONE HYDROCHLORIDE 0.4 MG/ML
0.2 INJECTION, SOLUTION INTRAMUSCULAR; INTRAVENOUS; SUBCUTANEOUS
Status: DISCONTINUED | OUTPATIENT
Start: 2023-06-18 | End: 2023-06-19 | Stop reason: HOSPADM

## 2023-06-18 RX ORDER — ONDANSETRON 4 MG/1
4 TABLET, ORALLY DISINTEGRATING ORAL EVERY 6 HOURS PRN
Status: DISCONTINUED | OUTPATIENT
Start: 2023-06-18 | End: 2023-06-19 | Stop reason: HOSPADM

## 2023-06-18 RX ORDER — OXYTOCIN 10 [USP'U]/ML
10 INJECTION, SOLUTION INTRAMUSCULAR; INTRAVENOUS
Status: DISCONTINUED | OUTPATIENT
Start: 2023-06-18 | End: 2023-06-19 | Stop reason: HOSPADM

## 2023-06-18 RX ORDER — ONDANSETRON 2 MG/ML
4 INJECTION INTRAMUSCULAR; INTRAVENOUS EVERY 6 HOURS PRN
Status: DISCONTINUED | OUTPATIENT
Start: 2023-06-18 | End: 2023-06-19 | Stop reason: HOSPADM

## 2023-06-18 RX ORDER — PROCHLORPERAZINE 25 MG
25 SUPPOSITORY, RECTAL RECTAL EVERY 12 HOURS PRN
Status: DISCONTINUED | OUTPATIENT
Start: 2023-06-18 | End: 2023-06-19 | Stop reason: HOSPADM

## 2023-06-18 RX ORDER — OXYTOCIN/0.9 % SODIUM CHLORIDE 30/500 ML
100-340 PLASTIC BAG, INJECTION (ML) INTRAVENOUS CONTINUOUS PRN
Status: DISCONTINUED | OUTPATIENT
Start: 2023-06-18 | End: 2023-06-20 | Stop reason: HOSPADM

## 2023-06-18 RX ORDER — SODIUM CHLORIDE, SODIUM LACTATE, POTASSIUM CHLORIDE, CALCIUM CHLORIDE 600; 310; 30; 20 MG/100ML; MG/100ML; MG/100ML; MG/100ML
INJECTION, SOLUTION INTRAVENOUS CONTINUOUS
Status: DISCONTINUED | OUTPATIENT
Start: 2023-06-18 | End: 2023-06-19 | Stop reason: HOSPADM

## 2023-06-18 RX ORDER — OXYTOCIN 10 [USP'U]/ML
10 INJECTION, SOLUTION INTRAMUSCULAR; INTRAVENOUS
Status: DISCONTINUED | OUTPATIENT
Start: 2023-06-18 | End: 2023-06-20 | Stop reason: HOSPADM

## 2023-06-18 RX ORDER — IBUPROFEN 800 MG/1
800 TABLET, FILM COATED ORAL
Status: DISCONTINUED | OUTPATIENT
Start: 2023-06-18 | End: 2023-06-20

## 2023-06-18 RX ORDER — CITRIC ACID/SODIUM CITRATE 334-500MG
30 SOLUTION, ORAL ORAL ONCE
Status: DISCONTINUED | OUTPATIENT
Start: 2023-06-18 | End: 2023-06-19 | Stop reason: HOSPADM

## 2023-06-18 RX ORDER — TRANEXAMIC ACID 10 MG/ML
1 INJECTION, SOLUTION INTRAVENOUS EVERY 30 MIN PRN
Status: DISCONTINUED | OUTPATIENT
Start: 2023-06-18 | End: 2023-06-19 | Stop reason: HOSPADM

## 2023-06-18 RX ORDER — NALOXONE HYDROCHLORIDE 0.4 MG/ML
0.4 INJECTION, SOLUTION INTRAMUSCULAR; INTRAVENOUS; SUBCUTANEOUS
Status: DISCONTINUED | OUTPATIENT
Start: 2023-06-18 | End: 2023-06-19 | Stop reason: HOSPADM

## 2023-06-18 RX ORDER — CITRIC ACID/SODIUM CITRATE 334-500MG
30 SOLUTION, ORAL ORAL
Status: DISCONTINUED | OUTPATIENT
Start: 2023-06-18 | End: 2023-06-19 | Stop reason: HOSPADM

## 2023-06-18 RX ORDER — METOCLOPRAMIDE 10 MG/1
10 TABLET ORAL EVERY 6 HOURS PRN
Status: DISCONTINUED | OUTPATIENT
Start: 2023-06-18 | End: 2023-06-19 | Stop reason: HOSPADM

## 2023-06-18 RX ORDER — METHYLERGONOVINE MALEATE 0.2 MG/ML
200 INJECTION INTRAVENOUS
Status: DISCONTINUED | OUTPATIENT
Start: 2023-06-18 | End: 2023-06-19 | Stop reason: HOSPADM

## 2023-06-18 RX ORDER — OXYTOCIN/0.9 % SODIUM CHLORIDE 30/500 ML
340 PLASTIC BAG, INJECTION (ML) INTRAVENOUS CONTINUOUS PRN
Status: DISCONTINUED | OUTPATIENT
Start: 2023-06-18 | End: 2023-06-19 | Stop reason: HOSPADM

## 2023-06-18 RX ORDER — MISOPROSTOL 200 UG/1
400 TABLET ORAL
Status: DISCONTINUED | OUTPATIENT
Start: 2023-06-18 | End: 2023-06-19 | Stop reason: HOSPADM

## 2023-06-18 RX ORDER — CARBOPROST TROMETHAMINE 250 UG/ML
250 INJECTION, SOLUTION INTRAMUSCULAR
Status: DISCONTINUED | OUTPATIENT
Start: 2023-06-18 | End: 2023-06-19 | Stop reason: HOSPADM

## 2023-06-18 RX ORDER — FENTANYL CITRATE 50 UG/ML
100 INJECTION, SOLUTION INTRAMUSCULAR; INTRAVENOUS
Status: DISCONTINUED | OUTPATIENT
Start: 2023-06-18 | End: 2023-06-19 | Stop reason: HOSPADM

## 2023-06-18 RX ORDER — MISOPROSTOL 200 UG/1
800 TABLET ORAL
Status: DISCONTINUED | OUTPATIENT
Start: 2023-06-18 | End: 2023-06-19 | Stop reason: HOSPADM

## 2023-06-18 RX ORDER — PROCHLORPERAZINE MALEATE 10 MG
10 TABLET ORAL EVERY 6 HOURS PRN
Status: DISCONTINUED | OUTPATIENT
Start: 2023-06-18 | End: 2023-06-19 | Stop reason: HOSPADM

## 2023-06-18 ASSESSMENT — ACTIVITIES OF DAILY LIVING (ADL): ADLS_ACUITY_SCORE: 20

## 2023-06-19 LAB
ERYTHROCYTE [DISTWIDTH] IN BLOOD BY AUTOMATED COUNT: 15.3 % (ref 10–15)
HCT VFR BLD AUTO: 33.4 % (ref 35–47)
HGB BLD-MCNC: 10.1 G/DL (ref 11.7–15.7)
MCH RBC QN AUTO: 24.2 PG (ref 26.5–33)
MCHC RBC AUTO-ENTMCNC: 30.2 G/DL (ref 31.5–36.5)
MCV RBC AUTO: 80 FL (ref 78–100)
PLATELET # BLD AUTO: 196 10E3/UL (ref 150–450)
RBC # BLD AUTO: 4.18 10E6/UL (ref 3.8–5.2)
T PALLIDUM AB SER QL: NONREACTIVE
WBC # BLD AUTO: 7.1 10E3/UL (ref 4–11)

## 2023-06-19 PROCEDURE — 258N000003 HC RX IP 258 OP 636

## 2023-06-19 PROCEDURE — 250N000011 HC RX IP 250 OP 636

## 2023-06-19 PROCEDURE — 722N000001 HC LABOR CARE VAGINAL DELIVERY SINGLE

## 2023-06-19 PROCEDURE — 86850 RBC ANTIBODY SCREEN: CPT

## 2023-06-19 PROCEDURE — 250N000009 HC RX 250: Performed by: OBSTETRICS & GYNECOLOGY

## 2023-06-19 PROCEDURE — 85027 COMPLETE CBC AUTOMATED: CPT

## 2023-06-19 PROCEDURE — 250N000009 HC RX 250

## 2023-06-19 PROCEDURE — 250N000013 HC RX MED GY IP 250 OP 250 PS 637

## 2023-06-19 PROCEDURE — 36415 COLL VENOUS BLD VENIPUNCTURE: CPT

## 2023-06-19 PROCEDURE — 86901 BLOOD TYPING SEROLOGIC RH(D): CPT

## 2023-06-19 PROCEDURE — 59400 OBSTETRICAL CARE: CPT | Mod: GC | Performed by: OBSTETRICS & GYNECOLOGY

## 2023-06-19 PROCEDURE — 0KQM0ZZ REPAIR PERINEUM MUSCLE, OPEN APPROACH: ICD-10-PCS | Performed by: OBSTETRICS & GYNECOLOGY

## 2023-06-19 PROCEDURE — 86780 TREPONEMA PALLIDUM: CPT

## 2023-06-19 PROCEDURE — 120N000002 HC R&B MED SURG/OB UMMC

## 2023-06-19 RX ORDER — HYDROCORTISONE 25 MG/G
CREAM TOPICAL 3 TIMES DAILY PRN
Status: DISCONTINUED | OUTPATIENT
Start: 2023-06-19 | End: 2023-06-20 | Stop reason: HOSPADM

## 2023-06-19 RX ORDER — MISOPROSTOL 200 UG/1
400 TABLET ORAL
Status: DISCONTINUED | OUTPATIENT
Start: 2023-06-19 | End: 2023-06-20 | Stop reason: HOSPADM

## 2023-06-19 RX ORDER — LIDOCAINE 40 MG/G
CREAM TOPICAL
Status: DISCONTINUED | OUTPATIENT
Start: 2023-06-19 | End: 2023-06-19 | Stop reason: HOSPADM

## 2023-06-19 RX ORDER — METHYLERGONOVINE MALEATE 0.2 MG/ML
200 INJECTION INTRAVENOUS
Status: DISCONTINUED | OUTPATIENT
Start: 2023-06-19 | End: 2023-06-20 | Stop reason: HOSPADM

## 2023-06-19 RX ORDER — FENTANYL CITRATE-0.9 % NACL/PF 10 MCG/ML
PLASTIC BAG, INJECTION (ML) INTRAVENOUS
Status: DISCONTINUED
Start: 2023-06-19 | End: 2023-06-19 | Stop reason: HOSPADM

## 2023-06-19 RX ORDER — SODIUM CHLORIDE, SODIUM LACTATE, POTASSIUM CHLORIDE, CALCIUM CHLORIDE 600; 310; 30; 20 MG/100ML; MG/100ML; MG/100ML; MG/100ML
INJECTION, SOLUTION INTRAVENOUS CONTINUOUS PRN
Status: DISCONTINUED | OUTPATIENT
Start: 2023-06-19 | End: 2023-06-19 | Stop reason: HOSPADM

## 2023-06-19 RX ORDER — FENTANYL/ROPIVACAINE/NS/PF 2MCG/ML-.1
PLASTIC BAG, INJECTION (ML) EPIDURAL
Status: DISCONTINUED | OUTPATIENT
Start: 2023-06-19 | End: 2023-06-19 | Stop reason: HOSPADM

## 2023-06-19 RX ORDER — NALBUPHINE HYDROCHLORIDE 10 MG/ML
2.5-5 INJECTION, SOLUTION INTRAMUSCULAR; INTRAVENOUS; SUBCUTANEOUS EVERY 6 HOURS PRN
Status: DISCONTINUED | OUTPATIENT
Start: 2023-06-19 | End: 2023-06-19

## 2023-06-19 RX ORDER — LIDOCAINE HYDROCHLORIDE 10 MG/ML
INJECTION, SOLUTION EPIDURAL; INFILTRATION; INTRACAUDAL; PERINEURAL
Status: COMPLETED
Start: 2023-06-19 | End: 2023-06-19

## 2023-06-19 RX ORDER — OXYTOCIN 10 [USP'U]/ML
INJECTION, SOLUTION INTRAMUSCULAR; INTRAVENOUS
Status: DISCONTINUED
Start: 2023-06-19 | End: 2023-06-19 | Stop reason: WASHOUT

## 2023-06-19 RX ORDER — OXYTOCIN/0.9 % SODIUM CHLORIDE 30/500 ML
PLASTIC BAG, INJECTION (ML) INTRAVENOUS
Status: DISCONTINUED
Start: 2023-06-19 | End: 2023-06-19 | Stop reason: HOSPADM

## 2023-06-19 RX ORDER — ACETAMINOPHEN 325 MG/1
650 TABLET ORAL EVERY 4 HOURS PRN
Status: DISCONTINUED | OUTPATIENT
Start: 2023-06-19 | End: 2023-06-20 | Stop reason: HOSPADM

## 2023-06-19 RX ORDER — OXYTOCIN/0.9 % SODIUM CHLORIDE 30/500 ML
340 PLASTIC BAG, INJECTION (ML) INTRAVENOUS CONTINUOUS PRN
Status: DISCONTINUED | OUTPATIENT
Start: 2023-06-19 | End: 2023-06-20 | Stop reason: HOSPADM

## 2023-06-19 RX ORDER — MISOPROSTOL 200 UG/1
800 TABLET ORAL
Status: DISCONTINUED | OUTPATIENT
Start: 2023-06-19 | End: 2023-06-20 | Stop reason: HOSPADM

## 2023-06-19 RX ORDER — OXYTOCIN/0.9 % SODIUM CHLORIDE 30/500 ML
1-24 PLASTIC BAG, INJECTION (ML) INTRAVENOUS CONTINUOUS
Status: DISCONTINUED | OUTPATIENT
Start: 2023-06-19 | End: 2023-06-19 | Stop reason: HOSPADM

## 2023-06-19 RX ORDER — CARBOPROST TROMETHAMINE 250 UG/ML
250 INJECTION, SOLUTION INTRAMUSCULAR
Status: DISCONTINUED | OUTPATIENT
Start: 2023-06-19 | End: 2023-06-20 | Stop reason: HOSPADM

## 2023-06-19 RX ORDER — MODIFIED LANOLIN
OINTMENT (GRAM) TOPICAL
Status: DISCONTINUED | OUTPATIENT
Start: 2023-06-19 | End: 2023-06-20 | Stop reason: HOSPADM

## 2023-06-19 RX ORDER — TRANEXAMIC ACID 10 MG/ML
1 INJECTION, SOLUTION INTRAVENOUS EVERY 30 MIN PRN
Status: DISCONTINUED | OUTPATIENT
Start: 2023-06-19 | End: 2023-06-20 | Stop reason: HOSPADM

## 2023-06-19 RX ORDER — OXYTOCIN 10 [USP'U]/ML
10 INJECTION, SOLUTION INTRAMUSCULAR; INTRAVENOUS
Status: DISCONTINUED | OUTPATIENT
Start: 2023-06-19 | End: 2023-06-20 | Stop reason: HOSPADM

## 2023-06-19 RX ORDER — MISOPROSTOL 100 UG/1
25 TABLET ORAL EVERY 4 HOURS PRN
Status: DISCONTINUED | OUTPATIENT
Start: 2023-06-19 | End: 2023-06-19 | Stop reason: HOSPADM

## 2023-06-19 RX ORDER — FENTANYL CITRATE-0.9 % NACL/PF 10 MCG/ML
100 PLASTIC BAG, INJECTION (ML) INTRAVENOUS EVERY 5 MIN PRN
Status: DISCONTINUED | OUTPATIENT
Start: 2023-06-19 | End: 2023-06-19 | Stop reason: HOSPADM

## 2023-06-19 RX ORDER — MISOPROSTOL 200 UG/1
TABLET ORAL
Status: DISCONTINUED
Start: 2023-06-19 | End: 2023-06-19 | Stop reason: HOSPADM

## 2023-06-19 RX ORDER — DOCUSATE SODIUM 100 MG/1
100 CAPSULE, LIQUID FILLED ORAL DAILY
Status: DISCONTINUED | OUTPATIENT
Start: 2023-06-19 | End: 2023-06-20 | Stop reason: HOSPADM

## 2023-06-19 RX ORDER — BISACODYL 10 MG
10 SUPPOSITORY, RECTAL RECTAL DAILY PRN
Status: DISCONTINUED | OUTPATIENT
Start: 2023-06-19 | End: 2023-06-20 | Stop reason: HOSPADM

## 2023-06-19 RX ORDER — IBUPROFEN 800 MG/1
800 TABLET, FILM COATED ORAL EVERY 6 HOURS PRN
Status: DISCONTINUED | OUTPATIENT
Start: 2023-06-19 | End: 2023-06-20 | Stop reason: HOSPADM

## 2023-06-19 RX ADMIN — Medication 2 MILLI-UNITS/MIN: at 06:13

## 2023-06-19 RX ADMIN — FENTANYL CITRATE 50 MCG: 50 INJECTION INTRAMUSCULAR; INTRAVENOUS at 00:30

## 2023-06-19 RX ADMIN — METOCLOPRAMIDE HYDROCHLORIDE 10 MG: 5 INJECTION INTRAMUSCULAR; INTRAVENOUS at 06:05

## 2023-06-19 RX ADMIN — IBUPROFEN 800 MG: 800 TABLET, FILM COATED ORAL at 19:43

## 2023-06-19 RX ADMIN — SODIUM CHLORIDE, POTASSIUM CHLORIDE, SODIUM LACTATE AND CALCIUM CHLORIDE: 600; 310; 30; 20 INJECTION, SOLUTION INTRAVENOUS at 10:55

## 2023-06-19 RX ADMIN — MISOPROSTOL 25 MCG: 100 TABLET ORAL at 00:50

## 2023-06-19 RX ADMIN — KETOROLAC TROMETHAMINE 30 MG: 30 INJECTION, SOLUTION INTRAMUSCULAR; INTRAVENOUS at 12:41

## 2023-06-19 RX ADMIN — LIDOCAINE HYDROCHLORIDE 20 ML: 10 INJECTION, SOLUTION EPIDURAL; INFILTRATION; INTRACAUDAL; PERINEURAL at 11:49

## 2023-06-19 RX ADMIN — MISOPROSTOL 800 MCG: 200 TABLET ORAL at 11:57

## 2023-06-19 RX ADMIN — DOCUSATE SODIUM 100 MG: 100 CAPSULE, LIQUID FILLED ORAL at 14:51

## 2023-06-19 RX ADMIN — SODIUM CHLORIDE, POTASSIUM CHLORIDE, SODIUM LACTATE AND CALCIUM CHLORIDE: 600; 310; 30; 20 INJECTION, SOLUTION INTRAVENOUS at 06:12

## 2023-06-19 ASSESSMENT — ACTIVITIES OF DAILY LIVING (ADL)
ADLS_ACUITY_SCORE: 20

## 2023-06-19 NOTE — PROVIDER NOTIFICATION
06/19/23 0848   Provider Notification   Provider Name/Title Dr. Hogan   Method of Notification At Bedside   Notification Reason Uterine Activity     RN asked Dr. Hogan to reduce pitocin to 2 mL/hr, per Dr. Hogan can go down to 2 mL/hr on pitocin.

## 2023-06-19 NOTE — PROVIDER NOTIFICATION
06/19/23 1100   Provider Notification   Provider Name/Title Dr. Hogan   Method of Notification At Bedside   Request Evaluate in Person   Notification Reason Status Update;Membrane Status     RN and Dr. Hogan discuss pts report of leaking of fluid. Plan per Dr. Hogan is to check pt after epidural.

## 2023-06-19 NOTE — PROVIDER NOTIFICATION
06/19/23 0446   Provider Notification   Provider Name/Title Dr Rubalcava   Method of Notification In Department   Request Evaluate in Person   Notification Reason Status Update     Jimenez bulb came out, consider changes to plan due to bulb out.

## 2023-06-19 NOTE — L&D DELIVERY NOTE
Delivery Summary    Ema Rojas is a 33 year old now  at 39w2d, admitted for induction of labor. She experienced SROM and although she had planned for an epidural, she progressed rapidly to complete after SROM.   She received NO2 for pain control, progressed to complete dilation, pushed and with good maternal effort delivered an infant  from the YOAN position. Shoulders delivered easily. Infant with spontaneous cry. Placed on mother's chest. Cord clamping delayed 1min. Apgars 9/9, weight . IV pitocin started. Placenta delivered with fundal massage, gentle cord traction and suprapubic countertraction; noted to be intact with 3 vessel cord. 2nd degree perineal laceration, repaired. Fundus firm and lochia minimal at the end of the case. .    Dr. Hogan was present and gloved for entire delivery and repair.    Moreno Michael DO, MA       Physician Attestation   I was present for this uncomplicated vaginal delivery and the repair of the above noted lacerations.  I have reviewed and edited the above delivery report to reflect the exact findings and details of the birth.  Mom and baby stable following birth.      Patience Hogan MD   2023       OB Vaginal Delivery Note    Ema Rojas MRN# [id number]   Age: 33 year old YOB: 1990       GA: 39w2d  GP:   Labor Complications: None   EBL:   mL  Delivery QBL: 134 mL  Delivery Type: Vaginal, Spontaneous   ROM to Delivery Time: (Delivered) Minutes: 29  Amalia Weight:     1 Minute 5 Minute 10 Minute   Apgar Totals: 9   9        ELIAS RIVAS;ARIELLA MATT;MORENO MICHAEL     Delivery Details:  Ema Rojas, a 33 year old  female delivered a viable infant with apgars of 9  and 9 . Patient was fully dilated and pushing after   hours   minutes in active labor. Delivery was via vaginal, spontaneous  to a sterile field under nitrous oxide  anesthesia. Infant delivered in vertex  right  occiput  anterior  position.  Anterior and posterior shoulders delivered without difficulty. The cord was clamped, cut twice and 3 vessels  were noted. Cord blood was obtained in routine fashion with the following disposition: lab .      Cord complications: none   Placenta delivered at 2023 11:44 AM . Placental disposition was Hospital disposal . Fundal massage performed and fundus found to be firm.     Episiotomy: none    Perineum, vagina, cervix were inspected, and the following lacerations were noted:   Perineal lacerations: 2nd                Any lacerations were repaired in the usual fashion using .    Excellent hemostasis was noted. Needle count correct. Infant and patient in delivery room in good and stable condition.        Prabhu Rojas [[id number]]      Labor Event Times      Dilation complete date: 23 Complete time: 11:31 AM   Start pushing date/time: 2023 1131          Labor Length      2nd Stage (hrs): 0 (min): 8   3rd Stage (hrs): 0 (min): 5          Labor Events     labor?: No   steroids: None  Labor Type: Induction/Cervical ripening  Predominate monitoring during 1st stage: continuous electronic fetal monitoring     Antibiotics received during labor?: No     Rupture identifier: Sac 1  Rupture date/time: 23 1110   Rupture type: Spontaneous Rupture of Membranes  Fluid color: Clear  Fluid odor: Normal       Induction date/time:      Cervical ripening date/time: 23 0050           Delivery/Placenta Date and Time      Delivery Date: 23 Delivery Time: 11:39 AM   Placenta Date/Time: 2023 11:44 AM  Oxytocin given at the time of delivery: after delivery of baby  Delivering clinician: Patience Hogan MD   Other personnel present at delivery:  Provider Role   Apolonia Lua RN Delivery Nurse   John Perez RN Delivery Assist   Gideon Simmons MD Resident             Vaginal Counts       Initial count performed by 2 team members:  Two Team Members   Apolonia Lua RN, Dr.  Edison         Needles Suture Needles Sponges (RETIRED) Instruments   Initial counts 2 0 5    Added to count 0 1 0    Relief counts       Final counts 2 1 5            Placed during labor Accounted for at the end of labor   FSE No NA   IUPC No NA   Cervidil No NA                  Final count performed by 2 team members:  Two Team Members   Dr. Edison Lua RN      Final count correct?: Yes  Pre-Birth Team Brief: Complete  Post-Birth Team Debrief: Complete       Apgars    Living status: Living   1 Minute 5 Minute 10 Minute 15 Minute 20 Minute   Skin color: 1  1       Heart rate: 2  2       Reflex irritability: 2  2       Muscle tone: 2  2       Respiratory effort: 2  2       Total: 9  9       Apgars assigned by: ARIELLA MATT RNC       Cord      Vessels: 3 Vessels    Cord Complications: None     Cord Blood Disposition: Lab      Gases Sent?: No      Delayed cord clamping?: Yes      Cord Clamping Delay (seconds): 31-60 seconds      Stem cell collection?: No                          Resuscitation     Care at Delivery: Spontaneous cry, to mother for skin to skin       Skin to Skin and Feeding Plan      Skin to skin initiation date/time: 1841    Skin to skin with: Mother  Skin to skin end date/time:     Breastfeeding initiated date/time: 2023 1220       Labor Events and Shoulder Dystocia    Fetal Tracing Prior to Delivery: Category 1       Delivery (Maternal) (Provider to Complete) (639195)    Episiotomy: None  Perineal lacerations: 2nd Repaired?: Yes   Repair suture: 3-0 Vicryl  Genital tract inspection done: Pos       Blood Loss  Mother: Ema Rojas W #[id number]     Start of Mother's Information      Delivery Blood Loss  23 2339 - 23 1312      Delivery QBL (mL) Hospital Encounter 134 mL    Total  134 mL               End of Mother's Information  Mother: Ema Rojas W #[id number]                Delivery - Provider to Complete ([id number])    Delivering clinician:  Patience Hogan MD  Delivery Type (Choose the 1 that will go to the Birth History): Vaginal, Spontaneous    Other personnel:  Provider Role   Apolonia Lua, MATT Delivery Nurse   John Perez RN Delivery Assist   Gideon Simmons MD Resident                                   Placenta    Date/Time: 6/19/2023 11:44 AM  Removal: Spontaneous  Disposition: Hospital disposal             Anesthesia    Method: Nitrous Oxide                    Presentation and Position    Presentation: Vertex    Position: Right Occiput Anterior                     Gideon Simmons MD

## 2023-06-19 NOTE — PROVIDER NOTIFICATION
06/19/23 1352   Provider Notification   Provider Name/Title Dr. Hogan   Method of Notification Electronic Page   Notification Reason Maternal Vital Sign Change     RN reported 99/61 BP. RN reported pt denies feeling lightheaded.

## 2023-06-19 NOTE — PROVIDER NOTIFICATION
06/19/23 0640   Provider Notification   Provider Name/Title Dr Reyes   Method of Notification Electronic Page   Request Evaluate - Remote   Notification Reason Status Update     Pt requesting epidural after breakfast possibly.  Good time to consent pt.

## 2023-06-19 NOTE — PROVIDER NOTIFICATION
06/19/23 1340   Provider Notification   Provider Name/Title Dr. Hogan   Method of Notification In Department   Notification Reason Maternal Vital Sign Change;Status Update     RN updated Dr. Hogan on pt status. RN reported BPs, Dr. Hogan aware of BPs and vitals. RN updated Dr. Hogan on blood loss, Dr. Hogan aware.

## 2023-06-19 NOTE — PROGRESS NOTES
Induction of Labor Admit Note  Ema Rojas  MRN: [id number]  Gestational Age: 39w2d      Ema Rojas presents for induction of labor for polyhydramnios, history of term demise.  Patient admitted to room 475. Patient denies bleeding or ROM. Prenatal record reviewed.   OB History    Para Term  AB Living   3 2 2 0 0 1   SAB IAB Ectopic Multiple Live Births   0 0 0 0 1      # Outcome Date GA Lbr Polo/2nd Weight Sex Delivery Anes PTL Lv   3 Current            2 Term 21 37w6d  3.515 kg (7 lb 12 oz) M  EPI  PADMINI      Complications: Gestational diabetes      Name: Javier Morales Term 20 38w0d   F I.U. FETAL D   FD   .  Medical History:   Past Medical History:   Diagnosis Date     E. coli UTI 2020     Hyperthyroidism     she has seen endocrinology 2021     Iron deficiency anemia secondary to inadequate dietary iron intake 06/15/2020     Prior pregnancy with fetal demise 2020     Vital signs per doc flowsheet. Fetal movement present. Support person is present.  Action: Verbal consent for EFM, external fetal monitors applied. Fetal assessment: presumed adequate fetal oxygenation (see flowsheets). Pt taz every 2-10 minutes, mild to palpations, soft resting tone. Pt reports feeling occasional cramping. Admission assessment completed. Patient and support persons educated on labor process. Patient instructed to report change in fetal movement, contractions, vaginal leaking of fluid or bleeding, abdominal pain, or any concerns related to the pregnancy to her nurse/physician. Patient oriented to room, call light in reach.   Response: Dr. Reyes, G3 and  informed of patient arrival. Provider to come to bedside to assess patient and create plan for induction of labor. Patient verbalized understanding. Report given to Viet Delarosa at 2315 and care relinquished.

## 2023-06-19 NOTE — H&P
L&D History and Physical   2023  Ema Rojas  [id number]      HPI:   Ema Rojas is a 33 year old  at 39w2d by LMP c/w 14w0d US who presents for IOL for moderate polyhydramnios. Pregnancy complicated as below.    She states that she is feeling well today.  Denies LOF or VB. Good FM. Occasional contractions that are intermittently painful. No concerns    Her pregnancy has been complicated by:  - Moderate polyhydramnios  - Hx term IUFD  - Hx postpartum psychosis x2    OBHX:   OB History    Para Term  AB Living   3 2 2 0 0 1   SAB IAB Ectopic Multiple Live Births   0 0 0 0 1      # Outcome Date GA Lbr Polo/2nd Weight Sex Delivery Anes PTL Lv   3 Current            2 Term 21 37w6d  3.515 kg (7 lb 12 oz) M  EPI  PADMINI      Complications: Gestational diabetes      Name: Javier Morales Term 20 38w0d   F I.U. FETAL D   FD       Past Medical History:   Diagnosis Date     E. coli UTI 2020     Hyperthyroidism     she has seen endocrinology 2021     Iron deficiency anemia secondary to inadequate dietary iron intake 06/15/2020     Prior pregnancy with fetal demise 2020       Past Surgical History:   Procedure Laterality Date     NO HISTORY OF SURGERY         Medications:   No current facility-administered medications on file prior to encounter.  acetaminophen (TYLENOL) 325 MG tablet, Take 1-2 tablets (325-650 mg) by mouth every 6 hours as needed for mild pain  acetaminophen (TYLENOL) 500 MG tablet, Take 1-2 tablets (500-1,000 mg) by mouth every 6 hours as needed for mild pain  doxylamine (UNISOM) 25 MG TABS tablet, Take 0.5 tablets (12.5 mg) by mouth At Bedtime  ferrous gluconate (FERGON) 324 (38 Fe) MG tablet, Take 1 tablet (324 mg) by mouth daily (with breakfast)  metoclopramide (REGLAN) 5 MG tablet, Take 1 tablet (5 mg) by mouth 4 times daily (before meals and nightly)  OLANZapine (ZYPREXA) 10 MG tablet, Take 1 tablet (10 mg) by mouth At Bedtime  Prenatal Vit-DSS-Fe Cbn-FA  "(PRENATAL AD PO),   vitamin B6 (PYRIDOXINE) 100 MG tablet, Take 1 tablet (100 mg) by mouth 3 times daily        No Known Allergies    Family History   Problem Relation Age of Onset     No Known Problems Mother      No Known Problems Father      No Known Problems Maternal Grandmother      No Known Problems Maternal Grandfather      No Known Problems Paternal Grandmother      No Known Problems Paternal Grandfather      No Known Problems Brother      No Known Problems Sister      No Known Problems Brother      No Known Problems Sister      No Known Problems Sister      No Known Problems Sister        SocialHX:   Social History     Tobacco Use     Smoking status: Never     Smokeless tobacco: Never   Vaping Use     Vaping status: Never Used   Substance Use Topics     Alcohol use: Never     Drug use: Never       ROS: 10-point ROS negative except as indicated in HPI.    Physical Exam:  Vitals:    06/18/23 2206 06/18/23 2325 06/19/23 0103   BP: 105/62 104/60 99/57   BP Location: Right arm Right arm Right arm   Patient Position: Right side Supine Right side   Cuff Size: Adult Regular Adult Regular Adult Regular   Pulse: 82     Resp: 16 16 14   Temp: 97.2  F (36.2  C) 98.4  F (36.9  C) 98  F (36.7  C)   TempSrc: Oral Oral Oral   Weight: 60.1 kg (132 lb 9.6 oz)     Height: 1.448 m (4' 9\")       General: alert, oriented female, resting in bed in NAD  CV: well perfused,  Lungs: breathing comfortably on room air   Abdomen: soft, gravid, non-tender, EFW 8# by Leopold's.  Extremities: bilateral lower extremities non-tender with trace edema    SVE: 2/30/-3  Membranes: intact    Presentation: cephalic by BSUS, confirmed cephalic a second time after crum placement    FHT: baseline 125, moderate variability, + accelerations, no decelerations  [place]: 1-2 contractions q 10 min    Procedure:  Digital cervical exam performed in usual fashion. Crum catheter threaded through cervix until balloon confirmed inside internal os. Crum balloon " filled with 70 ml of sterile saline. Confirmed placement through cervix. Patient tolerated procedure well. Dr. Waters was present for the duration of the procedure.    Prenatal Labs:   Lab Results   Component Value Date    ABO A 2021    RH Pos 2021    AS Negative 2023    HEPBANG Nonreactive 2022    HGB 10.1 (L) 2023       GBS Status:   No results found for: GBS    No results found for: PAP    Labs:   Results for orders placed or performed during the hospital encounter of 23 (from the past 24 hour(s))   ABO/Rh type and screen    Narrative    The following orders were created for panel order ABO/Rh type and screen.  Procedure                               Abnormality         Status                     ---------                               -----------         ------                     Adult Type and Screen[[id number]]                            Final result                 Please view results for these tests on the individual orders.   CBC with platelets   Result Value Ref Range    WBC Count 7.1 4.0 - 11.0 10e3/uL    RBC Count 4.18 3.80 - 5.20 10e6/uL    Hemoglobin 10.1 (L) 11.7 - 15.7 g/dL    Hematocrit 33.4 (L) 35.0 - 47.0 %    MCV 80 78 - 100 fL    MCH 24.2 (L) 26.5 - 33.0 pg    MCHC 30.2 (L) 31.5 - 36.5 g/dL    RDW 15.3 (H) 10.0 - 15.0 %    Platelet Count 196 150 - 450 10e3/uL   Adult Type and Screen   Result Value Ref Range    ABO/RH(D) A POS     Antibody Screen Negative Negative    SPECIMEN EXPIRATION DATE [id number]        A/P:   33 year old  at 39w2d by LMP c/w 14w0d US who presents for IOL for moderate polyhydramnios. Pregnancy complicated by: hx term IUFD and hx postpartum psychosis x2.    #Induction of Labor  - Admit to labor and delivery for IOL  - Labs: CBC, T&S, RPR  - Pain: per patient request, considering epidural  - GBS negative  - Plan: Jimenez balloon placed with 70 ml. First dose of PV miso given, will continue PV miso while balloon in place. After  crum is out, will plan to reconfirm cephalic presentation given moderate polyhydramnios.    #Moderate polyhydramnios  - IOL as above. Rescan after crum out to confirm cephalic  - GCT wnl    #Hx postpartum psychosis x2  - Readmitted after both prior deliveries for postpartum psychosis  - Plan to discuss postpartum psych consult for any further management. Consider referral to Mother Baby program/Red Dauphin at [facility] [place].    #Fetal Well Being  - Category I FHT  - Continuous EFM  - Interventions PRN  - EFW 8# by Leopold's    #PNC:     - BMI 28  - Rh positive, Rubella immune, GCT wnl  - Hep B Antigen neg  - GBS neg  - Other infectious labs neg  - Placenta: anterior    Patient seen and care plan discussed under supervision of Dr. Waters.    Alyse Reyes MD  OB/GYN Resident, PGY-3

## 2023-06-19 NOTE — PLAN OF CARE
VSS on room air, afebrile. Desires epidural for pain control during labour. Denies LOF, bleeding, pre-e symptoms. Baby difficult to monitor due to polyhydramnios and excessive fetal movement, however category 1 strip for most of night, isolated late, isolated variable decel, see flowsheet. Pitocin and LR running.

## 2023-06-19 NOTE — PLAN OF CARE
of viable Male with Dr. Hogan and Dr. Simmons in attandance. Spouse present.  Infant with spontaneous cry, to mothers abdomen, dried and stimulated. Pitocin started. Placenta delivered without complication. Second-degree laceration with repair, corinne cares provided. Delivery  mL. Mother and baby in stable condition.     Handoff report given to Joy VILLAGOMEZ RN at 1400.

## 2023-06-19 NOTE — PROGRESS NOTES
"[facility] Labor and Delivery Progress Note    Ema Rojas MRN# [id number]   Age: 33 year old YOB: 1990           Subjective:   Comfortable            Objective:   Patient Vitals for the past 24 hrs:   BP Temp Temp src Pulse Resp Height Weight BMI (Calculated) Oximeter Heart Rate   23 0842 92/57 97.8  F (36.6  C) Oral -- 16 -- -- -- 64 bpm   23 0738 103/67 98.5  F (36.9  C) Oral -- 16 -- -- -- 82 bpm   23 0446 100/65 98.4  F (36.9  C) Oral -- 16 -- -- -- 72 bpm   23 0103 99/57 98  F (36.7  C) Oral -- 14 -- -- -- 82 bpm   23 2325 104/60 98.4  F (36.9  C) Oral -- 16 -- -- -- 71 bpm   23 2206 105/62 97.2  F (36.2  C) Oral 82 16 1.448 m (4' 9\") 60.1 kg (132 lb 9.6 oz) 28.69 --         Cervical Exam: 4.5 / 60% / -3      Position: Posterior    Membranes: Intact     Fetal Heart Rate:    Monitor: external US    Variability: moderate (amplitude range 6 to 25 bpm)    Baseline Rate: normal range    Fetal Heart Rate Tracing: reassuring, negative CST, reassuring NST    toco ctx's q 2 min.         Assessment:   Ema Rojas is a 33 year old  who is 39w2d here for IOL   Currently membranes intact   Jimenez out and on pitocin        Plan:   Will decrease pitocin to 2 mu/min from 4 mu/min   Plan birthing ball   Epidural when ready and the AROM.        Patience Hogan MD    "

## 2023-06-19 NOTE — PROGRESS NOTES
Labor progress note    Notifed by RN that CRB fell out. Patient having some contractions but coping well. SVE repeated and difficult secondary to patient discomfort but cervix 4-5/50/-3, remains cephalic by BSUS. FHTs 130/mod/+accels, no decels. Cat I. Contractions 2-3x/10 min. Plan to start DIVP. Patient planning epidural eventually, GBS negative.     Marissa Waters MD

## 2023-06-19 NOTE — PROVIDER NOTIFICATION
06/19/23 1330   Provider Notification   Provider Name/Title Dr. Hogan   Method of Notification Electronic Page   Request Evaluate - Remote   Notification Reason Maternal Vital Sign Change     RN reported BP 96/59 to Dr. Hogan.

## 2023-06-19 NOTE — PROVIDER NOTIFICATION
06/19/23 1114   Provider Notification   Provider Name/Title Dr. Hogan   Method of Notification Electronic Page   Request Evaluate in Person   Notification Reason Membrane Status;Status Update     RN reported that pt had large gush of clear fluid on toilet at 1110.

## 2023-06-19 NOTE — PROVIDER NOTIFICATION
06/19/23 0843   Provider Notification   Provider Name/Title Dr. Hogan   Method of Notification At Bedside   Notification Reason Status Update;Uterine Activity;Maternal Vital Sign Change;Other (Comment)  (strip review)     RN and Dr. Hogan reviewed strip for FHR and uterine activity. RN reported pts uterine activity and that RN got pt up to empty bladder and started 500 mL fluid bolus and reported pitocin rate. RN and Dr. Hogan discussed FHR. Dr. Hogan aware of FHR and uterine activity. RN reported pts VS, Dr. Hogan aware of VS and pts lower BP. Plan to get pt up on birthing ball.

## 2023-06-19 NOTE — PROGRESS NOTES
Mother and baby transferred to postpartum unit at 1415 via  after completion of immediate recovery period. Patient oriented to room and report received from Apolonia MARIN RN who assumes patient care. Mother and baby bonding well and in satisfactory condition upon transfer. Baby bands checked and verified with MATT Cid.

## 2023-06-20 VITALS
BODY MASS INDEX: 28.61 KG/M2 | SYSTOLIC BLOOD PRESSURE: 110 MMHG | HEIGHT: 57 IN | DIASTOLIC BLOOD PRESSURE: 77 MMHG | OXYGEN SATURATION: 100 % | TEMPERATURE: 98.5 F | HEART RATE: 60 BPM | RESPIRATION RATE: 16 BRPM | WEIGHT: 132.6 LBS

## 2023-06-20 LAB — HGB BLD-MCNC: 10.1 G/DL (ref 11.7–15.7)

## 2023-06-20 PROCEDURE — 250N000013 HC RX MED GY IP 250 OP 250 PS 637

## 2023-06-20 PROCEDURE — 36415 COLL VENOUS BLD VENIPUNCTURE: CPT

## 2023-06-20 PROCEDURE — 99253 IP/OBS CNSLTJ NEW/EST LOW 45: CPT

## 2023-06-20 PROCEDURE — 85018 HEMOGLOBIN: CPT

## 2023-06-20 RX ORDER — AMOXICILLIN 250 MG
1 CAPSULE ORAL 2 TIMES DAILY PRN
Qty: 60 TABLET | Refills: 0 | Status: SHIPPED | OUTPATIENT
Start: 2023-06-20

## 2023-06-20 RX ORDER — OLANZAPINE 2.5 MG/1
2.5 TABLET, FILM COATED ORAL AT BEDTIME
Status: DISCONTINUED | OUTPATIENT
Start: 2023-06-20 | End: 2023-06-20 | Stop reason: HOSPADM

## 2023-06-20 RX ORDER — IBUPROFEN 600 MG/1
600 TABLET, FILM COATED ORAL EVERY 6 HOURS PRN
Qty: 20 TABLET | Refills: 0 | Status: SHIPPED | OUTPATIENT
Start: 2023-06-20

## 2023-06-20 RX ORDER — OLANZAPINE 2.5 MG/1
2.5 TABLET, FILM COATED ORAL AT BEDTIME
Qty: 30 TABLET | Refills: 0 | Status: SHIPPED | OUTPATIENT
Start: 2023-06-20

## 2023-06-20 RX ORDER — ACETAMINOPHEN 325 MG/1
650 TABLET ORAL EVERY 4 HOURS PRN
Qty: 60 TABLET | Refills: 0 | Status: SHIPPED | OUTPATIENT
Start: 2023-06-20

## 2023-06-20 RX ADMIN — IBUPROFEN 800 MG: 800 TABLET, FILM COATED ORAL at 01:57

## 2023-06-20 RX ADMIN — IBUPROFEN 800 MG: 800 TABLET, FILM COATED ORAL at 09:28

## 2023-06-20 RX ADMIN — DOCUSATE SODIUM 100 MG: 100 CAPSULE, LIQUID FILLED ORAL at 09:28

## 2023-06-20 ASSESSMENT — ACTIVITIES OF DAILY LIVING (ADL)
ADLS_ACUITY_SCORE: 20

## 2023-06-20 NOTE — DISCHARGE SUMMARY
DISCHARGE SUMMARY    Ema Rojas  : 1990  MRN: [id number]    Admit date: 2023  Discharge date: 23    Admit Dx:   - 33 year old  at 39w2d   - Moderate polyhydramnios  - H/o IUFD  - H/o postpartum psychosis    Discharge Dx:  - Same as above, s/p     Procedures:  - Spontaneous vaginal delivery    Admit HPI:  Ms. Ema Rojas is a  at 39w2d who was admitted for IOL for moderate polyhydramnios on 2023. Please see her admit H&P for full details of her PMH, PSH, Meds, Allergies and exam on admit.    Hospital course:  Ema Rojas was admitted to the hospital on 2023 for the above listed indications. She underwent induction with a Jimenez. She was started on Pitocin. Her labor progressed appropriately. She had SROM. She progressed to complete dilation at 11:31 AM. On 23 at 11:39 AM, she delivered a viable male infant in the YOAN position. APGARS were 9/9. Weight was 3430 grams. EBL from the delivery was 100. Please see her Delivery Summary for full details regarding her delivery.    Her postpartum course was uncomplicated. On PPD#1, she was meeting all of her postpartum goals and deemed stable for discharge. She was voiding without difficulty, tolerating a regular diet without nausea and vomiting, her pain was well controlled on oral pain medicines and her lochia was appropriate. Her hemoglobin prior to delivery was 10.4 and after delivery was 10.1. Her Rh status was pos, and Rhogam was not indicated.     She met with psychiatry postpartum will continue zyprexa PP and follow up    Discharge Medications:  Current Discharge Medication List        START taking these medications    Details   ibuprofen (ADVIL/MOTRIN) 600 MG tablet Take 1 tablet (600 mg) by mouth every 6 hours as needed for moderate pain  Qty: 20 tablet, Refills: 0    Associated Diagnoses:  (spontaneous vaginal delivery)      senna-docusate (SENOKOT-S/PERICOLACE) 8.6-50 MG tablet Take 1 tablet by mouth 2 times  daily as needed for constipation  Qty: 60 tablet, Refills: 0    Associated Diagnoses:  (spontaneous vaginal delivery)           CONTINUE these medications which have CHANGED    Details   acetaminophen (TYLENOL) 325 MG tablet Take 2 tablets (650 mg) by mouth every 4 hours as needed for mild pain  Qty: 60 tablet, Refills: 0    Associated Diagnoses:  (spontaneous vaginal delivery)      OLANZapine (ZYPREXA) 2.5 MG tablet Take 1 tablet (2.5 mg) by mouth At Bedtime  Qty: 30 tablet, Refills: 0    Associated Diagnoses: History of psychosis           CONTINUE these medications which have NOT CHANGED    Details   doxylamine (UNISOM) 25 MG TABS tablet Take 0.5 tablets (12.5 mg) by mouth At Bedtime  Qty: 30 tablet, Refills: 4    Comments: 12.5 mg in the morning  12.5 mg in the afternoon  Associated Diagnoses: Nausea and vomiting in pregnancy      ferrous gluconate (FERGON) 324 (38 Fe) MG tablet Take 1 tablet (324 mg) by mouth daily (with breakfast)  Qty: 60 tablet, Refills: 3    Associated Diagnoses: High-risk pregnancy in second trimester      metoclopramide (REGLAN) 5 MG tablet Take 1 tablet (5 mg) by mouth 4 times daily (before meals and nightly)  Qty: 90 tablet, Refills: 4    Associated Diagnoses: Nausea and vomiting in pregnancy      Prenatal Vit-DSS-Fe Cbn-FA (PRENATAL AD PO)       vitamin B6 (PYRIDOXINE) 100 MG tablet Take 1 tablet (100 mg) by mouth 3 times daily  Qty: 90 tablet, Refills: 4    Associated Diagnoses: Nausea and vomiting in pregnancy                 Discharge/Disposition:  Ema Rojas was discharged to home in stable condition with the following instructions/medications:  1) Call for temperature > 100.4, bright red vaginal bleeding >1 pad an hour x 2 hours, foul smelling vaginal discharge, pain not controlled by usual oral pain meds, persistent nausea and vomiting not controlled on medications  2) She is undecided on BC  3) For feeding she decided to Breast.  4) She was instructed to follow-up with  her primary OB in 6 weeks for a routine postpartum visit and 1 week mood check.  5? Continue zyprexa, and follow up with psych      Gideon Simmons DO, MA  OBGYN-PGY      Physician Attestation   I saw and evaluated this patient prior to discharge.  I discussed the patient with the resident/fellow and agree with plan of care as documented in the note.      I personally reviewed vital signs, medications, labs, and NST .    I personally spent 15 minutes on discharge activities.    Mely Lundberg MD  Date of Service (when I saw the patient): 06/20/23

## 2023-06-20 NOTE — DISCHARGE INSTRUCTIONS
Future Appts:    Date: Monday, 6/26/2023  Time: 9:00 am - 10:00 am  Provider: Sania Miller MS, CNP,RN  Location: [facility], [address] [place] [address]  Phone: [phone]  Type: Telepsychiatry    Patient Instructions  The nurse may be calling you before your appointment. Please check in to your patient portal to complete required paperwork prior to your appt. Please ask your doctor to fax relevant records to [phone]. Please arrive 30 minutes prior to your first appointment, and bring your insurance card and photo identification. For Telepsychiatry, a zoom link for your appointment will be sent via text and email. As a reminder, you must be in [place] in order to receive telehealth services with us.      Warning Signs after Having a Baby    Keep this paper on your fridge or somewhere else where you can see it.    Call your provider if you have any of these symptoms up to 12 weeks after having your baby.    Thoughts of hurting yourself or your baby  Pain in your chest or trouble breathing  Severe headache not helped by pain medicine  Eyesight concerns (blurry vision, seeing spots or flashes of light, other changes to eyesight)  Fainting, shaking or other signs of a seizure    Call 9-1-1 if you feel that it is an emergency.     The symptoms below can happen to anyone after giving birth. They can be very serious. Call your provider if you have any of these warning signs.    My provider s phone number: _______________________    Losing too much blood (hemorrhage)    Call your provider if you soak through a pad in less than an hour or pass blood clots bigger than a golf ball. These may be signs that you are bleeding too much.    Blood clots in the legs or lungs    After you give birth, your body naturally clots its blood to help prevent blood loss. Sometimes this increased clotting can happen in other areas of the body, like the legs or lungs. This can block your blood flow and  be very dangerous.     Call your provider if you:  Have a red, swollen spot on the back of your leg that is warm or painful when you touch it.   Are coughing up blood.     Infection    Call your provider if you have any of these symptoms:  Fever of 100.4 F (38 C) or higher.  Pain or redness around your stitches if you had an incision.   Any yellow, white, or green fluid coming from places where you had stitches or surgery.    Mood Problems (postpartum depression)    Many people feel sad or have mood changes after having a baby. But for some people, these mood swings are worse.     Call your provider right away if you feel so anxious or nervous that you can't care for yourself or your baby.    Preeclampsia (high blood pressure)    Even if you didn't have high blood pressure when you were pregnant, you are at risk for the high blood pressure disease called preeclampsia. This risk can last up to 12 weeks after giving birth.     Call your provider if you have:   Pain on your right side under your rib cage  Sudden swelling in the hands and face    Remember: You know your body. If something doesn't feel right, get medical help.     For informational purposes only. Not to replace the advice of your health care provider. Copyright 2020 [facility]. All rights reserved. Clinically reviewed by Elida Varela, RNC-OB, MSN. [employer] [id number] - Rev 02/23.

## 2023-06-20 NOTE — CONSULTS
Initial Psychiatric Consult   Consult date: 2023         Reason for Consult, requesting source:    History of post partum psychosis   Requesting source: Ob/Gyn    Labs and imaging reviewed. Patient seen and evaluated by YOJANA Cristina CNP          HPI:   Ema Rojas is a 33 year old  with a history of post-partum psychosis x2 previously on trazodone, olanzapine, and Abilify who had vaginal delivery of baby . Psychiatry was consulted after delivery due to history of post-partum psychosis. I have reviewed prior psych notes and historically patient experienced rapid resolution of psychotic symptoms with olanzapine. Luckily,  is very involved and supportive stated he would be able to take care of the baby most of the night so that we could protect Ema's sleep. It appears that patient's psychosis/manic sxs have developed around 2 months after delivery and include ani with pacing, decreased need for sleep, Hindu preoccupation/delusion, auditory hallucinations, decreased appetite.   Patient was euthymic today and very pleasant. She reported stopping her zyprexa when she got discharged, taking it only for a month or so after last psychiatric hospitalization discharge. She has continued to see her therapist but has not seen a psychiatrist since being discharged from  psych she reports. Denies current SI/HI/AVH, no evidence of psychosis or ani.         Past Psychiatric History:   Prior diagnoses: previous psychiatric diagnoses include postpartum psychosis/ani.   Hospitalizations: Patient was hospitalized at at [facility] from  until  for psychosis and ani. Also patient was hospitalized in 2020, history of refusing medications as prescribed by psychiatry. Last hospitalization was -22 stabilized and discharged on Zyprexa.   Court Committments: None  Suicide attempts: None per patient report. None per   Self-injurious behavior: None  per patient report and   Guns: no  Violence: None per patient report and   ECT: None per chart review   TMS: None per patient report     Psychiatry Medication Trials:  Trazodone for sleep  Aripiprazole (discontinued by patient)   Olanzapine, patient stopped taking after last admission        Substance Use and History:   Alcohol: none  Nicotine: none  Illicit Substances: none  Chemical Dependency Treatment: none        Past Medical History:   PAST MEDICAL HISTORY:   Past Medical History:   Diagnosis Date     E. coli UTI 1/13/2020     Hyperthyroidism     she has seen endocrinology 03/2021     Iron deficiency anemia secondary to inadequate dietary iron intake 06/15/2020     Prior pregnancy with fetal demise 07/2020       PAST SURGICAL HISTORY:   Past Surgical History:   Procedure Laterality Date     NO HISTORY OF SURGERY               Family History:   FAMILY HISTORY:   Family History   Problem Relation Age of Onset     No Known Problems Mother      No Known Problems Father      No Known Problems Maternal Grandmother      No Known Problems Maternal Grandfather      No Known Problems Paternal Grandmother      No Known Problems Paternal Grandfather      No Known Problems Brother      No Known Problems Sister      No Known Problems Brother      No Known Problems Sister      No Known Problems Sister      No Known Problems Sister        Family Psychiatric History: unknown         Social History:   SOCIAL HISTORY:   Social History     Tobacco Use     Smoking status: Never     Smokeless tobacco: Never   Vaping Use     Vaping status: Never Used   Substance Use Topics     Alcohol use: Never      grew up in [place]. She came to [place] in [place] to [place]. She moved to [place], [place] and then to [place].           Physical ROS:   The 10 point Review of Systems is negative other than noted in the HPI or here.           Medications:       docusate sodium  100 mg Oral Daily              Allergies:   No Known Allergies        "Labs:     Recent Results (from the past 48 hour(s))   Treponema Abs w Reflex to RPR and Titer    Collection Time: 06/19/23 12:12 AM   Result Value Ref Range    Treponema Antibody Total Nonreactive Nonreactive   CBC with platelets    Collection Time: 06/19/23 12:12 AM   Result Value Ref Range    WBC Count 7.1 4.0 - 11.0 10e3/uL    RBC Count 4.18 3.80 - 5.20 10e6/uL    Hemoglobin 10.1 (L) 11.7 - 15.7 g/dL    Hematocrit 33.4 (L) 35.0 - 47.0 %    MCV 80 78 - 100 fL    MCH 24.2 (L) 26.5 - 33.0 pg    MCHC 30.2 (L) 31.5 - 36.5 g/dL    RDW 15.3 (H) 10.0 - 15.0 %    Platelet Count 196 150 - 450 10e3/uL   Adult Type and Screen    Collection Time: 06/19/23 12:12 AM   Result Value Ref Range    ABO/RH(D) A POS     Antibody Screen Negative Negative    SPECIMEN EXPIRATION DATE [id number]    Hemoglobin    Collection Time: 06/20/23  9:31 AM   Result Value Ref Range    Hemoglobin 10.1 (L) 11.7 - 15.7 g/dL          Physical and Psychiatric Examination:     BP (P) 110/77 (BP Location: Left arm, Patient Position: Semi-Fry's, Cuff Size: Adult Regular)   Pulse (P) 60   Temp (P) 98.5  F (36.9  C) (Oral)   Resp (P) 16   Ht 1.448 m (4' 9\")   Wt 60.1 kg (132 lb 9.6 oz)   LMP 09/17/2022   SpO2 100%   Breastfeeding Yes   BMI 28.69 kg/m    Weight is 132 lbs 9.6 oz  Body mass index is 28.69 kg/m .    Physical Exam:  I have reviewed the physical exam as documented by by the medical team and agree with findings and assessment and have no additional findings to add at this time.    Mental Status Exam:    Appearance: awake, alert, adequately groomed and dressed in hospital scrubs  Attitude:  cooperative  Eye Contact:  good  Mood:  good  Affect:  appropriate and in normal range and mood congruent  Speech:  clear, coherent  Language: Fluent in english   Psychomotor Behavior:  no evidence of tardive dyskinesia, dystonia, or tics  Thought Process:  logical, linear and goal oriented  Associations:  no loose associations  Thought Content:  " no evidence of suicidal ideation or homicidal ideation and no evidence of psychotic thought  Insight:  fair  Judgement:  fair  Oriented to:  time, person, and place  Attention Span and Concentration:  intact  Recent and Remote Memory:  intact  Fund of Knowledge: Appropriate   Gait and Station: baseline                DSM-5 Diagnosis:   History of post-partum ani with psychotic features           Assessment/Plan:   Ema is a 33 year old female with a history of post-partum ani and psychosis with both her pregnancies. She has a history of self-tapering/discontinuing medications due to concerns for baby's well-being. She was educated that olanzapine is not only compatible with a healthy pregnancy but also breast feeding and she is very likely to experience repeat episode of post-partum psychosis/ani. She was educated that maintaining maternal mental well-being is paramount in ensuring a healthy/safe child and was advised to continue olanzapine unless otherwise directed by her outpatient doctor. Discussed starting Olanzapine at a lower dose and can be increased by her outpatient provider. She stated agreement with these recommendations and had no further questions.     1. Zyprexa 2.5mg at bedtime   2. Made appt for pt to follow-up with psychiatrist. Info in AVS.   3. Appreciate assistance providing pt and  info on [facility] red [facility] clinic       Jennifer Forman, [facility]-BC  Consult/Liaison Psychiatry   [facility]

## 2023-06-20 NOTE — PROGRESS NOTES
VSS. Pain controlled with IV toradol x1. FFU-2. Scant lochia. Voiding. Patient attempting to  with each feeding prior to offering formula to baby. Patient utilized breast pump this evening. Bonding well with baby.

## 2023-06-20 NOTE — CONSULTS
Care Management Initial Consult    General Information  Assessment completed with: Patient, Spouse or significant other, Ebise & Abulu  Type of CM/SW Visit: Initial Assessment    Primary Care Provider verified and updated as needed:     Readmission within the last 30 days:           Advance Care Planning:          Communication Assessment  Patient's communication style: spoken language (English or Bilingual) (speaks [place])    Hearing Difficulty or Deaf: no   Wear Glasses or Blind: no    Cognitive  Cognitive/Neuro/Behavioral: WDL                      Living Environment:   People in home: spouse, child(hermes), dependent     Current living Arrangements:        Able to return to prior arrangements: yes  Living Arrangement Comments: Live in an apartment in [place], [place]    Family/Social Support:  Care provided by:    Provides care for: child(hermes)  Marital Status:             Description of Support System: Supportive       Current Resources:   Patient receiving home care services:       Community Resources:    Equipment currently used at home: none  Supplies currently used at home:      Employment/Financial:  Employment Status:    Abulu works FT     Financial Concerns:   Finances are tight as Ema is not currently working.     Does the patient's insurance plan have a 3 day qualifying hospital stay waiver?     Lifestyle & Psychosocial Needs:  Social Determinants of Health     Tobacco Use: Low Risk  (6/19/2023)    Patient History      Smoking Tobacco Use: Never      Smokeless Tobacco Use: Never      Passive Exposure: Not on file   Alcohol Use: Not At Risk (4/8/2021)    AUDIT-C      Frequency of Alcohol Consumption: Never      Average Number of Drinks: Not on file      Frequency of Binge Drinking: Not on file   Financial Resource Strain: Not on file   Food Insecurity: Not on file   Transportation Needs: Not on file   Physical Activity: Not on file   Stress: Not on file   Social Connections: Not on file    Intimate Partner Violence: Not on file   Depression: Not at risk (5/30/2023)    PHQ-2      PHQ-2 Score: 0   Housing Stability: Not on file   Postpartum Depression: Not on file (6/20/2023)     Mental Health Status:  Mental Health Status: Past Concern  Mental Health Management: Individual Therapy.  Patient has a history of post-partum psychosis x2.  Symptoms started both times around 2 months post partum.  Patient was hospitalized as an inpatient both times.    Chemical Dependency Status:  Chemical Dependency Status: No Current Concerns           Values/Beliefs:  Spiritual, Cultural Beliefs, Latter day Practices, Values that affect care: yes  Description of Beliefs that Will Affect Care: Congregation beliefs; strong geri        Additional Information:  This writer completed a psychosocial assessment with Ema and Juana.  Consult was due to a history of post-partum psychosis with both of her previous pregnancies.  One was stillborn at 38w.  The couple also has a son who is 18 months old.  Juana works FT but reports finances at tight due to Ema not currently working.  This writer provided a parking exit.  Juana also inquired about circumcision and where it can be done for free.    This writer provider the name of several local clinics, [facility] and [facility] that have sliding fees. Ema reports currently she feels well but tired.  She is concerned about having another episode of psychosis.  She does have a therapist and will make an appointment to see her in the next few weeks.  Lack of sleep also appears to be a trigger.  Juana states he will get up with the baby at night so Ema can rest.  Ema has not followed up with medications prescribed during her psychiatric hospitalizations.  She does not like the side effects and does not believe strongly in Western medicine.  Ema is motivated to avoid another episode of psychosis and hospitalization.  Family has not other needs at this time.  They are anxious to get home  to their 18 month old son.  They do not have family in [place] so must rely on friends and neighbors.  Ema is hoping to discharge sometime today.    Palak GRAF, MSW, [facility]  Maternal Child Health   [phone]--office  [phone]--pager

## 2023-06-20 NOTE — PLAN OF CARE
Goal Outcome Evaluation:      Plan of Care Reviewed With: patient    Overall Patient Progress: improvingOverall Patient Progress: improving     AFVSS. Postpartum assessments WDL. Patient is ambulating and voiding without difficulty. Fundus firm with scant flow. Perineal pain and cramping controlled with ibuprofen. Patient using ice packs and tucks to perineum. She is breastfeeding with minimal assistance. She is also formula feeding baby per bottle per her choice. Positive attachment behaviors observed with parents and infant. EDS completed-0. Patient to have psych.consult for hx. Of postpartum psychosis. Birth certificate completed. Continue to provide support and education as needed. Continue present cares.

## 2023-06-20 NOTE — PLAN OF CARE
Goal Outcome Evaluation:    Data: Vital signs stable, postpartum assessments within normal limits.   Eating and drinking without nausea or vomiting.  Up ad jadon, and voiding without difficulty. Passing gas/BM.  Feeding baby independently-  formula and breastfeeding  Pain managed with tylenol and ibuprofen. Pt states she is comfortable.  Perineum appears to be healing well, no s/s infection.  Discharge outcomes on care plan met.   Action: Review of care plan, teaching, and discharge instructions done.  Response: Patient states understanding and comfort with her discharge instructions and plan of care. All questions addressed. She will discharge home.

## 2023-06-20 NOTE — PROGRESS NOTES
"Postpartum Progress Note  Ema Rojas  [id number]    Subjective:   Patient reports she is doing ok this morning, just feeling very tired. Ambulating without dizziness, eating and drinking without nausea. Lochia appropriate. Voiding spontaneously. Pain well controlled on oral medications. Patient wanting to sleep, does not want to answer any further questions.    Objective:  /64 (BP Location: Left arm, Patient Position: Semi-Fry's, Cuff Size: Adult Regular)   Pulse 88   Temp 98.4  F (36.9  C) (Oral)   Resp 16   Ht 1.448 m (4' 9\")   Wt 60.1 kg (132 lb 9.6 oz)   LMP 2022   SpO2 100%   Breastfeeding Yes   BMI 28.69 kg/m      General: NAD  Heart: Regular rate, extremities warm and well-perfused  Lungs: Breathing comfortably, on room air  Abdomen: Soft, non-tender, non-distended; fundus firm and below umbilicus  Extremities: trace edema in BLE    Assessment/Plan: 33 year old  who is PPD#1 s/p . Pregnancy was notable for moderate polyhydramios, hx term IUFD, and hx postpartum psychosis. Currently stable and doing well.     # Hx postpartum psychosis  - SW and psych consult, appreciate recs     # Postpartum  - Continue routine post-partum cares.     - Heme: Appropriate blood loss during delivery. AM hgb ordered. Continue to monitor for s/s of anemia. Repeat labs prn.  - Pain: Continue tylenol, ibuprofen  - GI: PRN bowel regimen, anti-emetics.  - : Voiding spontaneously  - Baby:  Stable, breast feeding  - Contraception: Need to discuss  - Rh pos, Rubella immune  - PPx: Encourage ambulation    Dispo: Discharge to home when meeting postpartum goals, today vs tomorrow.    Alyse Reyes MD  OB/GYN Resident, PGY-3          Physician Attestation   I personally examined and evaluated this patient.  I discussed the patient with the resident/fellow and care team, and agree with the assessment and plan of care as documented in the note.     Key findings:   Hemoglobin   Date Value Ref Range " Status   06/20/2023 10.1 (L) 11.7 - 15.7 g/dL Final   06/19/2023 10.1 (L) 11.7 - 15.7 g/dL Final   05/12/2021 13.3 11.7 - 15.7 g/dL Final   03/08/2021 13.1 11.7 - 15.7 g/dL Final     Doing well   Desires discharge today. Recommended evaluation by psychiatry prior to discharge given history of postpartum psychosis. She agrees with this plan.   Discussed outpatient resources - plan mood check within one week.   Discussed can call our clinic and reach a nurse 24 hours a day.     Please see A&P for additional details of medical decision making.      Mely Lundberg MD  Date of Service (when I saw the patient): 06/20/23

## 2023-07-07 ENCOUNTER — TELEPHONE (OUTPATIENT)
Dept: OBGYN | Facility: CLINIC | Age: 33
End: 2023-07-07
Payer: COMMERCIAL

## 2023-07-07 NOTE — TELEPHONE ENCOUNTER
Attempted to call patient - no answer and voicemail not set up so unable to leave a message.  Sent [employer] with scheduling options.    Arleth Victor RN    ----- Message -----   From: Patience Hogan MD   Sent: 7/7/2023  12:04 PM CDT   To: Rd Reception   Subject: PP f/u                                           Patient has been discharged and needs the following appointments scheduled:     In person 6 Week Routine Post-partum and mood check Visit     Provider appointments should be scheduled with Patience Hogan MD       Please call patient to schedule. Thank you!

## 2023-08-15 ENCOUNTER — PRENATAL OFFICE VISIT (OUTPATIENT)
Dept: OBGYN | Facility: CLINIC | Age: 33
End: 2023-08-15
Payer: COMMERCIAL

## 2023-08-15 ENCOUNTER — MEDICAL CORRESPONDENCE (OUTPATIENT)
Dept: OBGYN | Facility: CLINIC | Age: 33
End: 2023-08-15

## 2023-08-15 VITALS
DIASTOLIC BLOOD PRESSURE: 68 MMHG | BODY MASS INDEX: 25.71 KG/M2 | HEART RATE: 78 BPM | WEIGHT: 118.8 LBS | SYSTOLIC BLOOD PRESSURE: 110 MMHG | TEMPERATURE: 98.3 F

## 2023-08-15 DIAGNOSIS — Z12.4 SCREENING FOR CERVICAL CANCER: ICD-10-CM

## 2023-08-15 PROCEDURE — 99207 PR POST PARTUM EXAM: CPT | Performed by: OBSTETRICS & GYNECOLOGY

## 2023-08-15 PROCEDURE — 87624 HPV HI-RISK TYP POOLED RSLT: CPT | Performed by: OBSTETRICS & GYNECOLOGY

## 2023-08-15 PROCEDURE — G0145 SCR C/V CYTO,THINLAYER,RESCR: HCPCS | Performed by: OBSTETRICS & GYNECOLOGY

## 2023-08-15 PROCEDURE — G0124 SCREEN C/V THIN LAYER BY MD: HCPCS | Performed by: PATHOLOGY

## 2023-08-15 ASSESSMENT — ANXIETY QUESTIONNAIRES
5. BEING SO RESTLESS THAT IT IS HARD TO SIT STILL: NOT AT ALL
2. NOT BEING ABLE TO STOP OR CONTROL WORRYING: NOT AT ALL
1. FEELING NERVOUS, ANXIOUS, OR ON EDGE: NOT AT ALL
IF YOU CHECKED OFF ANY PROBLEMS ON THIS QUESTIONNAIRE, HOW DIFFICULT HAVE THESE PROBLEMS MADE IT FOR YOU TO DO YOUR WORK, TAKE CARE OF THINGS AT HOME, OR GET ALONG WITH OTHER PEOPLE: NOT DIFFICULT AT ALL
GAD7 TOTAL SCORE: 0
6. BECOMING EASILY ANNOYED OR IRRITABLE: NOT AT ALL
GAD7 TOTAL SCORE: 0
3. WORRYING TOO MUCH ABOUT DIFFERENT THINGS: NOT AT ALL
7. FEELING AFRAID AS IF SOMETHING AWFUL MIGHT HAPPEN: NOT AT ALL

## 2023-08-15 ASSESSMENT — PATIENT HEALTH QUESTIONNAIRE - PHQ9
5. POOR APPETITE OR OVEREATING: NOT AT ALL
SUM OF ALL RESPONSES TO PHQ QUESTIONS 1-9: 0

## 2023-08-15 NOTE — PROGRESS NOTES
/SUBJECTIVE:  Ema Rojas is a 33 year old female   here for a postpartum visit.  She had a  on 23 delivering a healthy baby boy weighing 7 lbs 9 oz at term.    No PP depression this time around.      delivery complications:  No  breast feeding:  No, milk supple dried up with her first period  bladder problems:  No  bowel problems/hemorrhoids:  No  episiotomy/laceration/incision healed? Yes  vaginal flow:  None, had her first period on 23  [place]:  Yes, NFP  contraception:  discussed multiple options, she will not tolerate an IUD placement  emotional adjustment:  doing well and happy      No results found for: PAP -- done today.         2023    10:34 AM 2023    12:16 PM 8/15/2023    10:52 AM   PHQ   PHQ-9 Total Score 0 1 0   Q9: Thoughts of better off dead/self-harm past 2 weeks Not at all Not at all Not at all           2023    10:42 AM 2023    12:16 PM 8/15/2023    10:52 AM   NICHOLAS-7 SCORE   Total Score 0 (minimal anxiety)     Total Score 0 0 0           OBJECTIVE:  Blood pressure 110/68, pulse 78, temperature 98.3  F (36.8  C), temperature source Temporal, weight 53.9 kg (118 lb 12.8 oz), last menstrual period 2023, not currently breastfeeding.   General - pleasant female in no acute distress.  Abdomen - soft, nontender, nondistended, no hepatosplenomegaly.  Pelvic - EG: normal adult female, BUS: within normal limits, Vagina: well rugated, no discharge, Cervix: no lesions or CMT, Uterus: firm, normal sized and nontender, Adnexae: no masses or tenderness.  Rectovaginal - deferred.    ASSESSMENT:  (Z39.2) Routine postpartum follow-up  (primary encounter diagnosis)  Comment: Reviewed contraception, family planning and breast feeding expectations.   We discussed oral contraceptive, vaginal contraceptive ring, birth control patch, condoms, IUD, Depo-Provera, and Nexplanon for birth control.  She was given information on all of this to take home and review with her  .  She will come back if she has further questions.  She would not tolerate an IUD placement.  So encouraged to consider OCPs or Nexplanon.  Plan: Pap screen with HPV - recommended age 30 - 65         years             (Z12.4) Screening for cervical cancer  Comment:    Plan: Pap screen with HPV - recommended age 30 - 65         years              Patience Hogan MD

## 2023-08-20 LAB
BKR LAB AP GYN ADEQUACY: ABNORMAL
BKR LAB AP GYN INTERPRETATION: ABNORMAL
BKR LAB AP HPV REFLEX: ABNORMAL
BKR LAB AP LMP: ABNORMAL
BKR LAB AP PREVIOUS ABNORMAL: ABNORMAL
PATH REPORT.COMMENTS IMP SPEC: ABNORMAL
PATH REPORT.COMMENTS IMP SPEC: ABNORMAL
PATH REPORT.RELEVANT HX SPEC: ABNORMAL

## 2023-08-22 PROBLEM — R87.610 ASCUS OF CERVIX WITH NEGATIVE HIGH RISK HPV: Status: ACTIVE | Noted: 2023-08-22

## 2023-08-22 LAB
HUMAN PAPILLOMA VIRUS 16 DNA: NEGATIVE
HUMAN PAPILLOMA VIRUS 18 DNA: NEGATIVE
HUMAN PAPILLOMA VIRUS FINAL DIAGNOSIS: NORMAL
HUMAN PAPILLOMA VIRUS OTHER HR: NEGATIVE

## 2024-05-11 ENCOUNTER — HEALTH MAINTENANCE LETTER (OUTPATIENT)
Age: 34
End: 2024-05-11

## 2024-09-12 ENCOUNTER — OFFICE VISIT (OUTPATIENT)
Dept: URGENT CARE | Facility: URGENT CARE | Age: 34
End: 2024-09-12
Payer: COMMERCIAL

## 2024-09-12 VITALS
HEART RATE: 92 BPM | OXYGEN SATURATION: 100 % | SYSTOLIC BLOOD PRESSURE: 128 MMHG | DIASTOLIC BLOOD PRESSURE: 87 MMHG | WEIGHT: 120.8 LBS | TEMPERATURE: 97.9 F | RESPIRATION RATE: 20 BRPM | BODY MASS INDEX: 26.14 KG/M2

## 2024-09-12 DIAGNOSIS — Z20.822 EXPOSURE TO 2019 NOVEL CORONAVIRUS: ICD-10-CM

## 2024-09-12 DIAGNOSIS — B34.9 VIRAL ILLNESS: Primary | ICD-10-CM

## 2024-09-12 PROCEDURE — 99213 OFFICE O/P EST LOW 20 MIN: CPT

## 2024-09-12 PROCEDURE — 87635 SARS-COV-2 COVID-19 AMP PRB: CPT

## 2024-09-13 LAB — SARS-COV-2 RNA RESP QL NAA+PROBE: NEGATIVE

## 2024-09-13 NOTE — PATIENT INSTRUCTIONS
COVID test is pending.  We will contact you within 1-2 business days if it is positive.  Get plenty of rest and drink fluids.  Can use Tylenol and/or ibuprofen as needed for pain.  Maximum dose of Tylenol is 4000mg in a 24 hour period of time.  Take ibuprofen with food to avoid stomach upset.

## 2024-09-13 NOTE — PROGRESS NOTES
ASSESSMENT:  (B34.9) Viral illness  (primary encounter diagnosis)    (Z20.822) Exposure to 2019 novel coronavirus  Plan: Symptomatic COVID-19 Virus (Coronavirus) by PCR        Nose    PLAN:  Informed the patient that the COVID test is pending and we will contact her within 1-2 business days if it is positive.  We discussed that her symptoms are likely related to a viral illness pending the COVID test result.  We also discussed the need for her to get plenty rest, drink fluids and use Tylenol and or ibuprofen as needed for pain with the maximum dose of Tylenol being 4000 mg in a 24-hour period of time and to take ibuprofen with food to avoid upset stomach.  Work note provided.  Discussed the need to return to clinic with any new or worsening symptoms.  Patient acknowledged her understanding of the above plan.    The use of Dragon/RevTraxation services may have been used to construct the content in this note; any grammatical or spelling errors are non-intentional. Please contact the author of this note directly if you are in need of any clarification.      YOJANA Andre CNP      SUBJECTIVE:   Ema Rojas is a 34 year old female presenting with a chief complaint of runny nose, headache, and fatigue.  Onset of symptoms was 4 day(s) ago.  Course of illness is same.    Patient denies: cough - non-productive, ear pain, sore throat, vomiting, and diarrhea  Treatment measures tried include None tried.  Predisposing factors include exposure to COVID at work.    ROS:  Negative except noted above.    OBJECTIVE:  /87   Pulse 92   Temp 97.9  F (36.6  C) (Tympanic)   Resp 20   Wt 54.8 kg (120 lb 12.8 oz)   SpO2 100%   BMI 26.14 kg/m    GENERAL APPEARANCE: healthy, alert and no distress  EYES: EOMI,  PERRL, conjunctiva clear  HENT: nose and mouth without erythema, ulcers or lesions and oral mucous membranes moist, no erythema noted  NECK: supple, nontender, no lymphadenopathy  RESP: lungs clear to  auscultation - no rales, rhonchi or wheezes  CV: regular rates and rhythm, normal S1 S2, no murmur noted  SKIN: no suspicious lesions or rashes

## 2025-01-15 ENCOUNTER — TELEPHONE (OUTPATIENT)
Dept: MIDWIFE SERVICES | Facility: CLINIC | Age: 35
End: 2025-01-15

## 2025-01-15 ENCOUNTER — VIRTUAL VISIT (OUTPATIENT)
Dept: OBGYN | Facility: CLINIC | Age: 35
End: 2025-01-15
Payer: COMMERCIAL

## 2025-01-15 VITALS — HEIGHT: 57 IN | WEIGHT: 120 LBS | BODY MASS INDEX: 25.89 KG/M2

## 2025-01-15 DIAGNOSIS — Z34.81 ENCOUNTER FOR SUPERVISION OF OTHER NORMAL PREGNANCY, FIRST TRIMESTER: Primary | ICD-10-CM

## 2025-01-15 DIAGNOSIS — Z34.80 PRENATAL CARE, SUBSEQUENT PREGNANCY, UNSPECIFIED TRIMESTER: Primary | ICD-10-CM

## 2025-01-15 PROBLEM — Z23 NEED FOR TDAP VACCINATION: Status: RESOLVED | Noted: 2022-12-07 | Resolved: 2025-01-15

## 2025-01-15 PROCEDURE — 99207 PR NO CHARGE NURSE ONLY: CPT | Mod: 93

## 2025-01-15 RX ORDER — PRENATAL VIT NO.130/IRON/FOLIC 27MG-0.8MG
1 TABLET ORAL DAILY
Qty: 90 TABLET | Refills: 3 | Status: SHIPPED | OUTPATIENT
Start: 2025-01-15

## 2025-01-15 NOTE — PROGRESS NOTES
Important Information for Provider: hx of 38w IUFD, hx of gestational diabetes, message sent to care team for rx for prenatal vitamins and iron per patients request.     Telephone visit with patient for New Prenatal Intake and Education. This is patient's 4th pregnancy. Handouts reviewed and will be provided at next prenatal appointment. Scheduled for New Prenatal with Cornel on 2/11/25.     Any history of sexually transmitted infection, like chlamydia, gonorrhea, genital herpes, or other? no     Prenatal OB Questionnaire  Patient supplied answers from flow sheet for:  Prenatal OB Questionnaire.  Past Medical History  Have you ever recieved care for your mental health? : (!) Yes (Past- Medication)  Have you ever been in a major accident or suffered serious trauma?: (!) Yes (Loss of IUFD (first pregnancy)  Within the last year, has anyone hit, slapped, kicked or otherwise hurt you?: No  In the last year, has anyone forced you to have sex when you didn't want to?: No    Past Medical History 2   Have you ever received a blood transfusion?: No  Would you accept a blood transfusion if was medically recommended?: Yes  Does anyone in your home smoke?: No   Is your blood type Rh negative?: No  Have you ever ?: (!) Yes  Have you been hospitalized for a nonsurgical reason excluding normal delivery?: No  Have you ever had an abnormal pap smear?: (!) Yes (8/2023 ASCUS)    Past Medical History (Continued)  Do you have a history of abnormalities of the uterus?: No  Did your mother take GIOVANNI or any other hormones when she was pregnant with you?: No  Do you have any other problems we have not asked about which you feel may be important to this pregnancy?: No     Allergies as of 1/15/2025:    Allergies as of 01/15/2025    (No Known Allergies)        Current medications are:  Current Outpatient Medications   Medication Sig Dispense Refill    acetaminophen (TYLENOL) 325 MG tablet Take 2 tablets (650 mg) by mouth every 4 hours  as needed for mild pain (Patient not taking: Reported on 1/15/2025) 60 tablet 0    doxylamine (UNISOM) 25 MG TABS tablet Take 0.5 tablets (12.5 mg) by mouth At Bedtime (Patient not taking: Reported on 1/15/2025) 30 tablet 4    ferrous gluconate (FERGON) 324 (38 Fe) MG tablet Take 1 tablet (324 mg) by mouth daily (with breakfast) (Patient not taking: Reported on 1/15/2025) 60 tablet 3    OLANZapine (ZYPREXA) 2.5 MG tablet Take 1 tablet (2.5 mg) by mouth At Bedtime (Patient not taking: Reported on 1/15/2025) 30 tablet 0    Prenatal Vit-DSS-Fe Cbn-FA (PRENATAL AD PO)  (Patient not taking: Reported on 1/15/2025)      senna-docusate (SENOKOT-S/PERICOLACE) 8.6-50 MG tablet Take 1 tablet by mouth 2 times daily as needed for constipation (Patient not taking: Reported on 1/15/2025) 60 tablet 0    vitamin B6 (PYRIDOXINE) 100 MG tablet Take 1 tablet (100 mg) by mouth 3 times daily (Patient not taking: Reported on 1/15/2025) 90 tablet 4        Luna Courtney LPN on 1/15/2025 at 1:14 PM

## 2025-01-15 NOTE — CONFIDENTIAL NOTE
Ema would like a prescription for prenatals and iron sent to her pharmacy ([facility] in [place]). She is estimated to be 13 weeks 1 day today and is currently not taking a prenatal vitamin. She comes to see Maribel Unger on 2/11/25, she declines a sooner appointment.     Ema will be on the look out for a reply.     Luna Courtney LPN

## 2025-01-15 NOTE — Clinical Note
Ema would like a prescription for prenatals and iron sent to her pharmacy ([facility] in [place]). She is estimated to be 13 weeks 1 day today and is currently not taking a prenatal vitamin. She comes to see Maribel Unger on 2/11/25, she declines a sooner appointment.   Ema will be on the look out for a reply.   Luna Courtney LPN

## 2025-01-15 NOTE — PATIENT INSTRUCTIONS
Learning About Pregnancy  Your Care Instructions     Your health in the early weeks of your pregnancy is particularly important for your baby's health. Take good care of yourself. Anything you do that harms your body can also harm your baby.  Make sure to go to all of your doctor appointments. Regular checkups will help keep you and your baby healthy.  How can you care for yourself at home?  Diet    Choose healthy foods like fruits, vegetables, whole grains, lean proteins, and healthy fats.     Choose foods that are good sources of calcium, iron, and folate. You can try dairy products, dark leafy greens, fortified orange juice and cereals, almonds, broccoli, dried fruit, and beans.     Do not skip meals or go for many hours without eating. If you are nauseated, try to eat a small, healthy snack every 2 to 3 hours.     Avoid fish that are high in mercury. These include shark, swordfish, monroe mackerel, marlin, orange roughy, and bigeye tuna, as well as tilefish from the [facility].     It's okay to eat up to 8 to 12 ounces a week of fish that are low in mercury or up to 4 ounces a week of fish that have medium levels of mercury. Some fish that are low in mercury are salmon, shrimp, canned light tuna, cod, and tilapia. Some fish that have medium levels of mercury are halibut and white albacore tuna.     Drink plenty of fluids. If you have kidney, heart, or liver disease and have to limit fluids, talk with your doctor before you increase the amount of fluids you drink.     Limit caffeine to about 200 to 300 mg per day. On average, a cup of brewed coffee has around 80 to 100 mg of caffeine.     Do not drink alcohol, such as beer, wine, or hard liquor.     Take a multivitamin that contains at least 400 micrograms (mcg) of folic acid to help prevent birth defects. Fortified cereal and whole wheat bread are good additional sources of folic acid.     Increase the calcium in your diet. Try to drink a quart of skim milk  each day. You may also take calcium supplements and choose foods such as cheese and yogurt.   Lifestyle    Make sure you go to your follow-up appointments.     Get plenty of rest. You may be unusually tired while you are pregnant.     Get at least 30 minutes of exercise on most days of the week. Walking is a good choice. If you have not exercised in the past, start out slowly. Take several short walks each day.     Do not smoke. If you need help quitting, talk to your doctor about stop-smoking programs. These can increase your chances of quitting for good.     Do not touch cat feces or litter boxes. Also, wash your hands after you handle raw meat, and fully cook all meat before you eat it. Wear gloves when you work in the yard or garden, and wash your hands well when you are done. Cat feces, raw or undercooked meat, and contaminated dirt can cause an infection that may harm your baby or lead to a miscarriage.     Avoid things that can make your body too hot and may be harmful to your baby, such as a hot tub or sauna. Or talk with your doctor before doing anything that raises your body temperature. Your doctor can tell you if it's safe.     Avoid chemical fumes, paint fumes, or poisons.     Do not use illegal drugs, marijuana, or alcohol.   Medicines    Review all of your medicines with your doctor. Some of your routine medicines may need to be changed to protect your baby.     Use acetaminophen (Tylenol) to relieve minor problems, such as a mild headache or backache or a mild fever with cold symptoms. Do not use nonsteroidal anti-inflammatory drugs (NSAIDs), such as ibuprofen (Advil, Motrin) or naproxen (Aleve), unless your doctor says it is okay.     Do not take two or more pain medicines at the same time unless the doctor told you to. Many pain medicines have acetaminophen, which is Tylenol. Too much acetaminophen (Tylenol) can be harmful.     Take your medicines exactly as prescribed. Call your doctor if you  "think you are having a problem with your medicine.   To manage morning sickness    Keep food in your stomach, but not too much at once. Try eating five or six small meals a day instead of three large meals.     For nausea when you wake up, eat a small snack, such as a couple of crackers or pretzels, before rising. Allow a few minutes for your stomach to settle before you slowly get up.     Try to avoid smells and foods that make you feel nauseated. High-fat or greasy foods, milk, and coffee may make nausea worse. Some foods that may be easier to tolerate include cold, spicy, sour, and salty foods.     Drink enough fluids. Water and other caffeine-free drinks are good choices.     Take your prenatal vitamins at night on a full stomach.     Try foods and drinks made with vikki. Vikki may help with nausea.     Get lots of rest. Morning sickness may be worse when you are tired.     Talk to your doctor about over-the-counter products, such as vitamin B6 or doxylamine, to help relieve symptoms.     Try a P6 acupressure wrist band. These anti-nausea wristbands help some people.   Follow-up care is a key part of your treatment and safety. Be sure to make and go to all appointments, and call your doctor if you are having problems. It's also a good idea to know your test results and keep a list of the medicines you take.  Where can you learn more?  Go to https://www.One Season.net/patiented  Enter E868 in the search box to learn more about \"Learning About Pregnancy.\"  Current as of: April 30, 2024  Content Version: 14.3    2024 [employer].   Care instructions adapted under license by your healthcare professional. If you have questions about a medical condition or this instruction, always ask your healthcare professional. [employer] disclaims any warranty or liability for your use of this information.    Weeks 6 to 10 of Your Pregnancy: Care Instructions  During these weeks of pregnancy, your body " "goes through many changes. You may start to feel different, both in your body and your emotions. Each pregnancy is different, so there's no \"right\" way to feel. These early weeks are a time to make healthy choices for you and your pregnancy.    Take a daily prenatal vitamin. Choose one with folic acid in it.   Avoid alcohol, tobacco, and drugs (including marijuana). If you need help quitting, talk to your doctor.     Drink plenty of liquids.  Be sure to drink enough water. And limit sodas, other sweetened drinks, and caffeine.     Choose foods that are good sources of calcium, iron, and folate.  You can try dairy products, dark leafy greens, fortified orange juice and cereals, almonds, broccoli, dried fruit, and beans.     Avoid foods that may be harmful.  Don't eat raw meat, deli meat, raw seafood, or raw eggs. Avoid soft cheese and unpasteurized dairy, like Brie and blue cheese. And don't eat fish that contains a lot of mercury, like shark and swordfish.     Don't touch kori litter or cat poop.  They can cause an infection that could be harmful during pregnancy.     Avoid things that can make your body too hot.  For example, avoid hot tubs and saunas.     Soothe morning sickness.  Try eating 5 or 6 small meals a day, getting some fresh air, or using jose manuel to control symptoms.     Ask your doctor about flu and COVID-19 shots.  Getting them can help protect against infection.   Follow-up care is a key part of your treatment and safety. Be sure to make and go to all appointments, and call your doctor if you are having problems. It's also a good idea to know your test results and keep a list of the medicines you take.  Where can you learn more?  Go to https://www.eASIC.net/patiented  Enter G112 in the search box to learn more about \"Weeks 6 to 10 of Your Pregnancy: Care Instructions.\"  Current as of: April 30, 2024  Content Version: 14.3    2024 [facility].   Care instructions adapted under license " by your healthcare professional. If you have questions about a medical condition or this instruction, always ask your healthcare professional. [employer] disclaims any warranty or liability for your use of this information.       Pregnancy: Managing Morning Sickness (01:48)  Your health professional recommends that you watch this short online health video.  Learn how to manage morning sickness during pregnancy.   Purpose: Learn how to manage morning sickness during pregnancy.  Goal: Learn how to manage morning sickness during pregnancy.    Watch: Scan the QR code or visit the link to view video       https://hwi.se/kb/[email or web address]  Current as of: April 30, 2024  Content Version: 14.3    2024 [employer].   Care instructions adapted under license by your healthcare professional. If you have questions about a medical condition or this instruction, always ask your healthcare professional. [employer] disclaims any warranty or liability for your use of this information.    Pregnancy and Heartburn: Care Instructions  Overview     Heartburn is a common problem during pregnancy.  Heartburn happens when stomach acid backs up into the tube that carries food to the stomach. This tube is called the esophagus. Early in pregnancy, heartburn is caused by hormone changes that slow down digestion. Later on, it's also caused by the large uterus pushing up on the stomach.  Even though you can't fix the cause, there are things you can do to get relief. Treating heartburn during pregnancy focuses first on making lifestyle changes, like changing what and how you eat, and on taking medicines.  Heartburn usually improves or goes away after childbirth.  Follow-up care is a key part of your treatment and safety. Be sure to make and go to all appointments, and call your doctor if you are having problems. It's also a good idea to know your test results and keep a list of the medicines you take.  How can you  "care for yourself at home?  Eat small, frequent meals.  Avoid foods that make your symptoms worse, such as chocolate, peppermint, and spicy foods. Avoid drinks with caffeine, such as coffee, tea, and sodas.  Avoid bending over or lying down after meals.  Take a short walk after you eat.  If heartburn is a problem at night, do not eat for 2 hours before bedtime.  Take antacids like Mylanta, Maalox, Rolaids, or Tums. Do not take antacids that have sodium bicarbonate, magnesium trisilicate, or aspirin. Be careful when you take over-the-counter antacid medicines. Many of these medicines have aspirin in them. While you are pregnant, do not take aspirin or medicines that contain aspirin unless your doctor says it is okay.  If you're not getting relief, talk to your doctor. You may be able to take a stronger acid-reducing medicine.  When should you call for help?   Call your doctor now or seek immediate medical care if:    You have new or worse belly pain.     You are vomiting.   Watch closely for changes in your health, and be sure to contact your doctor if:    You have new or worse symptoms of reflux.     You are losing weight.     You have trouble or pain swallowing.     You do not get better as expected.   Where can you learn more?  Go to https://www.Machinima.net/patiented  Enter U946 in the search box to learn more about \"Pregnancy and Heartburn: Care Instructions.\"  Current as of: April 30, 2024  Content Version: 14.3    2024 [employer].   Care instructions adapted under license by your healthcare professional. If you have questions about a medical condition or this instruction, always ask your healthcare professional. [employer] disclaims any warranty or liability for your use of this information.    Constipation: Care Instructions  Overview     Constipation means that you have a hard time passing stools (bowel movements). People pass stools from 3 times a day to once every 3 days. What is " normal for you may be different. Constipation may occur with pain in the rectum and cramping. The pain may get worse when you try to pass stools. Sometimes there are small amounts of bright red blood on toilet paper or the surface of stools. This is because of enlarged veins near the rectum (hemorrhoids).  A few changes in your diet and lifestyle may help you avoid ongoing constipation. Your doctor may also prescribe medicine to help loosen your stool.  Some medicines can cause constipation. These include pain medicines and antidepressants. Tell your doctor about all the medicines you take. Your doctor may want to make a medicine change to ease your symptoms.  Follow-up care is a key part of your treatment and safety. Be sure to make and go to all appointments, and call your doctor if you are having problems. It's also a good idea to know your test results and keep a list of the medicines you take.  How can you care for yourself at home?  Drink plenty of fluids. If you have kidney, heart, or liver disease and have to limit fluids, talk with your doctor before you increase the amount of fluids you drink.  Include high-fiber foods in your diet each day. These include fruits, vegetables, beans, and whole grains.  Get at least 30 minutes of exercise on most days of the week. Walking is a good choice. You also may want to do other activities, such as running, swimming, cycling, or playing tennis or team sports.  Take a fiber supplement, such as Citrucel or Metamucil, every day. Read and follow all instructions on the label.  Schedule time each day for a bowel movement. A daily routine may help. Take your time having a bowel movement, but don't sit for more than 10 minutes at a time. And don't strain too much.  Support your feet with a small step stool when you sit on the toilet. This helps flex your hips and places your pelvis in a squatting position.  Your doctor may recommend an over-the-counter laxative to relieve  "your constipation. Examples are Milk of Magnesia and MiraLax. Read and follow all instructions on the label. Do not use laxatives on a long-term basis.  When should you call for help?   Call your doctor now or seek immediate medical care if:    You have new or worse belly pain.     You have new or worse nausea or vomiting.     You have blood in your stools.   Watch closely for changes in your health, and be sure to contact your doctor if:    Your constipation is getting worse.     You do not get better as expected.   Where can you learn more?  Go to https://www.[employer].net/patiented  Enter P343 in the search box to learn more about \"Constipation: Care Instructions.\"  Current as of: October 19, 2023  Content Version: 14.3    2024 [employer].   Care instructions adapted under license by your healthcare professional. If you have questions about a medical condition or this instruction, always ask your healthcare professional. [employer] disclaims any warranty or liability for your use of this information.    Learning About High-Iron Foods  What foods are high in iron?     The foods you eat contain nutrients, such as vitamins and minerals. Iron is a nutrient. Your body needs the right amount to stay healthy and work as it should. You can use the list below to help you make choices about which foods to eat.  Here are some foods that contain iron. They have 1 to 2 milligrams of iron per serving.  Fruits  Figs (dried), 5 figs  Vegetables  Asparagus (canned), 6 munoz  Rosy, beet, Swiss chard, or turnip greens, 1 cup  Dried peas, cooked,   cup  Seaweed, spirulina (dried),   cup  Spinach, (cooked)   cup or (raw) 1 cup  Grains  Cereals, fortified with iron, 1 cup  Grits (instant, cooked), fortified with iron,   cup  Meats and other protein foods  Beans (kidney, lima, navy, white), canned or cooked,   cup  Beef or lamb, 3 oz  Chicken giblets, 3 oz  Chickpeas (garbanzo beans),   cup  Liver of beef, " "lamb, or pork, 3 oz  Oysters (cooked), 3 oz  Sardines (canned), 3 oz  Soybeans (boiled),   cup  Tofu (firm),   cup  Work with your doctor to find out how much of this nutrient you need. Depending on your health, you may need more or less of it in your diet.  Where can you learn more?  Go to https://www.Cellca.net/patiented  Enter R005 in the search box to learn more about \"Learning About High-Iron Foods.\"  Current as of: September 20, 2023  Content Version: 14.3    2024 [employer].   Care instructions adapted under license by your healthcare professional. If you have questions about a medical condition or this instruction, always ask your healthcare professional. [employer] disclaims any warranty or liability for your use of this information.    Rh Antibodies Screening During Pregnancy: About This Test  What is it?     The Rh antibodies screening test is a blood test. It checks your blood for Rh antibodies. If you have Rh-negative blood and have been exposed to Rh-positive blood, your immune system may make antibodies to attack the Rh-positive blood. When a pregnant woman has these antibodies, it is called Rh sensitization.  Why is this test done?  The Rh antibodies screening test is done during pregnancy to find out if your baby is at risk for Rh disease. This can happen if you have Rh-negative blood and your baby has Rh-positive blood. If your Rh-negative blood mixes with Rh-positive blood, your immune system will make antibodies to attack the Rh-positive blood.  During pregnancy, these antibodies could attach to the baby's red blood cells. This can cause your baby to have serious health problems. The results of this test will help your doctor know how to best care for you and your baby during your pregnancy.  How do you prepare for the test?  In general, there's nothing you have to do before this test, unless your doctor tells you to.  How is the test done?  A health professional uses " "a needle to take a blood sample, usually from the arm.  What happens after the test?  You will probably be able to go home right away. It depends on the reason for the test.  You can go back to your usual activities right away.  Follow-up care is a key part of your treatment and safety. Be sure to make and go to all appointments, and call your doctor if you are having problems. It's also a good idea to keep a list of the medicines you take. Ask your doctor when you can expect to have your test results.  Where can you learn more?  Go to https://www.etouches.Civicon/patiented  Enter P722 in the search box to learn more about \"Rh Antibodies Screening During Pregnancy: About This Test.\"  Current as of: 2024  Content Version: 14.3    2024 [employer].   Care instructions adapted under license by your healthcare professional. If you have questions about a medical condition or this instruction, always ask your healthcare professional. [employer] disclaims any warranty or liability for your use of this information.    Learning About Preventing Rh Disease  What is Rh disease?     Rh disease can be a serious problem in pregnancy. It happens when substances called antibodies in the mother's blood cause red blood cells in her baby's blood to be destroyed. This can occur when the blood types of a mother and her baby do not match.  All blood has an Rh factor. This is what makes a blood type positive or negative. When you are Rh-negative, your baby may be Rh-negative or Rh-positive. If your baby has Rh-positive blood and it mixes with yours, your body will make antibodies. This is called Rh sensitization.  Most of the time, this is not a problem in a first pregnancy. But in future pregnancies, it could cause Rh disease.  A  with Rh disease has mild anemia and may have jaundice. In severe cases, anemia, jaundice, and swelling can be very dangerous or fatal. Some babies need to be delivered " "early. Some need special care in the NICU. A very sick baby will need a blood transfusion before or after birth.  Fortunately, Rh sensitization is usually easy to prevent.  That's why it's important to get your Rh status checked in your first trimester. It doesn't cause any warning signs. A blood test is the only way to know if you are Rh-sensitive or are at risk for it.  How can you prevent Rh disease?  If you are Rh-negative, your doctor gives you an Rh immune globulin shot (such as RhoGAM). It helps prevent your body from making the antibodies that attack your baby's red blood cells.  Timing is important. You need the shot at certain times during your pregnancy. And you need one anytime there is a chance that your baby's blood might mix with yours. That can happen with certain prenatal tests or when you have pregnancy bleeding, such as:  Right after any pregnancy loss, amniocentesis, or CVS testing.  After turning of a breech baby.  Before and maybe after childbirth. Your doctor gives you a shot around week 28. If your  is Rh-positive, you will have another shot.  Follow-up care is a key part of your treatment and safety. Be sure to make and go to all appointments, and call your doctor if you are having problems. It's also a good idea to know your test results and keep a list of the medicines you take.  Where can you learn more?  Go to https://www.BlaBlaCar.net/patiented  Enter W177 in the search box to learn more about \"Learning About Preventing Rh Disease.\"  Current as of: 2024  Content Version: 14.3    2024 [employer].   Care instructions adapted under license by your healthcare professional. If you have questions about a medical condition or this instruction, always ask your healthcare professional. [employer] disclaims any warranty or liability for your use of this information.    Learning About Rh Immunoglobulin Shots  Introduction     An Rh immunoglobulin shot is " given to pregnant women who have Rh-negative blood.  You may have Rh-negative blood, and your baby may have Rh-positive blood. If the two types of blood mix, your body will make antibodies. This is called Rh sensitization. Most of the time, this is not a problem the first time you're pregnant. But it could cause problems in future pregnancies.  This shot keeps your body from making the antibodies. You get the shot around 28 weeks of pregnancy. After the birth, your baby's blood is tested. If the blood is Rh positive, you will get another shot. You may also get the shot if you have vaginal bleeding while you are pregnant or if you have a miscarriage. These shots protect future pregnancies.  Women with Rh negative blood will need this shot each time they get pregnant.  Example  Rh immunoglobulin (HypRho-D, MICRhoGAM, and RhoGAM)  Possible side effects  Rare side effects may include:  Some mild pain where you got the shot.  A slight fever.  An allergic reaction.  You may have other side effects not listed here. Check the information that comes with your medicine.  What to know about taking this medicine  You may need more than one shot. You may need the shot again:  After amniocentesis, fetal blood sampling, or chorionic villus sampling tests.  If you have bleeding in your second or third trimester.  After turning of a breech baby.  After an injury to the belly while you are pregnant.  After a miscarriage or an .  Before or right after treatment for an ectopic or a partial molar pregnancy.  Tell your doctor if you have any allergies or have had a bad response to medicines in the past.  If you get this shot within 3 months of getting a live-virus vaccine, the vaccine may not work. Your doctor will tell you if you need more vaccine.  Check with your doctor or pharmacist before you use any other medicines. This includes over-the-counter medicines. Make sure your doctor knows all of the medicines, vitamins, herbs,  "and supplements you take. Taking some medicines at the same time can cause problems.  Where can you learn more?  Go to https://www.HaloSource.net/patiented  Enter V615 in the search box to learn more about \"Learning About Rh Immunoglobulin Shots.\"  Current as of: April 30, 2024  Content Version: 14.3    2024 [employer].   Care instructions adapted under license by your healthcare professional. If you have questions about a medical condition or this instruction, always ask your healthcare professional. [employer] disclaims any warranty or liability for your use of this information.    Rubella (Cymro Measles): Care Instructions  Overview  Rubella, also called Cymro measles or 3-day measles, is a disease caused by a virus. It spreads by coughs, sneezes, and close contact. Rubella usually is mild and does not cause long-term problems. But if you are pregnant and get it, you can give the disease to your unborn baby. This can cause serious birth defects.  While you have rubella, you may get a rash and a mild fever, and the lymph glands in your neck may swell. Older children often have a fever, eye pain, a sore throat, and body aches. You can relieve most symptoms with care at home. Avoid being around others, especially pregnant people, until your rash has been gone for at least 4 days. People who have not had this disease before or have not had the vaccine have the greatest chance of getting the virus.  Follow-up care is a key part of your treatment and safety. Be sure to make and go to all appointments, and call your doctor if you are having problems. It's also a good idea to know your test results and keep a list of the medicines you take.  How can you care for yourself at home?  Drink plenty of fluids. If you have kidney, heart, or liver disease and have to limit fluids, talk with your doctor before you increase the amount of fluids you drink.  Get plenty of rest to help your body heal.  Take " "an over-the-counter pain medicine, such as acetaminophen (Tylenol), ibuprofen (Advil, Motrin), or naproxen (Aleve), to reduce fever and discomfort. Read and follow all instructions on the label. Do not give aspirin to anyone younger than 20. It has been linked to Reye syndrome, a serious illness.  Do not take two or more pain medicines at the same time unless the doctor told you to. Many pain medicines have acetaminophen, which is Tylenol. Too much acetaminophen (Tylenol) can be harmful.  Try not to scratch the rash. Put cold, wet cloths on the rash to reduce itching.  Do not smoke. Smoking can make your symptoms worse. If you need help quitting, talk to your doctor about stop-smoking programs and medicines. These can increase your chances of quitting for good.  Avoid contact with people who have never had rubella and who have not been immunized.  When should you call for help?   Call your doctor now or seek immediate medical care if:    You have a fever with a stiff neck or a severe headache.     You are sensitive to light or feel very sleepy or confused.   Watch closely for changes in your health, and be sure to contact your doctor if:    You do not get better as expected.   Where can you learn more?  Go to https://www.Vook.net/patiented  Enter B812 in the search box to learn more about \"Rubella (Swedish Measles): Care Instructions.\"  Current as of: April 30, 2024  Content Version: 14.3    2024 [employer].   Care instructions adapted under license by your healthcare professional. If you have questions about a medical condition or this instruction, always ask your healthcare professional. [employer] disclaims any warranty or liability for your use of this information.    Gonorrhea and Chlamydia: About These Tests  What is it?  These tests use a sample of urine or other body fluid to look for the bacteria that cause these sexually transmitted infections (STIs). The fluid sample can come " "from the cervix, vagina, rectum, throat, or eyes.  Why is this test done?  These tests may be done to:  Find out if symptoms are caused by gonorrhea or chlamydia.  Check people who are at high risk of being infected with gonorrhea or chlamydia.  Retest people several months after they have been treated for gonorrhea or chlamydia.  Check for infection in your  if you had a gonorrhea or chlamydia infection at the time of delivery.  How can you prepare for the test?  If you are going to have a urine test, do not urinate for at least 1 hour before the test.  If you think you may have chlamydia or gonorrhea, don't have sexual intercourse until you get your test results. And you may want to have tests for other STIs, such as HIV.  How is the test done?  For a direct sample, a swab is used to collect body fluid from the cervix, vagina, rectum, throat, or eyes. Your doctor may collect the sample. Or you may be given instructions on how to collect your own sample.  For a urine sample, you will collect the urine that comes out when you first start to urinate. Don't wipe the genital area clean before you urinate.  How long does the test take?  The test will take a few minutes.  What happens after the test?  You will be able to go home right away.  You can go back to your usual activities right away.  If you do have an infection, don't have sexual intercourse for 7 days after you start treatment. And your sex partner(s) should also be treated.  Follow-up care is a key part of your treatment and safety. Be sure to make and go to all appointments, and call your doctor if you are having problems. It's also a good idea to keep a list of the medicines you take. Ask your doctor when you can expect to have your test results.  Where can you learn more?  Go to https://www.healthwise.net/patiented  Enter K976 in the search box to learn more about \"Gonorrhea and Chlamydia: About These Tests.\"  Current as of: 2024  Content " Version: 14.3    2024 [employer].   Care instructions adapted under license by your healthcare professional. If you have questions about a medical condition or this instruction, always ask your healthcare professional. [employer] disclaims any warranty or liability for your use of this information.    Trichomoniasis: About This Test  What is it?     This test uses a sample of urine or other body fluid to look for the tiny parasite that causes trichomoniasis (also called trich). The fluid sample can come from the vagina, cervix, or urethra. Your doctor may choose to use one or more of many available tests.  Why is it done?  A trich test may be done to:  Find out if symptoms are caused by trich.  Check people who are at high risk for being infected with trich.  Check after treatment to make sure that the infection is gone.  How do you prepare for the test?  If you are going to have a urine test, do not urinate for at least 1 hour before the test.  How is the test done?  For a direct sample, a swab is used to collect body fluid from the cervix, vagina, or urethra. Your doctor may collect the sample. Or you may be given instructions on how to collect your own sample.  For a urine sample, you will collect the urine that comes out when you first start to urinate. Don't wipe the area clean before you urinate.  How long does the test take?  It will take a few minutes to collect a sample.  What happens after the test?  You can go home right away.  You can go back to your usual activities right away.  You may get the test results the same day or several days later. It depends on the test used.  If you do have an infection, don't have sexual intercourse for 7 days after you start treatment. Your sex partner or partners should also be treated.  Follow-up care is a key part of your treatment and safety. Be sure to make and go to all appointments, and call your doctor if you are having problems. Ask your  "doctor when you can expect to have your test results.  Current as of: April 30, 2024  Content Version: 14.3    2024 [employer].   Care instructions adapted under license by your healthcare professional. If you have questions about a medical condition or this instruction, always ask your healthcare professional. [employer] disclaims any warranty or liability for your use of this information.    HIV Testing: Care Instructions  Overview  You can get tested for the human immunodeficiency virus (HIV). Most doctors use a blood test to check for HIV antibodies and antigens in your blood. It may also check for the genetic material (RNA) of HIV. Some tests use saliva to check for HIV antibodies. But these aren't as accurate. For example, they may give a false result if you've just been infected.  What do the results mean?        Normal (negative)   No HIV antibodies, antigens, or RNA were found.  You may need more testing. It can make sure your test results are correct.        Uncertain (indeterminate)   Test results didn't clearly show if you have an HIV infection.  HIV antibodies or antigens may not have formed yet.  Some other type of antibody or antigen may have affected the results.  You will need another test to be sure.        Abnormal (positive)   HIV antibodies, antigens, or RNA were found.  If you haven't had an RNA test yet, one will be done. If it's positive, you have HIV.  If your test result is positive, your doctor will talk to you. You will discuss starting treatment.  Follow-up care is a key part of your treatment and safety. Be sure to make and go to all appointments, and call your doctor if you are having problems. It's also a good idea to know your test results and keep a list of the medicines you take.  Where can you learn more?  Go to https://www.healthMarketBridge.net/patiented  Enter T792 in the search box to learn more about \"HIV Testing: Care Instructions.\"  Current as of: April 30, " 2024  Content Version: 14.3    2024 [employer].   Care instructions adapted under license by your healthcare professional. If you have questions about a medical condition or this instruction, always ask your healthcare professional. [employer] disclaims any warranty or liability for your use of this information.    Hepatitis C Virus Tests: About These Tests  What are they?     Hepatitis C virus tests are blood tests that check for substances in the blood that show whether you have hepatitis C now or had it in the past. The tests can also tell you what type of hepatitis C you have and how severe the disease is. This can help your doctor with treatment.  If the tests show that you have long-term hepatitis C, you need to take steps to prevent spreading the disease.  Why are these tests done?  You may need these tests if:  You have symptoms of hepatitis.  You may have been exposed to the virus. You are more likely to have been exposed to the virus if you inject drugs or are exposed to body fluids (such as if you are a health care worker).  You've had other tests that show you have liver problems.  You are 18 to 79 years old.  You have an HIV infection.  The tests also are done to help your doctor decide about your treatment and see how well it works.  How do you prepare for the test?  In general, there's nothing you have to do before this test, unless your doctor tells you to.  How is the test done?  A health professional uses a needle to take a blood sample, usually from the arm.  What happens after these tests?  You will probably be able to go home right away.  You can go back to your usual activities right away.  Follow-up care is a key part of your treatment and safety. Be sure to make and go to all appointments, and call your doctor if you are having problems. It's also a good idea to keep a list of the medicines you take. Ask your doctor when you can expect to have your test results.  Where  "can you learn more?  Go to https://www.uBank.net/patiented  Enter W551 in the search box to learn more about \"Hepatitis C Virus Tests: About These Tests.\"  Current as of: April 30, 2024  Content Version: 14.3    2024 [employer].   Care instructions adapted under license by your healthcare professional. If you have questions about a medical condition or this instruction, always ask your healthcare professional. [employer] disclaims any warranty or liability for your use of this information.    Learning About Fetal Ultrasound Results  What is a fetal ultrasound?     Fetal ultrasound is a test that lets your doctor see an image of your baby. Your doctor learns information about your baby from this picture. You may find out, for example, if you are having a boy or a girl. But the main reason you have this test is to get information about your baby's health.  (You may hear your baby called a fetus. This is a common medical term for a baby that's growing in the mother's uterus.)  What kind of information can you learn from this test?  The findings of an ultrasound fall into two categories, normal and abnormal.  Normal  The fetus is the right size for its age.  The placenta is the expected size and does not cover the cervix.  There is enough amniotic fluid in the uterus.  No birth defects can be seen.  Abnormal  The fetus is small or large for its age.  The placenta covers the cervix.  There is too much or too little amniotic fluid in the uterus.  The fetus may have a birth defect.  What does an abnormal result mean?  Abnormal seems to imply that something is wrong with your baby. But what it means is that the test has shown something the doctor wants to take a closer look at.  And that's what happens next. Your doctor will talk to you about what further test or tests you may need.  What do the results mean?  Some of the things your doctor may see on an abnormal ultrasound include:  Echogenic " bowel.  The bowel looks very bright on the screen. This could mean that there's blood in the bowel. Or it could mean that something is blocking the small bowel.  Increased nuchal translucency.  The ultrasound measures the thickness at the back of the baby's neck. An increase in thickness is sometimes an early sign of Down syndrome.  Increased or decreased amniotic fluid.  The doctor will look for a reason for the level of amniotic fluid and will watch the pregnancy closely as it progresses.  Large ventricles.  Ventricles in the brain look larger than they should. Your doctor may take a closer look at the brain.  Renal pyelectasis/hydronephrosis.  The ultrasound measures the fluid around the kidney. If there is more fluid than expected, there is a chance of urinary tract or kidney problems.  Short long bones.  The ultrasound measures certain arm and leg bones. A long bone (humerus or femur) that is shorter than average could be a sign of Down syndrome.  Subchorionic hemorrhage.  An ultrasound can show bleeding under one of the membranes that surrounds the fetus. Some women don't have symptoms of bleeding. The ultrasound can find this problem when women are not bleeding from their vagina. Women who have this condition have a slightly higher chance of miscarriage.  What do you do now?  Take a deep breath, and let it out. Keep in mind that an abnormal finding on an ultrasound, after it's coupled with more information, may:  Turn out to be nothing.  Turn out to be something mild that won't affect the baby.  Turn out to be something more serious. But if this happens, early diagnosis helps you and your doctor plan treatment options sooner rather than later.  Your medical team is there for you. So are your family and friends. Ask questions, and get the help and support you need.  Follow-up care is a key part of your treatment and safety. Be sure to make and go to all appointments, and call your doctor if you are having  "problems. It's also a good idea to know your test results and keep a list of the medicines you take.  Where can you learn more?  Go to https://www.[employer]Connect.net/patiented  Enter K451 in the search box to learn more about \"Learning About Fetal Ultrasound Results.\"  Current as of: April 30, 2024  Content Version: 14.3    2024 [employer].   Care instructions adapted under license by your healthcare professional. If you have questions about a medical condition or this instruction, always ask your healthcare professional. [employer] disclaims any warranty or liability for your use of this information.    Learning About Prenatal Visits  Overview     Regular prenatal visits are very important during any pregnancy. These quick office visits may seem simple and routine. But they can help you have a safe and healthy pregnancy. Your doctor is watching for problems that can only be found through regular checkups. The visits also give you and your doctor time to build a good relationship.  After your first visit, you will most likely start on a schedule of monthly visits. In your third trimester, the visits will get more frequent. Based on your health, your age, and if you've had a normal, full-term pregnancy before, your doctor may want to see you more or less often.  At different times in your pregnancy, you will have exams and tests. Some are routine. Others are done only when there is a chance of a problem. Everything healthy you do for your body helps you have a healthy pregnancy. Rest when you need it. Eat well, drink plenty of water, and exercise regularly.  What happens during a prenatal visit?  You will have blood pressure checks, along with urine tests. You also may have blood tests. If you need to go to the bathroom while waiting for the doctor, tell the nurse. You will be given a sample cup so your urine can be tested.  You will be weighed and have your belly measured.  Your doctor may listen " to the fetal heartbeat with a special device.  At about 24 weeks, and possibly earlier in your pregnancy, your doctor will check your blood sugar (glucose tolerance test) for diabetes that can occur during pregnancy. This is gestational diabetes, which can be harmful.  You will have tests to check for infections that could harm your . These include group B streptococcus and hepatitis B.  Your doctor may do ultrasounds to check for problems. This also checks the position of the fetus. An ultrasound uses sound waves to produce a picture of the fetus.  You may get your vaccines updated.  Your doctor may ask you questions to check for signs of anxiety or depression. Tell your doctor if you feel sad, anxious, or hopeless for more than a few days.  You may have other tests at any time during your pregnancy.  Use your visits to discuss with your doctor any concerns you have.  How can you care for yourself at home?  Get plenty of rest.  Try to exercise every day, if your doctor says it is okay. If you have not exercised in the past, start out slowly. For example, you can take short walks each day.  Choose healthy foods, such as fruits, vegetables, whole grains, lean proteins, low-fat dairy, and healthy fats.  Drink plenty of fluids. Cut down on drinks with caffeine, such as coffee, tea, and cola. If you have kidney, heart, or liver disease and have to limit fluids, talk with your doctor before you increase the amount of fluids you drink.  Try to avoid chemical fumes, paint fumes, and poisons.  If you smoke, vape, or use alcohol, marijuana, or other drugs, quit or cut back as much as you can. Talk to your doctor if you need help quitting.  Review all of your medicines, including over-the-counter medicines and supplements, with your doctor. Some of your routine medicines may need to be changed. Do not stop or start taking any medicines without talking to your doctor first.  Follow-up care is a key part of your  "treatment and safety. Be sure to make and go to all appointments, and call your doctor if you are having problems. It's also a good idea to know your test results and keep a list of the medicines you take.  Where can you learn more?  Go to https://www.[employer].net/patiented  Enter J502 in the search box to learn more about \"Learning About Prenatal Visits.\"  Current as of: April 30, 2024  Content Version: 14.3    2024 [employer].   Care instructions adapted under license by your healthcare professional. If you have questions about a medical condition or this instruction, always ask your healthcare professional. [employer] disclaims any warranty or liability for your use of this information.    Intimate Partner Violence: Care Instructions  Overview     If you want to save this information but don't think it is safe to take it home, see if a trusted friend can keep it for you. Plan ahead. Know who you can call for help, and memorize the phone number.   Be careful online too. Your online activity may be seen by others. Do not use your personal computer or device to read about this topic. Use a safe computer, such as one at work, a friend's home, or a library.    Intimate partner violence--a type of domestic abuse--is different from an argument now and then. It is a pattern of abuse that one person may use to control another person's behavior. It may start with threats and name-calling. Then, it may lead to more serious acts, like pushing and slapping. The abuse also may occur in other areas. For example, the abuser may withhold money or spend a partner's money without their knowledge.  Abuse can cause serious harm. You are more likely to have a long-term health problem from the injuries and stress of living in a violent relationship. People who are sexually abused by their partners have more sexually transmitted infections and unplanned pregnancies. Anyone who is abused also faces emotional " "pain. Anyone can be abused in relationships. In some relationships, both people use abusive behavior.  If you are pregnant, abuse can cause problems such as poor weight gain, infections, and bleeding. Abuse during this time may increase your baby's risk of low birth weight, premature birth, and death.  Follow-up care is a key part of your treatment and safety. Be sure to make and go to all appointments, and call your doctor if you are having problems. It's also a good idea to know your test results and keep a list of the medicines you take.  How can you care for yourself at home?  If you do not have a safe place to stay, discuss this with your doctor before you leave.  Have a plan for where to go, how to leave your home, and where to stay in case of an emergency. Do not tell your partner about your plan. Contact:  The National Domestic Violence Hotline toll-free at 1-180.855.8892. They can help you find resources in your area.  Your local police department, hospital, or clinic for information about shelters and safe homes near you.  Talk to a trusted friend or neighbor, a counselor, or a geri leader. Do not feel that you have to hide what happened.  Teach your children how to call for help in an emergency.  Be alert to warning signs, such as threats, heavy alcohol use, or drug use. This can help you avoid danger.  If you can, make sure that there are no guns or other weapons in your home.  When should you call for help?   Call 911 anytime you think you may need emergency care. For example, call if:    You or someone else has just been abused.     You think you or someone else is in danger of being abused.   Watch closely for changes in your health, and be sure to contact your doctor if you have any problems.  Where can you learn more?  Go to https://www.healthwise.net/patiented  Enter G282 in the search box to learn more about \"Intimate Partner Violence: Care Instructions.\"  Current as of: July 31, 2024  Content " Version: 14.3    2024 [employer].   Care instructions adapted under license by your healthcare professional. If you have questions about a medical condition or this instruction, always ask your healthcare professional. [employer] disclaims any warranty or liability for your use of this information.    Intimate Partner Violence Safety Instructions: Care Instructions  Overview     If you want to save this information but don't think it is safe to take it home, see if a trusted friend can keep it for you. Plan ahead. Know who you can call for help, and memorize the phone number.   Be careful online too. Your online activity may be seen by others. Do not use your personal computer or device to read about this topic. Use a safe computer, such as one at work, a friend's home, or a library.    When you are abused by a spouse or partner, you can take actions to protect yourself and your children.  You can increase your safety whether you decide to stay with your spouse or partner or you decide to leave. You may want to make a safety plan and pack a bag ahead of time. This will help you leave quickly when you decide to. Remember, you cannot change your partner's actions, but you can find help for you and your children. No one deserves to be abused.  Follow-up care is a key part of your treatment and safety. Be sure to make and go to all appointments, and call your doctor if you are having problems. It's also a good idea to know your test results and keep a list of the medicines you take.  How can you care for yourself at home?  Make a plan for your safety   If you decide to stay with your abusive spouse or partner, you can do the following to increase your safety:  Decide what works best to keep you safe in an emergency.  Know who you can call to help you in an emergency.  Decide if you will call the police if you get hurt again. If you can, agree on a signal with your children or neighbor to call the  police for you if you need help. You can flash lights or hang something out of a window.  Choose a safe place to go for a short time if you need to leave home. Memorize the address and phone number.  Learn escape routes out of your home in case you need to leave in a hurry. Teach your children different ways to get out of your home quickly if they need to.  If you can, hide or lock up things that can be used as weapons (guns, knives, hammers).  Learn the number of a domestic violence shelter. Talk to the people there about how they can help.  Find out about other community resources that can help you.  Take pictures of bruises or other injuries if you can. You can also take pictures of things your abuser has broken.  Teach your children that violence is never okay. Tell them that they do not deserve to be hurt.  Pack a bag   Prepare a bag with things you will need if you leave suddenly. Leave it with a friend, a relative, or someone else you trust. You could include the following items in the bag:  A set of keys to your home and car.  Emergency phone numbers and addresses.  Money such as cash or checks. You can also ask a friend, a relative, or someone else you trust to hold money for you.  Copies of legal documents such as house and car titles or rent receipts, birth certificates, Social Security card, voter registration, marriage and 's licenses, and your children's health records.  Personal items you would need for a few days, such as clothes, a toothbrush, toothpaste, and any medicines you or your children need.  A favorite toy or book for your child or children.  Diapers and bottles, if you have very young children.  Pictures that show signs of abuse and violence. You may also add pictures of your abuser.  If you leave   If you decide to leave, you can take the following steps:  Go to the emergency room at a hospital if you have been hurt.  Think about asking the police to be with you as you leave. They  "can protect you as you leave your home.  If you decide to leave secretly, remember that activities can be tracked. Your abuser may still have access to your cell phone, email, and credit cards. It may be possible for these to be traced. Always be aware of your surroundings.  If this is an emergency, do not worry about gathering up anything. Just leave--your safety is most important.  If your abuser moves out, change the locks on the doors. If you have a security system, change the access code.  When should you call for help?   Call 911 anytime you think you may need emergency care. For example, call if:    You or someone else has just been abused.     You think you or someone else is in danger of being abused.   Watch closely for changes in your health, and be sure to contact your doctor if you have any problems.  Where can you learn more?  Go to https://www.Fitt[employer].net/patiented  Enter A752 in the search box to learn more about \"Intimate Partner Violence Safety Instructions: Care Instructions.\"  Current as of: July 31, 2024  Content Version: 14.3    2024 [employer].   Care instructions adapted under license by your healthcare professional. If you have questions about a medical condition or this instruction, always ask your healthcare professional. [employer] disclaims any warranty or liability for your use of this information.    Learning About Intimate Partner Violence  What is intimate partner violence?  Intimate partner violence is a type of domestic abuse. It's threatening, emotionally harmful, or violent behavior in a personal relationship. It can happen between past or current partners or spouses. In some relationships both people abuse each other. One partner may be more abusive. Or the abuse may be equal.  Abuse can affect people of any ethnic group, race, or Protestant. It can affect teens, adults, or the elderly. And it can happen to people of any sexual orientation, gender, or " social status.  Abusers use fear, bullying, and threats to control their partners. They may control what their partners do. They may control where their partners go or who they see. They may act jealous, controlling, or possessive. These early signs of abuse may happen soon after the start of the relationship. Sometimes it can be hard to notice abuse at first. But after the relationship becomes more serious, the abuse may get worse.  If you are being abused in your relationship, it's important to get help. The abuse is not your fault. You don't have to face it alone.  Be careful  It may not be safe to take home domestic abuse information like this handout. Some people ask a trusted friend to keep it for them. It's also important to plan ahead and to memorize the phone number of places you can go for help. If you are concerned about your safety, do not use your computer, smartphone, or tablet to read about domestic abuse.   What are the types of intimate partner violence?  Abuse can happen in different ways. Each type can happen on its own or in combination with others.  Emotional abuse  Emotional abuse is a pattern of threats, insults, or controlling behavior. It includes verbal abuse. It goes beyond healthy disagreements in a relationship. It's a sign of an unhealthy relationship.  Do you feel threatened, intimidated, or controlled?  Does your partner:  Threaten your children, other family members, or pets?  Use jokes meant to embarrass or shame you?  Call you names?  Tell you that you are a bad parent?  Threaten to take away your children?  Threaten to have you or your family members deported?  Control your access to money or other basic needs?  Control what you do, who you see or talk to, or where you go?  Another form of emotional abuse is denying that it is happening. Or the abuser may act like the abuse is no big deal or is your fault.  Sexual abuse  With sexual abuse, abusers may try to convince or force you  to have sex. They may force you into sex acts you're not comfortable with. Or they may sexually assault you. Sexual abuse can happen even if you are in a committed relationship.  Physical abuse  Physical abuse means that a partner hits, kicks, or does something else to physically hurt you. Physical abuse that starts with a slap might lead to kicking, shoving, and choking over time. The abuser may also threaten to hurt or kill you.  Stalking  Stalking means that an abuser gives you attention that you do not want and that causes you fear. Examples of stalking include:  Following you.  Showing up at places where the abuser isn't invited, such as at your work or school.  Constantly calling or texting you.  What problems can  to?  Intimate partner violence can be very dangerous. It can cause serious, repeated injury. It can even lead to death.  All forms of abuse can cause long-term health problems from the stress of a violent relationship. Verbal abuse can lead to sexual and physical abuse.  Abuse causes:  Emotional pain.  Depression.  Anxiety.  Post-traumatic stress.  Sexual abuse can lead to sexually transmitted infections (such as HIV/AIDS) and unplanned pregnancy.  Pregnancy can be a very dangerous time for people in abusive relationships. Abuse can cause or increase the risk of problems during pregnancy. These include low weight gain, anemia, infections, and bleeding. Abuse may also increase your baby's risk of low birth weight, premature birth, and death.  It can be hard for some victims of abuse to ask for help or to leave their relationship. You may feel scared, stuck, or not sure what steps to take. But it's important not to ignore abuse. Talking to someone you trust could be the first step to ending the abuse and taking care of your own health and happiness again. There are resources available that can help keep you safe.  Where can you get help?  Talk to a trusted friend. Find a local advocacy group,  "or talk to your doctor about the abuse.  Contact the National Domestic Violence Hotline at 6-069-474-SAFE (1-163.325.4545) for more safety tips. They can guide you to groups in your area that can help. Or go to the National Coalition Against Domestic Violence website at www.thehotline.org to learn more.  Domestic violence groups or a counselor in your area can help you make a safety plan for yourself and your children.  When to call for help  Call 911 anytime you think you may need emergency care. For example, call if:  You think that you or someone you know is in danger of being abused.  You have been hurt and can't have someone safely take you to emergency care.  You have just been abused.  A family member has just been abused.  Where can you learn more?  Go to https://www.[employer].Konne[employer]i.com/patiented  Enter S665 in the search box to learn more about \"Learning About Intimate Partner Violence.\"  Current as of: July 31, 2024  Content Version: 14.3    2024 [employer].   Care instructions adapted under license by your healthcare professional. If you have questions about a medical condition or this instruction, always ask your healthcare professional. [employer] disclaims any warranty or liability for your use of this information.    Vaginal Bleeding During Pregnancy: Care Instructions  Overview     It's common to have some vaginal spotting when you are pregnant. In some cases, the bleeding isn't serious. And there aren't any more problems with the pregnancy.  But sometimes bleeding is a sign of a more serious problem. This is more common if the bleeding is heavy or painful. Examples of more serious problems include miscarriage, an ectopic pregnancy, and a problem with the placenta.  You may have to see your doctor again to be sure everything is okay. You may also need more tests to find the cause of the bleeding.  Home treatment may be all you need. But it depends on what is causing the bleeding. " Be sure to tell your doctor if you have any new symptoms or if your symptoms get worse.  The doctor has checked you carefully, but problems can develop later. If you notice any problems or new symptoms, get medical treatment right away.  Follow-up care is a key part of your treatment and safety. Be sure to make and go to all appointments, and call your doctor if you are having problems. It's also a good idea to know your test results and keep a list of the medicines you take.  How can you care for yourself at home?  If your doctor prescribed medicines, take them exactly as directed. Call your doctor if you think you are having a problem with your medicine.  Do not have vaginal sex until your doctor says it's okay.  Do not put anything in your vagina until your doctor says it's okay.  Ask your doctor about other activities you can or can't do.  Get a lot of rest. Being pregnant can make you tired.  Do not use nonsteroidal anti-inflammatory drugs (NSAIDs), such as ibuprofen (Advil, Motrin), naproxen (Aleve), or aspirin, unless your doctor says it is okay.  When should you call for help?   Call 911 anytime you think you may need emergency care. For example, call if:    You passed out (lost consciousness).     You have severe vaginal bleeding. This means you are soaking through a pad each hour for 2 or more hours.     You have sudden, severe pain in your belly or pelvis.   Call your doctor now or seek immediate medical care if:    You have new or worse vaginal bleeding.     You are dizzy or lightheaded, or you feel like you may faint.     You have pain in your belly, pelvis, or lower back.     You think that you are in labor.     You have a sudden release of fluid from your vagina.     You've been having regular contractions for an hour. This means that you've had at least 8 contractions within 1 hour or at least 4 contractions within 20 minutes, even after you change your position and drink fluids.     You notice that  your baby has stopped moving or is moving much less than normal.   Watch closely for changes in your health, and be sure to contact your doctor if you have any problems.  Current as of: April 30, 2024  Content Version: 14.3    2024 [employer].   Care instructions adapted under license by your healthcare professional. If you have questions about a medical condition or this instruction, always ask your healthcare professional. [employer] disclaims any warranty or liability for your use of this information.  Weeks 10 to 14 of Your Pregnancy: Care Instructions  It's now possible to hear the fetus's heartbeat with a special ultrasound device. And the fetus's organs are developing.    Decide about tests to check for birth defects. Think about your age, your chance of passing on a family disease, your need to know about any problems, and what you might do after you have the test results.   It's okay to exercise. Try activities such as walking or swimming. Check with your doctor before starting a new program.     You may feel more tired than usual.  Taking naps during the day may help.     You may feel emotional.  It might help to talk to someone.     You may have headaches.  Try lying down and putting a cool cloth over your forehead.     You can use acetaminophen (Tylenol) for pain relief.  Don't take any anti-inflammatory medicines (such as Advil, Motrin, Aleve), unless your doctor says it's okay.     You may feel a fullness or aching in your lower belly.  This can feel like the kind of cramps you might get before a period. A back rub may help.     You may need to urinate more.  Your growing uterus and changing hormones can affect your bladder.     You may feel sick to your stomach (morning sickness).  Try avoiding food and smells that make you feel sick.     Your breasts may feel different.  They may feel tender or get bigger. Your nipples may get darker. Try a bra that gives you good support.      "Avoid alcohol, tobacco, and drugs (including marijuana).  If you need help quitting, talk to your doctor.     Take a daily prenatal vitamin.  Choose one with folic acid.   Follow-up care is a key part of your treatment and safety. Be sure to make and go to all appointments, and call your doctor if you are having problems. It's also a good idea to know your test results and keep a list of the medicines you take.  Where can you learn more?  Go to https://www.[employer].net/patiented  Enter E090 in the search box to learn more about \"Weeks 10 to 14 of Your Pregnancy: Care Instructions.\"  Current as of: April 30, 2024  Content Version: 14.3    2024 [employer].   Care instructions adapted under license by your healthcare professional. If you have questions about a medical condition or this instruction, always ask your healthcare professional. [employer] disclaims any warranty or liability for your use of this information.      Nutrition During Pregnancy: Care Instructions  Overview     Healthy eating when you are pregnant is important for you and your baby. It can help you feel well and have a successful pregnancy and delivery. During pregnancy your nutrition needs increase. Even if you have excellent eating habits, your doctor may recommend a multivitamin to make sure you get enough iron and folic acid.  You may wonder how much weight you should gain. In general, if you were at a healthy weight before you became pregnant, then you should gain between 25 and 35 pounds. If you were overweight before pregnancy, then you'll likely be advised to gain 15 to 25 pounds. If you were underweight before pregnancy, then you'll probably be advised to gain 28 to 40 pounds. Your doctor will work with you to set a weight goal that is right for you. Gaining a healthy amount of weight helps you have a healthy baby.  Follow-up care is a key part of your treatment and safety. Be sure to make and go to all " appointments, and call your doctor if you are having problems. It's also a good idea to know your test results and keep a list of the medicines you take.  How can you care for yourself at home?  Eat plenty of fruits and vegetables. Include a variety of orange, yellow, and leafy dark-green vegetables every day.  Choose whole-grain bread, cereal, and pasta. Good choices include whole wheat bread, whole wheat pasta, brown rice, and oatmeal.  Get 4 or more servings of milk and milk products each day. Good choices include nonfat or low-fat milk, yogurt, and cheese. If you cannot eat milk products, you can get calcium from calcium-fortified products such as orange juice, soy milk, and tofu. Other non-milk sources of calcium include leafy green vegetables, such as broccoli, kale, mustard greens, turnip greens, bok elenita, and brussels sprouts.  If you eat meat, pick lower-fat types. Good choices include lean cuts of meat and chicken or turkey without the skin.  Avoid fish that are high in mercury. These include shark, swordfish, monroe mackerel, marlin, orange roughy, and bigeye tuna, as well as tilefish from the [facility].  It's okay to eat up to 8 to 12 ounces a week of fish that are low in mercury or up to 4 ounces a week of fish that have medium levels of mercury. Some fish that are low in mercury are salmon, shrimp, canned light tuna, cod, and tilapia. Some fish that have medium levels of mercury are halibut and white albacore tuna.  For more advice about eating fish, you can visit the U.S. Food and Drug Administration (FDA) or U.S. Environmental Protection Agency (EPA) website.  Heat lunch meats (such as turkey, ham, or bologna) to 165 F before you eat them. This reduces your risk of getting sick from a kind of bacteria that can be found in lunch meats.  Do not eat unpasteurized soft cheeses, such as brie, feta, fresh mozzarella, and blue cheese. They have a bacteria that could harm your baby.  Limit caffeine to  "about 200 to 300 mg per day. On average, a cup of brewed coffee has around 80 to 100 mg of caffeine.  Do not drink any alcohol. No amount of alcohol has been found to be safe during pregnancy.  Do not diet or try to lose weight. For example, do not follow a low-carbohydrate diet. If you are overweight at the start of your pregnancy, your doctor will work with you to manage your weight gain.  Tell your doctor about all vitamins and supplements you take.  When should you call for help?  Watch closely for changes in your health, and be sure to contact your doctor if you have any problems.  Where can you learn more?  Go to https://www.[employer].net/patiented  Enter Y785 in the search box to learn more about \"Nutrition During Pregnancy: Care Instructions.\"  Current as of: September 20, 2023  Content Version: 14.3    2024 [employer].   Care instructions adapted under license by your healthcare professional. If you have questions about a medical condition or this instruction, always ask your healthcare professional. [employer] disclaims any warranty or liability for your use of this information.    Exercise During Pregnancy: Care Instructions  Overview     Exercise is good for you during a healthy pregnancy. It can relieve back pain, swelling, and other discomforts. It also prepares your muscles for childbirth. And exercise can improve your energy level and help you sleep better.  If your doctor advises it, get more exercise. For example, walking is a good choice. Bit by bit, increase the amount you walk every day. Try for at least 30 minutes on most days of the week. You could also try a prenatal exercise class. But if you do not already exercise, be sure to talk with your doctor before you start a new exercise program. Doctors do not recommend contact sports during pregnancy.  Follow-up care is a key part of your treatment and safety. Be sure to make and go to all appointments, and call your " "doctor if you are having problems. It's also a good idea to know your test results and keep a list of the medicines you take.  How can you care for yourself at home?  Talk with your doctor about the right kind of exercise for each stage of pregnancy.  Listen to your body to know if your exercise is at a safe level.  Do not become overheated while you exercise. High body temperature can be harmful. Avoid activities that can make your body too hot.  If you feel tired, take it easy. You might walk instead of run.  If you are used to strenuous exercise, ask your doctor how to know when it's time to slow down.  If you exercised before getting pregnant, you should be able to keep up your routine early in your pregnancy. Later in your pregnancy, you may want to switch to more gentle activities.  Drink plenty of fluids before, during, and after exercise.  Avoid contact sports, such as soccer and basketball. Also avoid risky activities. These include scuba diving, horseback riding, and exercising at a high altitude (above 6,000 feet). If you live in a place with a high altitude, talk to your doctor about how you can exercise safely.  Do not get overtired while you exercise. You should be able to talk while you work out.  After your fourth month of pregnancy, avoid exercises that require you to lie flat on your back on a hard surface. These include sit-ups and some yoga poses.  Get plenty of rest. You may be very tired while you are pregnant.  Where can you learn more?  Go to https://www.[employer].net/patiented  Enter S801 in the search box to learn more about \"Exercise During Pregnancy: Care Instructions.\"  Current as of: April 30, 2024  Content Version: 14.3    2024 [employer].   Care instructions adapted under license by your healthcare professional. If you have questions about a medical condition or this instruction, always ask your healthcare professional. [employer] disclaims any warranty or " liability for your use of this information.    Learning About Pregnancy When You Are Underweight or Overweight  How does your weight affect your pregnancy?    The basics of prenatal care are the same for everyone, regardless of size. You'll get what you need to have a healthy baby.  But if you are not at a weight that is healthy for you, it can make a difference in a few things. Being underweight or overweight can increase the chances of some problems during pregnancy. So your doctor or midwife will pay close attention to your health and your baby's health. You may have some extra doctor or midwife visits and tests. And you may have some tests earlier in your pregnancy.  Work with your doctor or midwife to get the care you need. Go to all your doctor or midwife visits, and follow their advice about what to do and what to avoid during pregnancy.  How much weight gain is healthy during pregnancy?  There's no fixed number of pounds that you should be aiming for. Instead, there's a range of weight gain that's good for you and your baby. Based on your weight before pregnancy, experts say it's generally best to gain about:  28 lb (13 kg) to 40 lb (18 kg) if you're underweight.  25 lb (11 kg) to 35 lb (16 kg) if you're at a healthy weight.  15 lb (7 kg) to 25 lb (11 kg) if you're overweight.  11 lb (5 kg) to 20 lb (9 kg) if you're very overweight (obese). In some cases, a doctor may recommend that you don't gain any weight.  If you have questions about weight gain during pregnancy, talk with your doctor about what's right for you. Gaining a healthy amount of weight helps you have a healthy pregnancy.  How much extra food do you need to eat?  How much food you need to eat during pregnancy depends on:  Your height.  How much you weigh when you get pregnant.  How active you are.  If you're carrying more than one fetus (multiple pregnancy).  In the first trimester, you'll probably need the same amount of calories as you did  "before you were pregnant. In general, in your second trimester, you need to eat about 340 extra calories a day. In your third trimester, you need to eat about 450 extra calories a day.  What can you do to have a healthy pregnancy?  The best things you can do for you and your baby are to eat healthy foods, get regular exercise, avoid alcohol and smoking, and go to your doctor or midwife visits.  Eat a variety of foods from all the food groups. Make sure to get enough calcium and folic acid. Ask your doctor or midwife how much folic acid you should be taking.  You may want to work with a dietitian to help you plan healthy meals to get the right amount of calories for you.  Talk to your doctor or midwife about how you can exercise safely. If you didn't exercise much before you got pregnant, talk to your doctor or midwife about how you can slowly get more active. They may want to set up an exercise program with you.  Where can you learn more?  Go to https://www.Iwebalize.net/patiented  Enter B644 in the search box to learn more about \"Learning About Pregnancy When You Are Underweight or Overweight.\"  Current as of: April 30, 2024  Content Version: 14.3    2024 [employer].   Care instructions adapted under license by your healthcare professional. If you have questions about a medical condition or this instruction, always ask your healthcare professional. [employer] disclaims any warranty or liability for your use of this information.    You have been provided the CDC Warning Signs in Pregnancy document.    Additional copies can be found here: www.Dime.com/686876.pdf  Weeks 14 to 18 of Your Pregnancy: Care Instructions  Around this time, you may start to look pregnant. Your baby is now able to pass urine. And the first stool (meconium) is starting to collect in your baby's intestines. Hair is starting to grow on your baby's head.    You may notice some skin changes, such as itchy spots on your " "palms or acne on your face.   At your next doctor visit, you may have an ultrasound. So you might think about whether you want to know the sex of your baby. Also ask your doctor about flu and COVID-19 shots.      How to reduce stress   Ask for help when you need it.  Try to avoid things that cause you stress.  Seek out things that relieve stress, such as breathing exercises or yoga.     How to get exercise   If you don't usually exercise, start slowly. Short walks may be a good choice.  Try to be active 30 minutes a day, at least 5 days a week.  Avoid activities where you're more likely to fall.  Use light weights to reduce stress on your joints.     How to stay at a healthy weight for you   Talk to your doctor or midwife about how much weight you should gain.  It's generally best to gain:  About 28 to 40 pounds if you're underweight.  About 25 to 35 pounds if you're at a healthy weight.  About 15 to 25 pounds if you're overweight.  About 11 to 20 pounds if you're very overweight (obese).  Follow-up care is a key part of your treatment and safety. Be sure to make and go to all appointments, and call your doctor if you are having problems. It's also a good idea to know your test results and keep a list of the medicines you take.  Where can you learn more?  Go to https://www.Tadcast.net/patiented  Enter I453 in the search box to learn more about \"Weeks 14 to 18 of Your Pregnancy: Care Instructions.\"  Current as of: April 30, 2024  Content Version: 14.3    2024 [employer].   Care instructions adapted under license by your healthcare professional. If you have questions about a medical condition or this instruction, always ask your healthcare professional. [employer] disclaims any warranty or liability for your use of this information.    Second-Trimester Fetal Ultrasound: About This Test  What is it?     Fetal ultrasound is a test that uses sound waves to make pictures of your baby (fetus) and " placenta inside the uterus. The test is the safest way to find out the age, size, and position of your baby. You also may be able to find out the sex of your baby. (But the test isn't done just to find out a baby's sex.)  No known risks to the mother or the baby are linked to fetal ultrasound. But you may feel anxious if the test reveals a problem with your pregnancy or baby.  Why is this test done?  In the second trimester, a fetal ultrasound is done to:  Estimate the number of weeks and days a fetus has developed since the beginning of the pregnancy. This is called the gestational age.  Look at the size and position of the fetus, the placenta, and the fluid that surrounds the fetus.  Find major birth defects, such as heart problems or problems with the brain and spinal cord (neural tube defects). But the test may not be able to find many minor defects and some major birth defects.  How do you prepare for the test?  In general, there's nothing you have to do before this test, unless your doctor tells you to.  How is the test done?  You may be able to leave your clothes on, or you will be given a gown to wear.  You will lie on your back on a padded examination table.  A gel will be spread on your belly. It will be removed after the test.  A small, handheld device called a transducer will be pressed against the gel on your skin and moved across your belly several times.  You may watch the monitor to see the picture of your baby during the test.  What happens after the test?  You will probably be able to go home right away.  You most likely will be able to go back to your usual activities right away.  Follow-up care is a key part of your treatment and safety. Be sure to make and go to all appointments, and call your doctor if you are having problems. It's also a good idea to keep a list of the medicines you take. Ask your doctor when you can expect to have your test results.  Where can you learn more?  Go to  "https://www.Datical.net/patiented  Enter Y671 in the search box to learn more about \"Second-Trimester Fetal Ultrasound: About This Test.\"  Current as of: April 30, 2024  Content Version: 14.3    2024 [employer].   Care instructions adapted under license by your healthcare professional. If you have questions about a medical condition or this instruction, always ask your healthcare professional. [employer] disclaims any warranty or liability for your use of this information.    During Pregnancy: Exercises  Introduction  Here are some examples of exercises to do during your pregnancy. Start each exercise slowly. Ease off the exercise if you start to have pain.  Talk to your doctor about when you can start these exercises and which ones will work best for you.  How to do the exercises  Neck rotation    Sit up straight in a firm chair, or stand up straight. If you're standing, keep your feet about hip-width apart.  Keeping your chin level, turn your head to the right and hold for 15 to 30 seconds.  Turn your head to the left and hold for 15 to 30 seconds.  Repeat 2 to 4 times.  Neck stretch to the front    Sit up straight in a firm chair, or stand up straight. Look straight ahead. If you're standing, keep your feet about hip-width apart.  Slowly bend your head forward without moving your shoulders.  Hold for 15 to 30 seconds, then return to your starting position.  Repeat 2 to 4 times.  Back press    Stand with your back 10 to 12 inches away from a wall.  Lean into the wall until your back is against it. Press your lower back against the wall by pulling in your stomach muscles.  Slowly slide down until your knees are slightly bent, pressing your lower back against the wall.  Hold for at least 6 seconds, then slide back up the wall.  Repeat 8 to 12 times.  Over time, work up to holding this position for as much as 1 minute.  Trunk twist    Sit on the floor with your legs crossed. If that's not " comfortable, you can sit on a folded blanket so your bottom is a few inches off the floor. Or you can sit on a chair with your knees hip-width apart and your feet flat on the floor.  Reach your left hand toward your right knee. You can place your right hand at your side for support.  Slowly twist your body (trunk) to your right.  Relax and return to your starting position.  Repeat 2 to 4 times.  Switch your hands and twist to your left.  Repeat 2 to 4 times.  Pelvic rocking on hands and knees    Start on your hands and knees. Place your wrists directly below your shoulders and your knees below your hips.  Breathe in slowly. Tuck your head downward and round your back up, making a curve with your back in the shape of the letter C. Hold this position for about 6 seconds.  Breathe out slowly and bring your head back up. Relax, keeping your back straight. (Don't allow it to curve toward the floor.) Hold for about 6 seconds.  Repeat 8 to 12 times, gently rocking your pelvis.  Pelvic tilt    Lie on your back with your knees bent and your feet flat on the floor.  Tighten your belly muscles by pulling your belly button in toward your spine. Press your lower back to the floor. You should feel your hips and pelvis rock back.  Hold for 6 seconds while breathing smoothly, and then relax.  Repeat 8 to 12 times.  Do this exercise only during the first 4 months of pregnancy. After this point, lying on your back is not recommended, because it can cause blood flow problems for you and your baby.  Backward stretch    Start on your hands and knees with your knees 8 to 10 inches apart, hands directly below your shoulders, and arms and back straight.  Keeping your arms straight, slowly lower your buttocks toward your heels and tuck your head toward your knees. Hold for 15 to 30 seconds.  Slowly return to the starting position.  Repeat 2 to 4 times.  Forward bend    Sit comfortably in a chair, with your arms relaxed.  Slowly bend  forward, allowing your arms to hang down. Lean only as far as you can without feeling discomfort or pressure on your belly.  Hold for 15 to 30 seconds and then slowly sit up straight.  Repeat 2 to 4 times or to your comfort level.  Donkey kick    Start on your hands and knees. Place your hands directly below your shoulders, and keep your arms straight.  Tighten your belly muscles by pulling your belly button in toward your spine. Keep breathing normally, and don't hold your breath.  Lift one knee and bring it toward your elbow.  Slowly extend that leg behind you without completely straightening it. Be careful not to let your hip drop down. Avoid arching your back.  Hold your leg behind you for about 6 seconds.  Return to your starting position.  Repeat 8 to 12 times for each leg.  Tailor sitting    Sit on the floor.  Bring your feet close to your body while crossing your ankles.  Keep your back straight. Relax your legs and let your knees drop toward the floor.  Hold this position for as long as you are comfortable.  Toe reach    Sit on the floor with your back straight, legs about 12 inches apart, and feet relaxed outward.  Stretch your hands forward toward your right foot, then sit up.  Stretch your hands straight forward, then sit up.  Stretch your hands forward toward your left foot, then sit up.  Hold each stretch for 15 to 30 seconds.  Repeat 2 to 4 times.  Follow-up care is a key part of your treatment and safety. Be sure to make and go to all appointments, and call your doctor if you are having problems. It's also a good idea to know your test results and keep a list of the medicines you take.  Current as of: April 30, 2024  Content Version: 14.3    2024 [employer].   Care instructions adapted under license by your healthcare professional. If you have questions about a medical condition or this instruction, always ask your healthcare professional. [employer] disclaims any warranty or  liability for your use of this information.

## 2025-02-11 PROBLEM — Z34.80 SUPERVISION OF OTHER NORMAL PREGNANCY, ANTEPARTUM: Status: ACTIVE | Noted: 2025-02-11

## 2025-02-11 PROBLEM — Z34.80 PRENATAL CARE, SUBSEQUENT PREGNANCY, UNSPECIFIED TRIMESTER: Status: RESOLVED | Noted: 2025-01-15 | Resolved: 2025-02-11

## 2025-02-11 LAB
ABO + RH BLD: NORMAL
BLD GP AB SCN SERPL QL: NEGATIVE
SPECIMEN EXP DATE BLD: NORMAL

## 2025-02-12 ENCOUNTER — HOSPITAL ENCOUNTER (OUTPATIENT)
Dept: ULTRASOUND IMAGING | Facility: HOSPITAL | Age: 35
Discharge: HOME | End: 2025-02-12
Attending: MIDWIFE | Admitting: MIDWIFE
Payer: COMMERCIAL

## 2025-02-12 ENCOUNTER — MYC MEDICAL ADVICE (OUTPATIENT)
Dept: MIDWIFE SERVICES | Facility: CLINIC | Age: 35
End: 2025-02-12

## 2025-02-12 ENCOUNTER — PRENATAL OFFICE VISIT (OUTPATIENT)
Dept: MIDWIFE SERVICES | Facility: CLINIC | Age: 35
End: 2025-02-12
Payer: COMMERCIAL

## 2025-02-12 VITALS
WEIGHT: 110 LBS | OXYGEN SATURATION: 98 % | SYSTOLIC BLOOD PRESSURE: 124 MMHG | HEIGHT: 59 IN | BODY MASS INDEX: 22.18 KG/M2 | HEART RATE: 102 BPM | DIASTOLIC BLOOD PRESSURE: 72 MMHG

## 2025-02-12 DIAGNOSIS — O36.4XX0 IUFD AT 20 WEEKS OR MORE OF GESTATION: ICD-10-CM

## 2025-02-12 DIAGNOSIS — O09.90 HIGH-RISK PREGNANCY, UNSPECIFIED TRIMESTER: ICD-10-CM

## 2025-02-12 DIAGNOSIS — O09.90 SUPERVISION OF HIGH RISK PREGNANCY, ANTEPARTUM: ICD-10-CM

## 2025-02-12 DIAGNOSIS — Z86.32 HISTORY OF GESTATIONAL DIABETES MELLITUS (GDM) IN PRIOR PREGNANCY, CURRENTLY PREGNANT: ICD-10-CM

## 2025-02-12 DIAGNOSIS — O09.90 HIGH-RISK PREGNANCY, UNSPECIFIED TRIMESTER: Primary | ICD-10-CM

## 2025-02-12 DIAGNOSIS — F29 PSYCHOSIS, UNSPECIFIED PSYCHOSIS TYPE (H): ICD-10-CM

## 2025-02-12 DIAGNOSIS — O09.299 HISTORY OF GESTATIONAL DIABETES MELLITUS (GDM) IN PRIOR PREGNANCY, CURRENTLY PREGNANT: ICD-10-CM

## 2025-02-12 DIAGNOSIS — Z13.29 SCREENING FOR THYROID DISORDER: ICD-10-CM

## 2025-02-12 PROBLEM — Z34.90 ENCOUNTER FOR INDUCTION OF LABOR: Status: RESOLVED | Noted: 2023-06-18 | Resolved: 2025-02-12

## 2025-02-12 PROBLEM — D50.8 IRON DEFICIENCY ANEMIA SECONDARY TO INADEQUATE DIETARY IRON INTAKE: Status: RESOLVED | Noted: 2020-06-15 | Resolved: 2025-02-12

## 2025-02-12 PROBLEM — O40.3XX0 POLYHYDRAMNIOS AFFECTING PREGNANCY IN THIRD TRIMESTER: Status: RESOLVED | Noted: 2023-05-30 | Resolved: 2025-02-12

## 2025-02-12 PROBLEM — O09.92 HIGH-RISK PREGNANCY IN SECOND TRIMESTER: Status: RESOLVED | Noted: 2023-05-16 | Resolved: 2025-02-12

## 2025-02-12 LAB
BASOPHILS # BLD AUTO: 0 10E3/UL (ref 0–0.2)
BASOPHILS NFR BLD AUTO: 0 %
EOSINOPHIL # BLD AUTO: 0.1 10E3/UL (ref 0–0.7)
EOSINOPHIL NFR BLD AUTO: 1 %
ERYTHROCYTE [DISTWIDTH] IN BLOOD BY AUTOMATED COUNT: 12.8 % (ref 10–15)
EST. AVERAGE GLUCOSE BLD GHB EST-MCNC: 97 MG/DL
HBA1C MFR BLD: 5 % (ref 0–5.6)
HBV SURFACE AG SERPL QL IA: NONREACTIVE
HCT VFR BLD AUTO: 39.2 % (ref 35–47)
HCV AB SERPL QL IA: NONREACTIVE
HGB BLD-MCNC: 13.2 G/DL (ref 11.7–15.7)
HIV 1+2 AB+HIV1 P24 AG SERPL QL IA: NONREACTIVE
IMM GRANULOCYTES # BLD: 0 10E3/UL
IMM GRANULOCYTES NFR BLD: 0 %
LYMPHOCYTES # BLD AUTO: 1.1 10E3/UL (ref 0.8–5.3)
LYMPHOCYTES NFR BLD AUTO: 18 %
MCH RBC QN AUTO: 29.5 PG (ref 26.5–33)
MCHC RBC AUTO-ENTMCNC: 33.7 G/DL (ref 31.5–36.5)
MCV RBC AUTO: 88 FL (ref 78–100)
MONOCYTES # BLD AUTO: 0.3 10E3/UL (ref 0–1.3)
MONOCYTES NFR BLD AUTO: 6 %
NEUTROPHILS # BLD AUTO: 4.4 10E3/UL (ref 1.6–8.3)
NEUTROPHILS NFR BLD AUTO: 74 %
PLATELET # BLD AUTO: 200 10E3/UL (ref 150–450)
RBC # BLD AUTO: 4.47 10E6/UL (ref 3.8–5.2)
RUBV IGG SERPL QL IA: 3.15 INDEX
RUBV IGG SERPL QL IA: POSITIVE
T PALLIDUM AB SER QL: NONREACTIVE
VZV IGG SER QL IA: 1.08 S/CO
VZV IGG SER QL IA: POSITIVE
WBC # BLD AUTO: 5.9 10E3/UL (ref 4–11)

## 2025-02-12 PROCEDURE — 87389 HIV-1 AG W/HIV-1&-2 AB AG IA: CPT | Performed by: MIDWIFE

## 2025-02-12 PROCEDURE — 86850 RBC ANTIBODY SCREEN: CPT | Performed by: MIDWIFE

## 2025-02-12 PROCEDURE — 76805 OB US >/= 14 WKS SNGL FETUS: CPT

## 2025-02-12 PROCEDURE — 86901 BLOOD TYPING SEROLOGIC RH(D): CPT | Performed by: MIDWIFE

## 2025-02-12 PROCEDURE — 87340 HEPATITIS B SURFACE AG IA: CPT | Performed by: MIDWIFE

## 2025-02-12 PROCEDURE — 86803 HEPATITIS C AB TEST: CPT | Performed by: MIDWIFE

## 2025-02-12 PROCEDURE — 86780 TREPONEMA PALLIDUM: CPT | Performed by: MIDWIFE

## 2025-02-12 PROCEDURE — 86787 VARICELLA-ZOSTER ANTIBODY: CPT | Performed by: MIDWIFE

## 2025-02-12 PROCEDURE — 86900 BLOOD TYPING SEROLOGIC ABO: CPT | Performed by: MIDWIFE

## 2025-02-12 PROCEDURE — 36415 COLL VENOUS BLD VENIPUNCTURE: CPT | Performed by: MIDWIFE

## 2025-02-12 PROCEDURE — 83036 HEMOGLOBIN GLYCOSYLATED A1C: CPT | Performed by: MIDWIFE

## 2025-02-12 PROCEDURE — 85025 COMPLETE CBC W/AUTO DIFF WBC: CPT | Performed by: MIDWIFE

## 2025-02-12 PROCEDURE — 86762 RUBELLA ANTIBODY: CPT | Performed by: MIDWIFE

## 2025-02-12 RX ORDER — CHOLECALCIFEROL (VITAMIN D3) 50 MCG
2 TABLET ORAL DAILY
Qty: 180 TABLET | Refills: 1 | Status: SHIPPED | OUTPATIENT
Start: 2025-02-12 | End: 2025-08-11

## 2025-02-12 RX ORDER — PRENATAL VIT/IRON FUM/FOLIC AC 27MG-0.8MG
1 TABLET ORAL DAILY
Qty: 90 TABLET | Refills: 3 | Status: SHIPPED | OUTPATIENT
Start: 2025-02-12

## 2025-02-12 RX ORDER — CALCIUM CARBONATE 500(1250)
1 TABLET ORAL 2 TIMES DAILY
Qty: 90 TABLET | Refills: 1 | Status: SHIPPED | OUTPATIENT
Start: 2025-02-12 | End: 2025-08-11

## 2025-02-12 ASSESSMENT — EDINBURGH POSTNATAL DEPRESSION SCALE (EPDS)
THINGS HAVE BEEN GETTING ON TOP OF ME: NO, I HAVE BEEN COPING AS WELL AS EVER
I HAVE FELT SAD OR MISERABLE: NO, NOT AT ALL
I HAVE BEEN ANXIOUS OR WORRIED FOR NO GOOD REASON: NO, NOT AT ALL
I HAVE LOOKED FORWARD WITH ENJOYMENT TO THINGS: AS MUCH AS I EVER DID
I HAVE BEEN SO UNHAPPY THAT I HAVE BEEN CRYING: NO, NEVER
I HAVE BEEN ABLE TO LAUGH AND SEE THE FUNNY SIDE OF THINGS: AS MUCH AS I ALWAYS COULD
I HAVE FELT SCARED OR PANICKY FOR NO GOOD REASON: NO, NOT AT ALL
I HAVE BEEN ANXIOUS OR WORRIED FOR NO GOOD REASON: NO, NOT AT ALL
THE THOUGHT OF HARMING MYSELF HAS OCCURRED TO ME: NEVER
I HAVE BEEN SO UNHAPPY THAT I HAVE BEEN CRYING: NO, NEVER
I HAVE FELT SAD OR MISERABLE: NO, NOT AT ALL
THE THOUGHT OF HARMING MYSELF HAS OCCURRED TO ME: NEVER
I HAVE BEEN SO UNHAPPY THAT I HAVE HAD DIFFICULTY SLEEPING: NOT AT ALL
I HAVE LOOKED FORWARD WITH ENJOYMENT TO THINGS: AS MUCH AS I EVER DID
I HAVE BEEN ABLE TO LAUGH AND SEE THE FUNNY SIDE OF THINGS: AS MUCH AS I ALWAYS COULD
I HAVE BEEN SO UNHAPPY THAT I HAVE HAD DIFFICULTY SLEEPING: NOT AT ALL
I HAVE FELT SCARED OR PANICKY FOR NO GOOD REASON: NO, NOT AT ALL
THINGS HAVE BEEN GETTING ON TOP OF ME: NO, I HAVE BEEN COPING AS WELL AS EVER

## 2025-02-12 NOTE — PROGRESS NOTES
"PRENATAL VISIT   FIRST OBSTETRICAL EXAM - OB   Patient is a new patient to the [facility] midwives    Assessment / Impression   First prenatal visit at 17w1d  34 year old   Hx of term IUFD 38 wks (first pregnancy)   History of psychosis, hospitalized x 3 (did not have postpartum psychosis after her third baby)  Last pap = 2023 ASCUS neg HPV, next Pap due 2026  Pre-pregnant BMI 24.03  EDPS = 0/30, \"never\" to #10  Slight flow murmur heard at IOB visit, needs follow up at PP visit (asymptomatic)    Plan:      -IOB labs drawn including HgbA1C.   -Pt is not a candidate for drawing lead level per [facility] screening tool.   -Patient is interested in waterbirth.  Waterbirth education shared in IOB packet.   -Reviewed prenatal care schedule.   -Optimal nutrition and weight gain discussed. Pregnancy weight gain of 25-35 lbs (BMI 18.5-24.9) encouraged.   -Anticipatory guidance for common pregnancy questions and concerns reviewed.   -Danger s/sx for this trimester reviewed with patient.   -Reviewed genetic screening options with patient, patient does not elect for first trimester screening. The patient does not elect for quad screening.   -Reviewed carrier testing options with patient, patient does not elect for testing or referral to genetic counseling.  -IOB packet given and reviewed with patient.   -CNM services and hospital options reviewed; emergency and scheduling phone numbers given to patient.   -Because the patient does not have 1 high risk nor 2 or more moderate risk factors, low-dose aspirin not be initiated.  -Antepartum VTE risk factors absent.  -Pt is not a candidate for an antepartum OB consult.    -Recommend level 2 ultrasound with MFM in consultation for history of IUFD first pregnancy.  Patient says she \"does not want a lot of ultrasounds this pregnancy\"  -Return to clinic  2 weeks for follow-up .    Total time: 60 minutes spent on the date of the encounter doing chart review, review of " test results, patient visit and documentation.     Subjective:   Ema Rojas is a 34 year old  here today for her first obstetrical exam at 17w1d. Here alone . This pregnancy is  not planned, not prevented .  The patient reports nausea, vomiting, fatigue, and breast tenderness. She has a somewhat sure Patient's last menstrual period was 10/15/2024., predicting an expected date of delivery of Estimated Date of Delivery: 2025. Last period was normal. Her previous three cycles were normal. Her pregnancy is dated by LMP but will do US to check if correlates.     The patient states that she is in a monogamous relationship and states that she is safe. Offered GC/CT screening today and patient accepts for next visit (urine)    Pregnancy Risk Factors:Previous stillbirth    The patient has the following high risk factors for preeclampsia: none.   The patient has the following moderate risk factors for preeclampsia: none    The patient has the following antepartum risk factors for VTE (two or more risk factors, or 1 * risk factor, places patient at higher risk): none.    Clinical history/risk factors requiring antepartum OB consult: none.  Will have MFM referral/consultation for history of IUFD at term first pregnancy.  Patient states that she does not want to have a lot of ultrasounds this pregnancy but agrees to go see MFM for at least 1 visit and then she will decide what kind of  testing they would like to have this time.  She says that she had a lot of testing in her second pregnancy, then last in her third pregnancy.    Slight flow murmur heard and discussed with patient.  Discussed that slight flow murmurs can be normal in pregnancy.  Patient states that she has been asymptomatic, a little bit tired with her pregnancy but no shortness of breath, no fainting, no lightheadedness, no issues on exertion.    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   OB History    Para Term  AB Living   4 3 3 0 0 2   SAB IAB Ectopic Multiple Live Births   0 0 0 0 2      # Outcome Date GA Lbr Polo/2nd Weight Sex Type Anes PTL Lv   4 Current            3 Term 23 39w2d / 00:08 3.43 kg (7 lb 9 oz) M Vag-Spont Nitrous N PADMINI      Name: ANAMALE-EBISE      Apgar1: 9  Apgar5: 9   2 Term 21 37w6d 15:59 / 01:32 3.5 kg (7 lb 11.5 oz) M Vag-Spont EPI N PADMINI      Complications: Gestational diabetes, White classification A1 gestational diabetes mellitus (GDM)      Name: ANA,BABY BOY      Apgar1: 8  Apgar5: 9   1 Term 20 38w0d   F I.U. FETAL D   FD        History:   Past Medical History:   Diagnosis Date    ASCUS of cervix with negative high risk HPV 2023    E. coli UTI 2020    Hyperthyroidism     she has seen endocrinology 2021    Iron deficiency anemia secondary to inadequate dietary iron intake 06/15/2020    Need for Tdap vaccination 2022    Prior pregnancy with fetal demise 2020      Past Surgical History:   Procedure Laterality Date    NO HISTORY OF SURGERY        Family History   Problem Relation Age of Onset    No Known Problems Mother     No Known Problems Father     No Known Problems Maternal Grandmother     No Known Problems Maternal Grandfather     No Known Problems Paternal Grandmother     No Known Problems Paternal Grandfather     No Known Problems Brother     No Known Problems Brother     No Known Problems Sister     No Known Problems Sister     No Known Problems Sister     No Known Problems Sister     No Known Problems Son     No Known Problems Son       Social History     Tobacco Use    Smoking status: Never     Passive exposure: Never    Smokeless tobacco: Never   Vaping Use    Vaping status: Never Used   Substance Use Topics    Alcohol use: Never    Drug use: Never      Current Outpatient Medications   Medication Sig Dispense Refill    acetaminophen (TYLENOL) 325 MG tablet  "Take 2 tablets (650 mg) by mouth every 4 hours as needed for mild pain (Patient not taking: Reported on 1/15/2025) 60 tablet 0    doxylamine (UNISOM) 25 MG TABS tablet Take 0.5 tablets (12.5 mg) by mouth At Bedtime (Patient not taking: Reported on 1/15/2025) 30 tablet 4    ferrous gluconate (FERGON) 324 (38 Fe) MG tablet Take 1 tablet (324 mg) by mouth daily (with breakfast) (Patient not taking: Reported on 1/15/2025) 60 tablet 3    OLANZapine (ZYPREXA) 2.5 MG tablet Take 1 tablet (2.5 mg) by mouth At Bedtime (Patient not taking: Reported on 1/15/2025) 30 tablet 0    Prenatal Vit-DSS-Fe Cbn-FA (PRENATAL AD PO)  (Patient not taking: Reported on 1/15/2025)      Prenatal Vit-Fe Fumarate-FA (PRENATAL VITAMINS) 27-0.8 MG TABS Take 1 tablet by mouth daily. 90 tablet 3    senna-docusate (SENOKOT-S/PERICOLACE) 8.6-50 MG tablet Take 1 tablet by mouth 2 times daily as needed for constipation (Patient not taking: Reported on 1/15/2025) 60 tablet 0    vitamin B6 (PYRIDOXINE) 100 MG tablet Take 1 tablet (100 mg) by mouth 3 times daily (Patient not taking: Reported on 1/15/2025) 90 tablet 4      No Known Allergies     The patient's medical, surgical and family histories were reviewed     Pap smear: Last Pap: 8/2023, Result: ASCUS neg HPV, Next Due: 8/2026.     EPDS score today: 0/30.\"never\" to #10   History of anxiety or depression: no    Review of Systems   General: Fatigue but otherwise denies problem   Eyes: Denies problem   Ears/Nose/Throat: Denies problem   Cardiovascular: Denies problem   Respiratory: Denies problem   Gastrointestinal: Nausea without vomiting, otherwise negative   Genitourinary: Denies any discharge, vaginal bleeding or itchiness or any other problem   Musculoskeletal: Breast tenderness otherwise denies problem   Skin: Denies problem   Neurologic: Denies problem   Psychiatric: Denies problem   Endocrine: Denies problem   Heme/Lymphatic: Denies problem   Allergic/Immunologic: Denies problem " "      Medications: Prenatal vitamins and other supplements recommended  Please see past medical history section.   Please see family history section.   No general anesthesia ever  General health/lifestyle:   Patient states she  always wears a seatbelt.  There are no firearms in the home   There are working fire detectors in the home.  No smoking or tobacco use.   In a typical week, exercise includes: Minimal but walks  In a typical week she drinks no alcoholic drinks.  No recreational drug use.   No physical, sexual or emotional abuse.   She is in a safe, monogamous relationship with her . /partnered for 12 years.  Sexual history/family planning: Patient states she is surprised somewhat to be pregnant now but they were not preventing pregnancy  For birth control she uses: Nothing.  But she says that their family might be complete.  So she will look into birth control.  Patient is sexually active. In the last 12 months she has had 1 partner, her .  Types of sexual activity practiced: In a  Last menstrual period: Patient is \"pretty sure but not 100% sure\" October 15, 2024  Her periods come every 30 days lasting 3 days.  They are usually light flow.  PMS includes N/A.  Patient consents to STI testing today, blood work.  Declines GC chlamydia today but accepts a GC/chlamydia test on her urine next visit.           Objective:   Objective  There were no vitals filed for this visit.     Physical Exam:   General Appearance: Alert, cooperative, no distress, appears stated age   HEENT: Normocephalic, without obvious abnormality, atraumatic. Conjunctiva/corneas clear  Neck: Supple, symmetrical, trachea midline, no adenopathy.   Thyroid: not enlarged, symmetric, no tenderness/mass/nodules   Back: Symmetric, no curvature, ROM normal, no CVA tenderness   Lungs: Clear to auscultation bilaterally, respirations unlabored   Heart: Regular rate and rhythm, slight flow murmur, No edema to lower extremities. " (Asymptomatic)  Breasts: No breast masses, tenderness, asymmetry, or nipple discharge. Nipples are everted.   Abdomen: Gravid, soft, non-tender, bowel sounds active all four quadrants, no masses.   FHT: 150   Pelvic declined per pt  Musculoskeletal: Extremities normal, atraumatic, no cyanosis   Skin: Skin color, texture, turgor normal, no rashes or lesions   Lymph nodes: Cervical, supraclavicular, and axillary nodes normal   Neurologic: Alert and oriented x 3. Normal speech

## 2025-02-13 DIAGNOSIS — Z13.29 SCREENING FOR THYROID DISORDER: Primary | ICD-10-CM

## 2025-02-13 DIAGNOSIS — O09.90 SUPERVISION OF HIGH RISK PREGNANCY, ANTEPARTUM: Primary | ICD-10-CM

## 2025-02-13 LAB
BACTERIA UR CULT: NORMAL
T4 FREE SERPL-MCNC: 1.26 NG/DL (ref 0.9–1.7)
TSH SERPL DL<=0.005 MIU/L-ACNC: 1.11 UIU/ML (ref 0.3–4.2)

## 2025-02-16 NOTE — TELEPHONE ENCOUNTER
PHONE NOTE: Was able to reach patient by phone.  Was able to fill in history and clarify medications etc.  No further questions.

## 2025-04-01 ENCOUNTER — TRANSCRIBE ORDERS (OUTPATIENT)
Dept: MATERNAL FETAL MEDICINE | Facility: HOSPITAL | Age: 35
End: 2025-04-01

## 2025-04-01 ENCOUNTER — PRENATAL OFFICE VISIT (OUTPATIENT)
Dept: OBGYN | Facility: CLINIC | Age: 35
End: 2025-04-01
Payer: COMMERCIAL

## 2025-04-01 VITALS
HEART RATE: 129 BPM | WEIGHT: 117.4 LBS | TEMPERATURE: 98.2 F | DIASTOLIC BLOOD PRESSURE: 71 MMHG | OXYGEN SATURATION: 100 % | BODY MASS INDEX: 23.51 KG/M2 | SYSTOLIC BLOOD PRESSURE: 131 MMHG

## 2025-04-01 DIAGNOSIS — O26.90 PREGNANCY RELATED CONDITION, ANTEPARTUM: Primary | ICD-10-CM

## 2025-04-01 DIAGNOSIS — Z86.32 HISTORY OF GESTATIONAL DIABETES MELLITUS (GDM) IN PRIOR PREGNANCY, CURRENTLY PREGNANT: ICD-10-CM

## 2025-04-01 DIAGNOSIS — O09.299 HISTORY OF GESTATIONAL DIABETES MELLITUS (GDM) IN PRIOR PREGNANCY, CURRENTLY PREGNANT: ICD-10-CM

## 2025-04-01 DIAGNOSIS — O09.90 SUPERVISION OF HIGH RISK PREGNANCY, ANTEPARTUM: ICD-10-CM

## 2025-04-01 LAB
ERYTHROCYTE [DISTWIDTH] IN BLOOD BY AUTOMATED COUNT: 13.1 % (ref 10–15)
FERRITIN SERPL-MCNC: 25 NG/ML (ref 6–175)
GLUCOSE 1H P 50 G GLC PO SERPL-MCNC: 88 MG/DL (ref 70–129)
HCT VFR BLD AUTO: 37.1 % (ref 35–47)
HGB BLD-MCNC: 12.2 G/DL (ref 11.7–15.7)
HOLD SPECIMEN: NORMAL
IRON BINDING CAPACITY (ROCHE): 382 UG/DL (ref 240–430)
IRON SATN MFR SERPL: 27 % (ref 15–46)
IRON SERPL-MCNC: 103 UG/DL (ref 37–145)
MCH RBC QN AUTO: 30 PG (ref 26.5–33)
MCHC RBC AUTO-ENTMCNC: 32.9 G/DL (ref 31.5–36.5)
MCV RBC AUTO: 91 FL (ref 78–100)
PLATELET # BLD AUTO: 181 10E3/UL (ref 150–450)
RBC # BLD AUTO: 4.06 10E6/UL (ref 3.8–5.2)
WBC # BLD AUTO: 7.2 10E3/UL (ref 4–11)

## 2025-04-01 PROCEDURE — 99207 PR PRENATAL VISIT: CPT | Performed by: OBSTETRICS & GYNECOLOGY

## 2025-04-01 PROCEDURE — 83540 ASSAY OF IRON: CPT | Performed by: OBSTETRICS & GYNECOLOGY

## 2025-04-01 PROCEDURE — 85027 COMPLETE CBC AUTOMATED: CPT | Performed by: OBSTETRICS & GYNECOLOGY

## 2025-04-01 PROCEDURE — 3075F SYST BP GE 130 - 139MM HG: CPT | Performed by: OBSTETRICS & GYNECOLOGY

## 2025-04-01 PROCEDURE — 36415 COLL VENOUS BLD VENIPUNCTURE: CPT | Performed by: OBSTETRICS & GYNECOLOGY

## 2025-04-01 PROCEDURE — 82728 ASSAY OF FERRITIN: CPT | Performed by: OBSTETRICS & GYNECOLOGY

## 2025-04-01 PROCEDURE — 82950 GLUCOSE TEST: CPT | Performed by: OBSTETRICS & GYNECOLOGY

## 2025-04-01 PROCEDURE — 3078F DIAST BP <80 MM HG: CPT | Performed by: OBSTETRICS & GYNECOLOGY

## 2025-04-01 PROCEDURE — 83550 IRON BINDING TEST: CPT | Performed by: OBSTETRICS & GYNECOLOGY

## 2025-04-01 PROCEDURE — 0500F INITIAL PRENATAL CARE VISIT: CPT | Performed by: OBSTETRICS & GYNECOLOGY

## 2025-04-01 ASSESSMENT — ANXIETY QUESTIONNAIRES
GAD7 TOTAL SCORE: 0
GAD7 TOTAL SCORE: 0
5. BEING SO RESTLESS THAT IT IS HARD TO SIT STILL: NOT AT ALL
IF YOU CHECKED OFF ANY PROBLEMS ON THIS QUESTIONNAIRE, HOW DIFFICULT HAVE THESE PROBLEMS MADE IT FOR YOU TO DO YOUR WORK, TAKE CARE OF THINGS AT HOME, OR GET ALONG WITH OTHER PEOPLE: NOT DIFFICULT AT ALL
2. NOT BEING ABLE TO STOP OR CONTROL WORRYING: NOT AT ALL
3. WORRYING TOO MUCH ABOUT DIFFERENT THINGS: NOT AT ALL
6. BECOMING EASILY ANNOYED OR IRRITABLE: NOT AT ALL
1. FEELING NERVOUS, ANXIOUS, OR ON EDGE: NOT AT ALL
7. FEELING AFRAID AS IF SOMETHING AWFUL MIGHT HAPPEN: NOT AT ALL

## 2025-04-01 ASSESSMENT — PATIENT HEALTH QUESTIONNAIRE - PHQ9
5. POOR APPETITE OR OVEREATING: NOT AT ALL
SUM OF ALL RESPONSES TO PHQ QUESTIONS 1-9: 2

## 2025-04-01 NOTE — PATIENT INSTRUCTIONS
"Weeks 22 to 26 of Your Pregnancy: Care Instructions  Your baby's lungs are getting ready for breathing. Your baby may respond to your voice. Your baby likely turns less, and kicks or jerks more. Jerking may mean that your baby has hiccups.    Think about taking childbirth classes. And start to think about whether you want to have pain medicine during labor.   At your next doctor visit, you may be tested for anemia and for high blood sugar that first occurs during pregnancy (gestational diabetes). These conditions can cause problems for you and your baby.         To ease discomfort, such as back pain   Change your position often. Try not to sit or stand for too long.  Get some exercise. Things like walking or stretching may help.  Try using a heating pad or cold pack.        To ease or reduce swelling in your feet, ankles, hands, and fingers   Take off your rings.  Avoid high-sodium foods, such as potato chips.  Prop up your feet, and sleep with pillows under your feet.  Try to avoid standing for long periods of time.  Do not wear tight shoes.  Wear support stockings.  Kegel exercises to prevent urine from leaking    Squeeze your muscles as if you were trying not to pass gas. Your belly, legs, and buttocks shouldn't move. Hold the squeeze for 3 seconds, then relax for 5 to 10 seconds.    Add 1 second each week until you can squeeze for 10 seconds. Repeat the exercise 10 times a session. Do 3 to 8 sessions a day. If these exercises cause you pain, stop doing them and talk with your doctor.  Follow-up care is a key part of your treatment and safety. Be sure to make and go to all appointments, and call your doctor if you are having problems. It's also a good idea to know your test results and keep a list of the medicines you take.  Where can you learn more?  Go to https://www.healthwise.net/patiented  Enter G264 in the search box to learn more about \"Weeks 22 to 26 of Your Pregnancy: Care Instructions.\"  Current as of: " April 30, 2024  Content Version: 14.4    0459-9158 [employer].   Care instructions adapted under license by your healthcare professional. If you have questions about a medical condition or this instruction, always ask your healthcare professional. [employer] disclaims any warranty or liability for your use of this information.    Learning About Screening for Gestational Diabetes  What is gestational diabetes screening?     Screening for gestational diabetes is a way to look for high blood sugar during pregnancy. You drink some very sweet liquid. Then you have a blood test to see how your body uses sugar (glucose).  How is gestational diabetes screening done?  Screening for gestational diabetes may be done in a couple of ways.  Two-part screening.  Part one (glucose challenge test): A blood sample is taken after you drink a liquid that contains sugar (glucose). You don't need to stop eating or drinking before this test. If the test shows that you don't have a lot of sugar in your blood, you don't have gestational diabetes.  Part two (oral glucose tolerance test, or OGTT): If the first test shows a lot of sugar in your blood, then you may have an OGTT. You can't eat or drink for at least 8 hours before this test. A blood sample is taken, then you drink a sweet liquid. You have more blood tests after 1 to 3 hours. If the OGTT shows that you have a lot of sugar in your blood, you may have gestational diabetes.  One-part screening.  Sometimes doctors use the OGTT on its own. If the test shows that you don't have a lot of sugar in your blood, you don't have gestational diabetes. If you do have a lot of sugar in your blood, you may have the condition.  What are the risks of screening?  Your blood glucose level may drop very low toward the end of the test. If this happens, you may feel weak, hungry, and restless. Tell your doctor if you have these symptoms. The test usually will be stopped.  You may  "vomit after drinking the sweet liquid. If this happens, you may need to take the test at a later time.  Your doctor may do more glucose tests at other times during your pregnancy.  Follow-up care is a key part of your treatment and safety. Be sure to make and go to all appointments, and call your doctor if you are having problems. It's also a good idea to know your test results and keep a list of the medicines you take.  Where can you learn more?  Go to https://www.[employer].net/patiented  Enter A472 in the search box to learn more about \"Learning About Screening for Gestational Diabetes.\"  Current as of: April 30, 2024  Content Version: 14.4    8667-2150 [employer].   Care instructions adapted under license by your healthcare professional. If you have questions about a medical condition or this instruction, always ask your healthcare professional. [employer] disclaims any warranty or liability for your use of this information.    You have been provided the My Labor and Birth Wishes document.  Please review at home and bring to your next prenatal visit. Bring this sheet to the hospital for your birth. Give copies to your care team members and support person.   Additional copies can be found here:  [email or web address]  "

## 2025-04-01 NOTE — PROGRESS NOTES
24w0d   Feeling well.  Baby is active.  Overall doing well.  Wants to deliver at [facility].    Has not had FAS yet.  Will place order with MFM due to gest age > 20 wks.   Patient states mood is stable.  She is working 2 days a week.   goes to school.   She has 2 little boys at home.  She lost her first pregnancy a baby girl -- was an IUFD of unknown cause at outside hospital.  She is hoping for a girl this time.  GCT today.     Plan on breastfeeding? Yes  Patience Hogan MD

## 2025-04-16 ENCOUNTER — OFFICE VISIT (OUTPATIENT)
Dept: MATERNAL FETAL MEDICINE | Facility: HOSPITAL | Age: 35
End: 2025-04-16
Attending: STUDENT IN AN ORGANIZED HEALTH CARE EDUCATION/TRAINING PROGRAM
Payer: COMMERCIAL

## 2025-04-16 ENCOUNTER — ANCILLARY PROCEDURE (OUTPATIENT)
Dept: ULTRASOUND IMAGING | Facility: HOSPITAL | Age: 35
End: 2025-04-16
Attending: STUDENT IN AN ORGANIZED HEALTH CARE EDUCATION/TRAINING PROGRAM
Payer: COMMERCIAL

## 2025-04-16 DIAGNOSIS — O09.90 SUPERVISION OF HIGH RISK PREGNANCY, ANTEPARTUM: ICD-10-CM

## 2025-04-16 DIAGNOSIS — O09.299 HISTORY OF GESTATIONAL DIABETES MELLITUS (GDM) IN PRIOR PREGNANCY, CURRENTLY PREGNANT: ICD-10-CM

## 2025-04-16 DIAGNOSIS — Z86.32 HISTORY OF GESTATIONAL DIABETES MELLITUS (GDM) IN PRIOR PREGNANCY, CURRENTLY PREGNANT: ICD-10-CM

## 2025-04-16 DIAGNOSIS — O36.5990 PREGNANCY AFFECTED BY FETAL GROWTH RESTRICTION: ICD-10-CM

## 2025-04-16 DIAGNOSIS — O26.90 PREGNANCY RELATED CONDITION, ANTEPARTUM: ICD-10-CM

## 2025-04-16 DIAGNOSIS — O35.BXX0 ANOMALY OF HEART OF FETUS AFFECTING PREGNANCY, ANTEPARTUM, SINGLE OR UNSPECIFIED FETUS: ICD-10-CM

## 2025-04-16 DIAGNOSIS — O09.292 HISTORY OF STILLBIRTH IN CURRENTLY PREGNANT PATIENT, SECOND TRIMESTER: Primary | ICD-10-CM

## 2025-04-16 PROCEDURE — 76820 UMBILICAL ARTERY ECHO: CPT | Mod: 26 | Performed by: STUDENT IN AN ORGANIZED HEALTH CARE EDUCATION/TRAINING PROGRAM

## 2025-04-16 PROCEDURE — 99215 OFFICE O/P EST HI 40 MIN: CPT | Mod: 25 | Performed by: STUDENT IN AN ORGANIZED HEALTH CARE EDUCATION/TRAINING PROGRAM

## 2025-04-16 PROCEDURE — 76811 OB US DETAILED SNGL FETUS: CPT | Mod: 26 | Performed by: STUDENT IN AN ORGANIZED HEALTH CARE EDUCATION/TRAINING PROGRAM

## 2025-04-16 PROCEDURE — 76820 UMBILICAL ARTERY ECHO: CPT

## 2025-04-16 NOTE — NURSING NOTE
Eam Rojas is a  at 26w1d who presents to [facility] for L2 ultrasound. Pt reports positive fetal movement. Pt denies bldg/lof/change in discharge, contractions, headache, vision changes, chest pain/SOB or edema. SBAR given to Dr. Choe, see note in Epic.     After reviewing VELIA and fetal growth restriction with Ema at her [facility] appt, the decision was made to change her VELIA to her 16 week ultrasound making her VELIA . This was updated in [facility].

## 2025-04-21 NOTE — PROGRESS NOTES
The patient was seen for an [facility] today.  For a detailed report of the ultrasound examination, please see the ultrasound report which can be found under the imaging tab.    If you have questions regarding today's evaluation or if we can be of further service, please contact the [facility].    Martin Choe M.D.  Maternal Fetal-Medicine Specialist

## 2025-05-06 ENCOUNTER — PRENATAL OFFICE VISIT (OUTPATIENT)
Dept: OBGYN | Facility: CLINIC | Age: 35
End: 2025-05-06
Payer: COMMERCIAL

## 2025-05-06 VITALS
TEMPERATURE: 97.8 F | SYSTOLIC BLOOD PRESSURE: 123 MMHG | DIASTOLIC BLOOD PRESSURE: 70 MMHG | HEART RATE: 125 BPM | BODY MASS INDEX: 24.43 KG/M2 | WEIGHT: 122 LBS

## 2025-05-06 DIAGNOSIS — O36.5990 FETAL GROWTH RESTRICTION ANTEPARTUM: ICD-10-CM

## 2025-05-06 DIAGNOSIS — O09.90 SUPERVISION OF HIGH RISK PREGNANCY, ANTEPARTUM: ICD-10-CM

## 2025-05-06 DIAGNOSIS — Z87.59 HISTORY OF IUFD: Primary | ICD-10-CM

## 2025-05-06 DIAGNOSIS — O09.299 HISTORY OF GESTATIONAL DIABETES MELLITUS (GDM) IN PRIOR PREGNANCY, CURRENTLY PREGNANT: ICD-10-CM

## 2025-05-06 DIAGNOSIS — Z86.32 HISTORY OF GESTATIONAL DIABETES MELLITUS (GDM) IN PRIOR PREGNANCY, CURRENTLY PREGNANT: ICD-10-CM

## 2025-05-06 PROCEDURE — 3078F DIAST BP <80 MM HG: CPT | Performed by: OBSTETRICS & GYNECOLOGY

## 2025-05-06 PROCEDURE — 99207 PR PRENATAL VISIT: CPT | Performed by: OBSTETRICS & GYNECOLOGY

## 2025-05-06 PROCEDURE — 3074F SYST BP LT 130 MM HG: CPT | Performed by: OBSTETRICS & GYNECOLOGY

## 2025-05-06 PROCEDURE — 0502F SUBSEQUENT PRENATAL CARE: CPT | Performed by: OBSTETRICS & GYNECOLOGY

## 2025-05-06 NOTE — PROGRESS NOTES
"28w1d  No complaints today.  Normal fetal movement.   She passed the GCT.    We discussed MFM US findings from last US.    Recommendations for close follow-up were discussed as outlined in the MFM note.   Patient declining all tests and further US's.    Due to her h/o IUFD with her first pregnancy, she had many US's with  testing in the last 2 pregnancies and she feels like \"it didn't change anything\".  Reports that she had at least 20 US's with one of them.  This is the last baby and she does not want to have so many US's. She discussed that her LMP is ~ 11/10/24 not 10/15/24 as was originally reported.   We reviewed need for close follow-up due to risk for IUFD or abnormal fetal outcome with FGR.  Patient tentatively agrees to growth US in a month or 2.  Long discussion today and questions answered.  RR   "

## 2025-05-12 PROBLEM — Z87.59 HISTORY OF IUFD: Status: ACTIVE | Noted: 2025-05-12

## 2025-05-12 PROBLEM — O36.5990 FETAL GROWTH RESTRICTION ANTEPARTUM: Status: ACTIVE | Noted: 2025-05-12

## 2025-05-17 ENCOUNTER — HEALTH MAINTENANCE LETTER (OUTPATIENT)
Age: 35
End: 2025-05-17

## 2025-06-10 ENCOUNTER — TRANSCRIBE ORDERS (OUTPATIENT)
Dept: MATERNAL FETAL MEDICINE | Facility: CLINIC | Age: 35
End: 2025-06-10

## 2025-06-10 ENCOUNTER — PRENATAL OFFICE VISIT (OUTPATIENT)
Dept: OBGYN | Facility: CLINIC | Age: 35
End: 2025-06-10
Payer: COMMERCIAL

## 2025-06-10 VITALS
SYSTOLIC BLOOD PRESSURE: 129 MMHG | HEART RATE: 125 BPM | WEIGHT: 124 LBS | TEMPERATURE: 97.3 F | BODY MASS INDEX: 24.83 KG/M2 | DIASTOLIC BLOOD PRESSURE: 80 MMHG

## 2025-06-10 DIAGNOSIS — O36.5990 FETAL GROWTH RESTRICTION ANTEPARTUM: ICD-10-CM

## 2025-06-10 DIAGNOSIS — O36.4XX0 IUFD AT 20 WEEKS OR MORE OF GESTATION: ICD-10-CM

## 2025-06-10 DIAGNOSIS — O09.90 SUPERVISION OF HIGH RISK PREGNANCY, ANTEPARTUM: Primary | ICD-10-CM

## 2025-06-10 DIAGNOSIS — O26.90 PREGNANCY RELATED CONDITION, ANTEPARTUM: Primary | ICD-10-CM

## 2025-06-10 DIAGNOSIS — O09.899 TWO VESSEL UMBILICAL CORD, ANTEPARTUM: ICD-10-CM

## 2025-06-10 LAB
ERYTHROCYTE [DISTWIDTH] IN BLOOD BY AUTOMATED COUNT: 12.9 % (ref 10–15)
FERRITIN SERPL-MCNC: 14 NG/ML (ref 6–175)
HCT VFR BLD AUTO: 38 % (ref 35–47)
HGB BLD-MCNC: 12.5 G/DL (ref 11.7–15.7)
HOLD SPECIMEN: NORMAL
IRON BINDING CAPACITY (ROCHE): 411 UG/DL (ref 240–430)
IRON SATN MFR SERPL: 26 % (ref 15–46)
IRON SERPL-MCNC: 106 UG/DL (ref 37–145)
MCH RBC QN AUTO: 29.9 PG (ref 26.5–33)
MCHC RBC AUTO-ENTMCNC: 32.9 G/DL (ref 31.5–36.5)
MCV RBC AUTO: 91 FL (ref 78–100)
PLATELET # BLD AUTO: 149 10E3/UL (ref 150–450)
RBC # BLD AUTO: 4.18 10E6/UL (ref 3.8–5.2)
WBC # BLD AUTO: 5.9 10E3/UL (ref 4–11)

## 2025-06-10 PROCEDURE — 82728 ASSAY OF FERRITIN: CPT | Performed by: OBSTETRICS & GYNECOLOGY

## 2025-06-10 PROCEDURE — 36415 COLL VENOUS BLD VENIPUNCTURE: CPT | Performed by: OBSTETRICS & GYNECOLOGY

## 2025-06-10 PROCEDURE — 83550 IRON BINDING TEST: CPT | Performed by: OBSTETRICS & GYNECOLOGY

## 2025-06-10 PROCEDURE — 83540 ASSAY OF IRON: CPT | Performed by: OBSTETRICS & GYNECOLOGY

## 2025-06-10 PROCEDURE — 85027 COMPLETE CBC AUTOMATED: CPT | Performed by: OBSTETRICS & GYNECOLOGY

## 2025-06-10 NOTE — PROGRESS NOTES
33w1d   Feeling well, starting to get tired.    Baby is active.    Taking PNV and eating well.    Gaining weight, still below Ideal though.    Agrees to [facility] US at 35-36 wks,     (O09.90) Supervision of high risk pregnancy, antepartum  (primary encounter diagnosis)  Comment: ***  Plan: [facility]erral - Pregnancy, CBC         with Platelets and Reflex to Iron Studies        ***     (O36.4XX0) IUFD at 20 weeks or more of gestation  Comment: ***  Plan: [facility] Referral - Pregnancy        ***    (O36.5990) Fetal growth restriction antepartum  Comment: ***  Plan: [facility] Referral - Pregnancy        ***    (O09.899) Two vessel umbilical cord, antepartum  Comment: ***  Plan: [facility] Referral - Pregnancy        ***

## 2025-06-24 ENCOUNTER — PRENATAL OFFICE VISIT (OUTPATIENT)
Dept: OBGYN | Facility: CLINIC | Age: 35
End: 2025-06-24
Payer: COMMERCIAL

## 2025-06-24 VITALS
TEMPERATURE: 98.7 F | SYSTOLIC BLOOD PRESSURE: 120 MMHG | DIASTOLIC BLOOD PRESSURE: 80 MMHG | BODY MASS INDEX: 25.03 KG/M2 | HEART RATE: 121 BPM | WEIGHT: 125 LBS | OXYGEN SATURATION: 100 %

## 2025-06-24 DIAGNOSIS — O36.5990 FETAL GROWTH RESTRICTION ANTEPARTUM: ICD-10-CM

## 2025-06-24 DIAGNOSIS — O09.90 SUPERVISION OF HIGH RISK PREGNANCY, ANTEPARTUM: Primary | ICD-10-CM

## 2025-06-24 DIAGNOSIS — Z87.59 HISTORY OF IUFD: ICD-10-CM

## 2025-06-24 DIAGNOSIS — O09.299 HISTORY OF GESTATIONAL DIABETES MELLITUS (GDM) IN PRIOR PREGNANCY, CURRENTLY PREGNANT: ICD-10-CM

## 2025-06-24 DIAGNOSIS — Z86.32 HISTORY OF GESTATIONAL DIABETES MELLITUS (GDM) IN PRIOR PREGNANCY, CURRENTLY PREGNANT: ICD-10-CM

## 2025-06-24 PROCEDURE — 59025 FETAL NON-STRESS TEST: CPT | Performed by: OBSTETRICS & GYNECOLOGY

## 2025-06-24 PROCEDURE — 3079F DIAST BP 80-89 MM HG: CPT | Performed by: OBSTETRICS & GYNECOLOGY

## 2025-06-24 PROCEDURE — 0502F SUBSEQUENT PRENATAL CARE: CPT | Performed by: OBSTETRICS & GYNECOLOGY

## 2025-06-24 PROCEDURE — 99207 PR PRENATAL VISIT: CPT | Performed by: OBSTETRICS & GYNECOLOGY

## 2025-06-24 PROCEDURE — 3074F SYST BP LT 130 MM HG: CPT | Performed by: OBSTETRICS & GYNECOLOGY

## 2025-06-24 RX ORDER — MULTIVIT WITH MINERALS/LUTEIN
250 TABLET ORAL DAILY
Qty: 90 TABLET | Refills: 0 | Status: SHIPPED | OUTPATIENT
Start: 2025-06-24

## 2025-06-24 RX ORDER — FERROUS GLUCONATE 324(38)MG
324 TABLET ORAL EVERY OTHER DAY
Qty: 60 TABLET | Refills: 0 | Status: SHIPPED | OUTPATIENT
Start: 2025-06-24

## 2025-06-24 NOTE — PATIENT INSTRUCTIONS
Week 37 of Your Pregnancy: Care Instructions    Most babies are born between 37 and 40 weeks.   This is a good time to pack a bag to take with you to the birth. Then it will be ready to go when you are.   7 tips for week 37 of pregnancy   Learn about breastfeeding. For example, find out about ways to hold your baby to make breastfeeding easier. And think about learning how to pump and store milk.    Know that crying is normal. It's common for babies to cry 1 to 3 hours a day. Some cry more, and some cry less.    Learn why babies cry. They may be hungry; have gas; need a diaper change; or feel cold, warm, tired, lonely, or tense. Sometimes they cry for unknown reasons.    Think about what will help you stay calm when your baby cries. Taking slow, deep breaths can help. So can taking a break. It's okay to put your baby somewhere safe (like their crib) and walk away for a few minutes.    Learn about safe sleep for your baby. Always put your baby to sleep on their back. Place them alone in a crib or bassinet with a firm, flat surface. Keep soft items like stuffed animals out of the crib.    Learn what to expect with  poop. Your baby will have their own bowel patterns. Some babies have several bowel movements a day. Some have fewer.    Know that  babies will often have loose, yellow bowel movements. Formula-fed babies have more formed stools. If your baby's poop looks like pellets, your baby is constipated.   Follow-up care is a key part of your treatment and safety. Be sure to make and go to all appointments, and call your doctor if you are having problems. It's also a good idea to know your test results and keep a list of the medicines you take.  When should you call for help?  Call 911 anytime you think you may need emergency care. For example, call if:  You have severe vaginal bleeding. You have soaked through one or more pads in an hour, and the bleeding is not slowing down.  You have sudden, severe  pain in your belly that does not go away.  You have chest pain, are short of breath, or cough up blood.  You passed out (lost consciousness).  You have a seizure.  You see or feel the umbilical cord.  You think you are about to deliver your baby and can't make it safely to the hospital or birthing center.  Call your doctor now or seek immediate medical care if:  You have vaginal bleeding.  You have belly pain.  You have a fever.  You are dizzy or lightheaded, or you feel like you may faint.  You have signs of a blood clot in your leg (called a deep vein thrombosis), such as:  Pain in the calf, back of the knee, thigh, or groin.  Swelling in your leg or groin.  A color change on the leg or groin. The skin may be reddish or purplish.  You have symptoms of preeclampsia, such as:  Sudden swelling of your face, hands, or feet.  New vision problems (such as dimness, blurring, or seeing spots).  A severe headache that will not go away.  You have a sudden release or slow trickle of fluid from your vagina. This may mean your water has broken.  You notice that your baby has stopped moving or is moving less than normal.  You have signs of heart failure, such as:  New or increased shortness of breath.  New or worse swelling in your legs, ankles, or feet.  Sudden weight gain, such as more than 2 to 3 pounds in a day or 5 pounds in a week.  Feeling so tired or weak that you cannot do your usual activities.  You have symptoms of a urinary tract infection. These may include:  Pain or burning when you urinate.  A frequent need to urinate without being able to pass much urine.  Pain in your low back (below the rib cage and above the waist).  Blood in your urine.  You have signs of active labor. During active labor, contractions:  Happen more often and at regular intervals (about every 3 to 5 minutes).  Last longer (about 60 seconds or more).  Get stronger and are hard to talk through.  Watch closely for changes in your health, and be  "sure to contact your doctor if:  You have vaginal discharge that smells bad.  You feel sad, anxious, or hopeless for more than a few days.  You have skin changes, such as a rash, itching, or a yellow color to your skin.  You have other concerns about your pregnancy.  Where can you learn more?  Go to https://www.[employer].net/patiented  Enter N257 in the search box to learn more about \"Week 37 of Your Pregnancy: Care Instructions.\"  Current as of: April 30, 2024  Content Version: 14.5    6046-5493 [employer].   Care instructions adapted under license by your healthcare professional. If you have questions about a medical condition or this instruction, always ask your healthcare professional. [employer] disclaims any warranty or liability for your use of this information.    "